# Patient Record
Sex: FEMALE | Race: BLACK OR AFRICAN AMERICAN | NOT HISPANIC OR LATINO | Employment: UNEMPLOYED | URBAN - METROPOLITAN AREA
[De-identification: names, ages, dates, MRNs, and addresses within clinical notes are randomized per-mention and may not be internally consistent; named-entity substitution may affect disease eponyms.]

---

## 2018-01-13 NOTE — RESULT NOTES
Message  attempted to call pt again  -number incorrect" STD letter mailed to pt's home & scanned into chart   health bureau was also notified of + chlamydia results      Signatures   Electronically signed by : Adolph Ovalle RN; Jun 30 2016  3:02PM EST                       (Author)

## 2018-09-09 ENCOUNTER — APPOINTMENT (EMERGENCY)
Dept: RADIOLOGY | Facility: HOSPITAL | Age: 28
End: 2018-09-09
Payer: COMMERCIAL

## 2018-09-09 ENCOUNTER — HOSPITAL ENCOUNTER (EMERGENCY)
Facility: HOSPITAL | Age: 28
Discharge: HOME | End: 2018-09-09
Attending: EMERGENCY MEDICINE | Admitting: EMERGENCY MEDICINE
Payer: COMMERCIAL

## 2018-09-09 VITALS
SYSTOLIC BLOOD PRESSURE: 115 MMHG | DIASTOLIC BLOOD PRESSURE: 68 MMHG | BODY MASS INDEX: 31.53 KG/M2 | HEART RATE: 100 BPM | WEIGHT: 178 LBS | RESPIRATION RATE: 16 BRPM | OXYGEN SATURATION: 96 % | TEMPERATURE: 97.9 F

## 2018-09-09 DIAGNOSIS — S93.409A ANKLE SPRAIN: Primary | ICD-10-CM

## 2018-09-09 PROCEDURE — 73630 X-RAY EXAM OF FOOT: CPT

## 2018-09-09 PROCEDURE — 73610 X-RAY EXAM OF ANKLE: CPT

## 2018-09-09 PROCEDURE — 99283 EMERGENCY DEPT VISIT LOW MDM: CPT

## 2018-09-09 RX ORDER — NAPROXEN 500 MG/1
500 TABLET ORAL 2 TIMES DAILY WITH MEALS
Qty: 30 TABLET | Refills: 0 | Status: SHIPPED | OUTPATIENT
Start: 2018-09-09

## 2018-09-09 NOTE — DISCHARGE INSTRUCTIONS
Naproxen 500 mg every 12 hours as needed for mild-to-moderate pain  Acetaminophen 650mg by mouth every 8 hours as needed for mild to moderate pain or fever  You can take naproxen and Acetaminophen together safely  Apply a compressive ACE bandage  Rest and elevate the affected painful area  Apply cold compresses intermittently as needed  As pain recedes, begin normal activities slowly as tolerated  Follow up with primary care doctor in 3-5 days for further evaluation  You can also follow up with an orthopedist for further evaluation  Return to the ED for fevers, chills, redness, warmth, numbness, tingling, weakness or any other concerns  Ankle Sprain   WHAT YOU NEED TO KNOW:   An ankle sprain happens when 1 or more ligaments in your ankle joint stretch or tear  Ligaments are tough tissues that connect bones  Ligaments support your joints and keep your bones in place  DISCHARGE INSTRUCTIONS:   Return to the emergency department if:   · You have severe pain in your ankle  · Your foot or toes are cold or numb  · Your ankle becomes more weak or unstable (wobbly)  · You are unable to put any weight on your ankle or foot  · Your swelling has increased or returned  Contact your healthcare provider if:   · Your pain does not go away, even after treatment  · You have questions or concerns about your condition or care  Medicines: You may need any of the following:  · NSAIDs , such as ibuprofen, help decrease swelling, pain, and fever  This medicine is available with or without a doctor's order  NSAIDs can cause stomach bleeding or kidney problems in certain people  If you take blood thinner medicine, always ask your healthcare provider if NSAIDs are safe for you  Always read the medicine label and follow directions  · Acetaminophen  decreases pain  It is available without a doctor's order  Ask how much to take and how often to take it  Follow directions   Acetaminophen can cause liver damage if not taken correctly  · Prescription pain medicine  may be given  Ask how to take this medicine safely  · Take your medicine as directed  Contact your healthcare provider if you think your medicine is not helping or if you have side effects  Tell him or her if you are allergic to any medicine  Keep a list of the medicines, vitamins, and herbs you take  Include the amounts, and when and why you take them  Bring the list or the pill bottles to follow-up visits  Carry your medicine list with you in case of an emergency  Self care:   · Use support devices,  such as a brace, cast, or splint, may be needed to limit your movement and protect your joint  You may need to use crutches to decrease your pain as you move around  · Go to physical therapy as directed  A physical therapist teaches you exercises to help improve movement and strength, and to decrease pain  · Rest  your ankle so that it can heal  Return to normal activities as directed  · Apply ice on your ankle for 15 to 20 minutes every hour or as directed  Use an ice pack, or put crushed ice in a plastic bag  Cover it with a towel  Ice helps prevent tissue damage and decreases swelling and pain  · Compress  your ankle  Ask if you should wrap an elastic bandage around your injured ligament  An elastic bandage provides support and helps decrease swelling and movement so your joint can heal  Wear as long as directed  · Elevate  your ankle above the level of your heart as often as you can  This will help decrease swelling and pain  Prop your ankle on pillows or blankets to keep it elevated comfortably  Prevent another ankle sprain:   · Let your ankle heal   Find out how long your ligament needs to heal  Do not do any physical activity until your healthcare provider says it is okay  If you start activity too soon, you may develop a more serious injury  · Always warm up and stretch  before you exercise or play sports      · Use the right equipment  Always wear shoes that fit well and are made for the activity that you are doing  You may also need ankle supports, elbow and knee pads, or braces  Follow up with your healthcare provider as directed:  Write down your questions so you remember to ask them during your visits  © 2017 [employer] Information is for End User's use only and may not be sold, redistributed or otherwise used for commercial purposes  All illustrations and images included in CareNotes® are the copyrighted property of [employer]  or [employer]elani [employer]  The above information is an  only  It is not intended as medical advice for individual conditions or treatments  Talk to your doctor, nurse or pharmacist before following any medical regimen to see if it is safe and effective for you

## 2020-01-17 ENCOUNTER — HOSPITAL ENCOUNTER (EMERGENCY)
Facility: HOSPITAL | Age: 30
Discharge: HOME | End: 2020-01-17
Attending: EMERGENCY MEDICINE
Payer: COMMERCIAL

## 2020-01-17 VITALS
TEMPERATURE: 99.2 F | SYSTOLIC BLOOD PRESSURE: 133 MMHG | RESPIRATION RATE: 16 BRPM | OXYGEN SATURATION: 100 % | WEIGHT: 176.37 LBS | BODY MASS INDEX: 31.24 KG/M2 | DIASTOLIC BLOOD PRESSURE: 78 MMHG | HEART RATE: 100 BPM

## 2020-01-17 DIAGNOSIS — R05.9 COUGH: ICD-10-CM

## 2020-01-17 DIAGNOSIS — N39.0 UTI (URINARY TRACT INFECTION): Primary | ICD-10-CM

## 2020-01-17 LAB
BACTERIA UR QL AUTO: ABNORMAL /HPF
BILIRUB UR QL STRIP: NEGATIVE
CLARITY UR: ABNORMAL
COLOR UR: ABNORMAL
EXT PREG TEST URINE: NEGATIVE
EXT. CONTROL ED NAV: NORMAL
GLUCOSE UR STRIP-MCNC: NEGATIVE MG/DL
HGB UR QL STRIP.AUTO: 250
KETONES UR STRIP-MCNC: ABNORMAL MG/DL
LEUKOCYTE ESTERASE UR QL STRIP: 500
NITRITE UR QL STRIP: POSITIVE
NON-SQ EPI CELLS URNS QL MICRO: ABNORMAL /HPF
OTHER STN SPEC: ABNORMAL
PH UR STRIP.AUTO: 6.5 [PH]
PROT UR STRIP-MCNC: ABNORMAL MG/DL
RBC #/AREA URNS AUTO: ABNORMAL /HPF
SP GR UR STRIP.AUTO: 1.01 (ref 1–1.04)
UROBILINOGEN UA: 1 MG/DL
WBC #/AREA URNS AUTO: ABNORMAL /HPF

## 2020-01-17 PROCEDURE — 81001 URINALYSIS AUTO W/SCOPE: CPT | Performed by: EMERGENCY MEDICINE

## 2020-01-17 PROCEDURE — 99284 EMERGENCY DEPT VISIT MOD MDM: CPT | Performed by: EMERGENCY MEDICINE

## 2020-01-17 PROCEDURE — 99284 EMERGENCY DEPT VISIT MOD MDM: CPT

## 2020-01-17 PROCEDURE — 81025 URINE PREGNANCY TEST: CPT | Performed by: EMERGENCY MEDICINE

## 2020-01-17 RX ORDER — NITROFURANTOIN 25; 75 MG/1; MG/1
100 CAPSULE ORAL 2 TIMES DAILY
Qty: 14 CAPSULE | Refills: 0 | Status: SHIPPED | OUTPATIENT
Start: 2020-01-17 | End: 2020-01-24

## 2020-01-17 RX ORDER — BENZONATATE 100 MG/1
100 CAPSULE ORAL EVERY 8 HOURS
Qty: 21 CAPSULE | Refills: 0 | Status: SHIPPED | OUTPATIENT
Start: 2020-01-17

## 2020-01-17 NOTE — ED PROVIDER NOTES
History  Chief Complaint   Patient presents with    Flank Pain     " for four days, I think I have another kidney infection, pain to left side, chills,  fever, change in color of urine, frequency"     58-year-old female presents with complaint of left flank pain  She reports that she has a history of pyelonephritis and had similar symptoms at that point time  She does complain of subjective fevers and chills  Symptoms have all been progressive over the past four days  She denies any dysuria or urinary frequency  She took one dose of Aleve earlier this morning with partial relief of symptoms  She denies vomiting but has had decreased appetite and mild nausea  Flank Pain   Pain location:  L flank  Pain quality: aching and dull    Pain radiates to:  Does not radiate  Pain severity:  Mild  Onset quality:  Gradual  Timing:  Constant  Progression:  Worsening  Chronicity:  Recurrent  Relieved by:  Nothing  Worsened by:  Nothing  Ineffective treatments:  OTC medications (Partial relief only)  Associated symptoms: anorexia, chills, fever and nausea    Associated symptoms: no chest pain, no constipation, no cough, no diarrhea, no dysuria, no fatigue, no flatus, no hematuria, no melena, no shortness of breath, no sore throat, no vaginal bleeding, no vaginal discharge and no vomiting        Prior to Admission Medications   Prescriptions Last Dose Informant Patient Reported? Taking? albuterol (PROVENTIL HFA,VENTOLIN HFA) 90 mcg/act inhaler   Yes No   Sig: Inhale 1 puff    naproxen (NAPROSYN) 500 mg tablet   No No   Sig: Take 1 tablet (500 mg total) by mouth 2 (two) times a day with meals      Facility-Administered Medications: None       Past Medical History:   Diagnosis Date    Asthma     UTI in pregnancy        History reviewed  No pertinent surgical history      Family History   Problem Relation Age of Onset    Hypertension Mother     Hypertension Father     Hypertension Maternal Grandmother      I have reviewed and agree with the history as documented  Social History     Tobacco Use    Smoking status: Current Some Day Smoker     Packs/day: 0 20     Types: Cigarettes    Smokeless tobacco: Never Used   Substance Use Topics    Alcohol use: Yes     Alcohol/week: 1 0 standard drinks     Types: 1 Glasses of wine per week    Drug use: No        Review of Systems   Constitutional: Positive for chills and fever  Negative for appetite change and fatigue  HENT: Negative for postnasal drip, sinus pain, sore throat and trouble swallowing  Eyes: Negative for redness and itching  Respiratory: Negative for cough, chest tightness, shortness of breath and wheezing  Cardiovascular: Negative for chest pain and leg swelling  Gastrointestinal: Positive for anorexia and nausea  Negative for abdominal pain, constipation, diarrhea, flatus, melena and vomiting  Endocrine: Negative  Genitourinary: Positive for flank pain  Negative for difficulty urinating, dysuria, hematuria, vaginal bleeding and vaginal discharge  Musculoskeletal: Negative for back pain and myalgias  Skin: Negative for rash  Allergic/Immunologic: Negative  Neurological: Negative for dizziness, numbness and headaches  Hematological: Negative  Psychiatric/Behavioral: Negative  Physical Exam  Physical Exam   Constitutional: She is oriented to person, place, and time  She appears well-developed and well-nourished  HENT:   Head: Normocephalic and atraumatic  Nose: Nose normal    Mouth/Throat: Oropharynx is clear and moist    Eyes: Pupils are equal, round, and reactive to light  Conjunctivae and EOM are normal    Neck: Normal range of motion  Neck supple  Cardiovascular: Normal rate, regular rhythm and normal heart sounds  Pulmonary/Chest: Effort normal and breath sounds normal  No respiratory distress  She has no wheezes  Abdominal: Soft  Bowel sounds are normal  There is tenderness (left sided/suprapubic)   There is no rebound, no guarding and no CVA tenderness  Musculoskeletal: She exhibits no edema, tenderness or deformity  Neurological: She is alert and oriented to person, place, and time  Skin: Skin is warm and dry  Capillary refill takes less than 2 seconds  No rash noted  Psychiatric: She has a normal mood and affect  Her behavior is normal    Nursing note and vitals reviewed        Vital Signs  ED Triage Vitals [01/17/20 1144]   Temperature Pulse Respirations Blood Pressure SpO2   99 2 °F (37 3 °C) 100 16 133/78 100 %      Temp Source Heart Rate Source Patient Position - Orthostatic VS BP Location FiO2 (%)   Tympanic Monitor Sitting Left arm --      Pain Score       --           Vitals:    01/17/20 1144   BP: 133/78   Pulse: 100   Patient Position - Orthostatic VS: Sitting         Visual Acuity      ED Medications  Medications - No data to display    Diagnostic Studies  Results Reviewed     Procedure Component Value Units Date/Time    Urine Microscopic [[id number]]  (Abnormal) Collected:  01/17/20 1204    Lab Status:  Final result Specimen:  Urine, Other Updated:  01/17/20 1248     RBC, UA Innumerable /hpf      WBC, UA Innumerable /hpf      Epithelial Cells Moderate /hpf      Bacteria, UA Occasional /hpf      OTHER OBSERVATIONS Trichomonas Organisms Present    UA (URINE) with reflex to Scope [[id number]]  (Abnormal) Collected:  01/17/20 1204    Lab Status:  Final result Specimen:  Urine, Other Updated:  01/17/20 1214     Color, UA Rosangela     Clarity, UA Slightly Cloudy     Specific Gravity, UA 1 015     pH, UA 6 5     Leukocytes,  0     Nitrite, UA Positive     Protein,  (2+) mg/dl      Glucose, UA Negative mg/dl      Ketones, UA 5 (Trace) mg/dl      Bilirubin, UA Negative     Blood,  0     UROBILINOGEN UA 1 0 mg/dL     POCT pregnancy, urine [[id number]]  (Normal) Resulted:  01/17/20 1206    Lab Status:  Final result Updated:  01/17/20 1206     EXT PREG TEST UR (Ref: Negative) negative     Control valid No orders to display              Procedures  Procedures         ED Course                               MDM      Disposition  Final diagnoses:   UTI (urinary tract infection)   Cough     Time reflects when diagnosis was documented in both MDM as applicable and the Disposition within this note     Time User Action Codes Description Comment    1/17/2020 12:47 PM Raynold Favorite Add [N39 0] UTI (urinary tract infection)     1/17/2020 12:48 PM Raynold Favorite Add [R05] Cough       ED Disposition     ED Disposition Condition Date/Time Comment    Discharge Stable Fri Jan 17, 2020 12:47 PM Vona Leyden discharge to home/self care  Follow-up Information     Follow up With Specialties Details Why 9 [facility] Emergency Medicine  If symptoms worsen [address] [address]  [address] [facility], [place] Internal Medicine Call   [facility] 14  [address]  [phone]            Discharge Medication List as of 1/17/2020 12:48 PM      START taking these medications    Details   benzonatate (TESSALON PERLES) 100 mg capsule Take 1 capsule (100 mg total) by mouth every 8 (eight) hours, Starting Fri 1/17/2020, Print      nitrofurantoin (MACROBID) 100 mg capsule Take 1 capsule (100 mg total) by mouth 2 (two) times a day for 7 days, Starting Fri 1/17/2020, Until Fri 1/24/2020, Print         CONTINUE these medications which have NOT CHANGED    Details   albuterol (PROVENTIL HFA,VENTOLIN HFA) 90 mcg/act inhaler Inhale 1 puff , Until Discontinued, Historical Med      naproxen (NAPROSYN) 500 mg tablet Take 1 tablet (500 mg total) by mouth 2 (two) times a day with meals, Starting Sun 9/9/2018, Print           No discharge procedures on file      ED Provider  Electronically Signed by           Stacy Juarez DO  01/17/20 3423

## 2021-08-18 ENCOUNTER — HOSPITAL ENCOUNTER (EMERGENCY)
Facility: HOSPITAL | Age: 31
Discharge: HOME | End: 2021-08-18
Attending: EMERGENCY MEDICINE | Admitting: EMERGENCY MEDICINE
Payer: COMMERCIAL

## 2021-08-18 VITALS
SYSTOLIC BLOOD PRESSURE: 137 MMHG | DIASTOLIC BLOOD PRESSURE: 75 MMHG | BODY MASS INDEX: 33.2 KG/M2 | RESPIRATION RATE: 18 BRPM | WEIGHT: 187.39 LBS | OXYGEN SATURATION: 96 % | HEIGHT: 63 IN | TEMPERATURE: 101.3 F | HEART RATE: 96 BPM

## 2021-08-18 DIAGNOSIS — R68.89 FLU-LIKE SYMPTOMS: Primary | ICD-10-CM

## 2021-08-18 LAB — SARS-COV-2 RNA RESP QL NAA+PROBE: POSITIVE

## 2021-08-18 PROCEDURE — 99284 EMERGENCY DEPT VISIT MOD MDM: CPT | Performed by: EMERGENCY MEDICINE

## 2021-08-18 PROCEDURE — U0003 INFECTIOUS AGENT DETECTION BY NUCLEIC ACID (DNA OR RNA); SEVERE ACUTE RESPIRATORY SYNDROME CORONAVIRUS 2 (SARS-COV-2) (CORONAVIRUS DISEASE [COVID-19]), AMPLIFIED PROBE TECHNIQUE, MAKING USE OF HIGH THROUGHPUT TECHNOLOGIES AS DESCRIBED BY CMS-2020-01-R: HCPCS | Performed by: EMERGENCY MEDICINE

## 2021-08-18 PROCEDURE — U0005 INFEC AGEN DETEC AMPLI PROBE: HCPCS | Performed by: EMERGENCY MEDICINE

## 2021-08-18 PROCEDURE — 99283 EMERGENCY DEPT VISIT LOW MDM: CPT

## 2021-08-18 RX ORDER — ALBUTEROL SULFATE 2.5 MG/3ML
2.5 SOLUTION RESPIRATORY (INHALATION) EVERY 6 HOURS PRN
Qty: 75 ML | Refills: 0 | Status: SHIPPED | OUTPATIENT
Start: 2021-08-18

## 2021-08-18 RX ORDER — ALBUTEROL SULFATE 90 UG/1
2 AEROSOL, METERED RESPIRATORY (INHALATION) ONCE
Status: COMPLETED | OUTPATIENT
Start: 2021-08-18 | End: 2021-08-18

## 2021-08-18 RX ORDER — ACETAMINOPHEN 325 MG/1
975 TABLET ORAL ONCE
Status: COMPLETED | OUTPATIENT
Start: 2021-08-18 | End: 2021-08-18

## 2021-08-18 RX ORDER — ALBUTEROL SULFATE 90 UG/1
2 AEROSOL, METERED RESPIRATORY (INHALATION) EVERY 4 HOURS PRN
Qty: 8 G | Refills: 0 | Status: SHIPPED | OUTPATIENT
Start: 2021-08-18

## 2021-08-18 RX ORDER — PREDNISONE 20 MG/1
40 TABLET ORAL ONCE
Status: DISCONTINUED | OUTPATIENT
Start: 2021-08-18 | End: 2021-08-18

## 2021-08-18 RX ADMIN — ACETAMINOPHEN 975 MG: 325 TABLET, FILM COATED ORAL at 20:28

## 2021-08-18 RX ADMIN — DEXAMETHASONE SODIUM PHOSPHATE 10 MG: 10 INJECTION, SOLUTION INTRAMUSCULAR; INTRAVENOUS at 20:28

## 2021-08-18 RX ADMIN — ALBUTEROL SULFATE 2 PUFF: 90 AEROSOL, METERED RESPIRATORY (INHALATION) at 20:28

## 2021-08-18 NOTE — Clinical Note
Nasim Lim was seen and treated in our emergency department on 8/18/2021  Diagnosis:     [place]  may return to work on return date  She may return on this date: 08/20/2021         If you have any questions or concerns, please don't hesitate to call        Margarita Barber MD    ______________________________           _______________          _______________  Hospital Representative                              Date                                Time

## 2021-08-18 NOTE — Clinical Note
Shani Torres was seen and treated in our emergency department on 8/18/2021  Diagnosis:     [place]  may return to work on return date  She may return on this date: 08/30/2021         If you have any questions or concerns, please don't hesitate to call        Zoya Alatorre MD    ______________________________           _______________          _______________  Hospital Representative                              Date                                Time

## 2021-08-18 NOTE — ED PROVIDER NOTES
History  Chief Complaint   Patient presents with    Flu Symptoms     PT was exposed to covid on saturday and is experiencing covid like symptoms, running fevers, chills, body aches  History provided by:  Patient   used: No    Flu Symptoms  Presenting symptoms: fatigue, fever, myalgias, nausea and sore throat    Presenting symptoms: no cough, no shortness of breath and no vomiting    Severity:  Moderate  Onset quality:  Gradual  Duration:  2 days  Progression:  Worsening  Chronicity:  New  Relieved by:  Nothing  Worsened by:  Nothing  Ineffective treatments:  None tried  Associated symptoms: no chills and no ear pain        Prior to Admission Medications   Prescriptions Last Dose Informant Patient Reported? Taking? albuterol (PROVENTIL HFA,VENTOLIN HFA) 90 mcg/act inhaler   Yes No   Sig: Inhale 1 puff    benzonatate (TESSALON PERLES) 100 mg capsule   No No   Sig: Take 1 capsule (100 mg total) by mouth every 8 (eight) hours   naproxen (NAPROSYN) 500 mg tablet   No No   Sig: Take 1 tablet (500 mg total) by mouth 2 (two) times a day with meals      Facility-Administered Medications: None       Past Medical History:   Diagnosis Date    Asthma     UTI in pregnancy        History reviewed  No pertinent surgical history  Family History   Problem Relation Age of Onset    Hypertension Mother     Hypertension Father     Hypertension Maternal Grandmother      I have reviewed and agree with the history as documented  E-Cigarette/Vaping     E-Cigarette/Vaping Substances     Social History     Tobacco Use    Smoking status: Current Some Day Smoker     Packs/day: 0 20     Types: Cigarettes    Smokeless tobacco: Never Used   Substance Use Topics    Alcohol use: Yes     Alcohol/week: 1 0 standard drinks     Types: 1 Glasses of wine per week    Drug use: No       Review of Systems   Constitutional: Positive for fatigue and fever  Negative for chills  HENT: Positive for sore throat  Negative for ear pain  Eyes: Negative for pain and visual disturbance  Respiratory: Negative for cough and shortness of breath  Cardiovascular: Negative for chest pain and palpitations  Gastrointestinal: Positive for nausea  Negative for abdominal pain and vomiting  Genitourinary: Negative for dysuria and hematuria  Musculoskeletal: Positive for myalgias  Negative for arthralgias and back pain  Skin: Negative for color change and rash  Neurological: Negative for seizures and syncope  All other systems reviewed and are negative  Physical Exam  Physical Exam  Vitals and nursing note reviewed  Constitutional:       General: She is not in acute distress  Appearance: She is well-developed  HENT:      Head: Normocephalic and atraumatic  Right Ear: Tympanic membrane normal       Left Ear: Tympanic membrane normal       Nose: No congestion or rhinorrhea  Mouth/Throat:      Mouth: Mucous membranes are moist       Pharynx: Oropharynx is clear  Posterior oropharyngeal erythema present  No oropharyngeal exudate  Eyes:      Conjunctiva/sclera: Conjunctivae normal    Cardiovascular:      Rate and Rhythm: Normal rate and regular rhythm  Heart sounds: No murmur heard  Pulmonary:      Effort: Pulmonary effort is normal  No respiratory distress  Breath sounds: Normal breath sounds  Abdominal:      Palpations: Abdomen is soft  Tenderness: There is no abdominal tenderness  Musculoskeletal:      Cervical back: Normal range of motion and neck supple  No rigidity or tenderness  Skin:     General: Skin is warm and dry  Capillary Refill: Capillary refill takes less than 2 seconds  Neurological:      General: No focal deficit present  Mental Status: She is alert and oriented to person, place, and time           Vital Signs  ED Triage Vitals   Temperature Pulse Respirations Blood Pressure SpO2   08/18/21 1908 08/18/21 1908 08/18/21 1908 08/18/21 1908 08/18/21 1908   (!) 101 3 °F (38 5 °C) 96 18 137/75 96 %      Temp Source Heart Rate Source Patient Position - Orthostatic VS BP Location FiO2 (%)   08/18/21 1908 08/18/21 1908 08/18/21 1908 08/18/21 1908 --   Oral Monitor Sitting Left arm       Pain Score       08/18/21 2028       5           Vitals:    08/18/21 1908   BP: 137/75   Pulse: 96   Patient Position - Orthostatic VS: Sitting         Visual Acuity      ED Medications  Medications   acetaminophen (TYLENOL) tablet 975 mg (975 mg Oral Given 8/18/21 2028)   dexamethasone oral liquid 10 mg 1 mL (10 mg Oral Given 8/18/21 2028)   albuterol (PROVENTIL HFA,VENTOLIN HFA) inhaler 2 puff (2 puffs Inhalation Given 8/18/21 2028)       Diagnostic Studies  Results Reviewed     Procedure Component Value Units Date/Time    Novel Coronavirus (Covid-19),PCR [facility] - 2 Hour Stat [[id number]]  (Abnormal) Collected: 08/18/21 1954    Lab Status: Final result Specimen: Nares from Nasopharyngeal Swab Updated: 08/18/21 2109     SARS-CoV-2 Positive    Narrative: The specimen collection materials, transport medium, and/or testing methodology utilized in the production of these test results have been proven to be reliable in a limited validation with an abbreviated program under the Emergency Utilization Authorization provided by the FDA  Testing reported as "Presumptive positive" will be confirmed with secondary testing to ensure result accuracy  Clinical caution and judgement should be used with the interpretation of these results with consideration of the clinical impression and other laboratory testing  Testing reported as "Positive" or "Negative" has been proven to be accurate according to standard laboratory validation requirements  All testing is performed with control materials showing appropriate reactivity at standard intervals                     No orders to display              Procedures  Procedures         ED Course                                           MDM  Number of Diagnoses or Management Options  Flu-like symptoms: new and requires workup  Diagnosis management comments:  79-year-old female presents for evaluation of two days of generalized fatigue/malaise associated with intermittent subjective fever, generalized body aches, and sore throat  She reports and exposure to COVID-19 about two days prior to the onset of her symptoms  He has currently been having symptoms for about two days  She does have a history of asthma and reports on mild cough with her symptoms but denies significant shortness of breath or dyspnea with exertion  She has been able to tolerate solids and liquids by mouth but does report decreased appetite and nausea without vomiting  Has had some sore throat and discomfort with swallowing but denies significant neck pain/stiffness  No rashes  Patient was treated symptomatically in the emergency department  Given history of asthma will is single dose of Decadron both for symptomatic treatment of sore throat and cough/wheezing  Albuterol inhaler  I recommend close PCP follow-up  Patient is feeling well with reassuring vital signs and physical exam at time of discharge  She was discharged prior to final COVID-19 resolved  I did follow up on the result and later called the patient and informed her of her positive test   We discussed quarantine/isolation requirements  Also discussed strict return precautions for worsening symptoms         Amount and/or Complexity of Data Reviewed  Clinical lab tests: reviewed and ordered        Disposition  Final diagnoses:   Flu-like symptoms     Time reflects when diagnosis was documented in both MDM as applicable and the Disposition within this note     Time User Action Codes Description Comment    8/18/2021  8:38 PM Kimmy June Add [R68 89] Flu-like symptoms       ED Disposition     ED Disposition Condition Date/Time Comment    Discharge Stable Wed Aug 18, 2021  8:05 PM [facility] discharge to home/self care             Follow-up Information     Follow up With Specialties Details Why Karla Morrow MD Internal Medicine   [address]   [address] [facility],[address], [address] [facility], Nurse Practitioner   21 [phone] [address] [facility] 16  [phone]            Discharge Medication List as of 8/18/2021  8:44 PM      START taking these medications    Details   albuterol (2 5 mg/3 mL) 0 083 % nebulizer solution Take 3 mL (2 5 mg total) by nebulization every 6 (six) hours as needed for wheezing or shortness of breath, Starting Wed 8/18/2021, Print      Nebulizers (Compressor Nebulizer) MISC Use 1 Units every 6 (six) hours as needed (wheezing) Diagnosis asthma  May substitute brand available , Starting Wed 8/18/2021, Until Fri 9/17/2021 at 2359, Print         CONTINUE these medications which have NOT CHANGED    Details   albuterol (PROVENTIL HFA,VENTOLIN HFA) 90 mcg/act inhaler Inhale 1 puff , Until Discontinued, Historical Med      benzonatate (TESSALON PERLES) 100 mg capsule Take 1 capsule (100 mg total) by mouth every 8 (eight) hours, Starting Fri 1/17/2020, Print      naproxen (NAPROSYN) 500 mg tablet Take 1 tablet (500 mg total) by mouth 2 (two) times a day with meals, Starting Sun 9/9/2018, Print           No discharge procedures on file      PDMP Review     None          ED Provider  Electronically Signed by           Kera Nicole MD  09/07/21 1018

## 2023-01-17 ENCOUNTER — OFFICE VISIT (OUTPATIENT)
Dept: OBGYN CLINIC | Facility: CLINIC | Age: 33
End: 2023-01-17

## 2023-01-17 VITALS
WEIGHT: 196.2 LBS | SYSTOLIC BLOOD PRESSURE: 134 MMHG | HEIGHT: 62 IN | BODY MASS INDEX: 36.1 KG/M2 | DIASTOLIC BLOOD PRESSURE: 87 MMHG | HEART RATE: 73 BPM

## 2023-01-17 DIAGNOSIS — Z30.09 ENCOUNTER FOR COUNSELING REGARDING CONTRACEPTION: Primary | ICD-10-CM

## 2023-01-17 NOTE — PROGRESS NOTES
Assessment/Plan:    No problem-specific Assessment & Plan notes found for this encounter  RTO for office for annual, Pap,  check IUD  After that will return to office for IUD removal  Plans OCPs after IUD removal     Diagnoses and all orders for this visit:    Encounter for counseling regarding contraception          Subjective:      Patient ID: Karl Jamil is a 34 y o  female  HPI 34year-old G3, P3 new patient here for consultation for IUD removal   Mirena IUD inserted 6/16/2016  She reports has not seen gynecologist since her last delivery about 6 years ago  Reviewed with patient that IUD is effective for 8 years now but patient desires removal   She reports she has irregular bleeding that she does not like at this time and would like removal   Also reports she gets cramps without any bleeding  She would like to use OCPs once it is removed  Would like to have a regular menses at this time  Patient is unsure when her last Pap was , no results in chart  Recommend for her to return to office for her annual and Pap  Does use tobacco but is trying to quit, reviewed cannot use combination OCPs at 34 if smoking  The following portions of the patient's history were reviewed and updated as appropriate: allergies, current medications, past family history, past medical history, past social history, past surgical history and problem list     Review of Systems   Constitutional: Negative for chills and fever  Respiratory: Negative  Cardiovascular: Negative  Genitourinary: Positive for menstrual problem  Negative for pelvic pain  Objective:      /87 (BP Location: Left arm, Patient Position: Sitting, Cuff Size: Adult)   Pulse 73   Ht 5' 2" (1 575 m)   Wt 89 kg (196 lb 3 2 oz)   BMI 35 89 kg/m²          Physical Exam  Constitutional:       Appearance: Normal appearance  Cardiovascular:      Rate and Rhythm: Normal rate     Pulmonary:      Effort: Pulmonary effort is normal  Neurological:      Mental Status: She is oriented to person, place, and time     Psychiatric:         Mood and Affect: Mood normal          Behavior: Behavior normal

## 2023-01-24 ENCOUNTER — ANNUAL EXAM (OUTPATIENT)
Dept: OBGYN CLINIC | Facility: CLINIC | Age: 33
End: 2023-01-24

## 2023-01-24 VITALS
WEIGHT: 198.4 LBS | RESPIRATION RATE: 16 BRPM | HEART RATE: 70 BPM | SYSTOLIC BLOOD PRESSURE: 127 MMHG | BODY MASS INDEX: 36.29 KG/M2 | DIASTOLIC BLOOD PRESSURE: 76 MMHG

## 2023-01-24 DIAGNOSIS — Z97.5 IUD (INTRAUTERINE DEVICE) IN PLACE: ICD-10-CM

## 2023-01-24 DIAGNOSIS — Z72.51 HIGH RISK HETEROSEXUAL BEHAVIOR: ICD-10-CM

## 2023-01-24 DIAGNOSIS — Z01.419 ENCOUNTER FOR WELL WOMAN EXAM WITH ROUTINE GYNECOLOGICAL EXAM: Primary | ICD-10-CM

## 2023-01-24 NOTE — PROGRESS NOTES
ASSESSMENT & PLAN: Jil Lancaster is a 34 y o  [id number] with normal gynecologic exam     1   Routine well woman exam done today  2  Pap and HPV:  The patient's last pap and hpv was unsure- maybe 7 years ago    It was normal     Pap and cotesting was done today  Current ASCCP Guidelines reviewed  3   The following were reviewed in today's visit: breast self exam, STD testing, family planning choices, adequate intake of calcium and vitamin D, exercise, healthy diet and tobacco cessation  4  mirena IUD placed 6/16/2016- effective until 6/2024- RTO for removal pt desires, would like to use OCP's   5  Had Gardisil vaccines- she thinks she had - will check    BMI Counseling: Body mass index is 36 29 kg/m²  The BMI is above normal  Nutrition recommendations include 3-5 servings of fruits/vegetables daily, moderation in carbohydrate intake and reducing intake of cholesterol  Exercise recommendations include exercising 3-5 times per week  Depression Screening Follow-up Plan: Patient's depression screening was  Negative with a PHQ-2 score of 0   Their PHQ-9 score was 4   Clinically patient does not have depression  No treatment is required  Tobacco Cessation Counseling: Tobacco cessation counseling was not provided  The patient is sincerely urged to quit consumption of tobacco  She is ready to quit tobacco  The numerous health risks of tobacco consumption were discussed  CC:  Annual Gynecologic Examination    HPI: Jil Lancaster is a 34 y o  [place] who presents for annual gynecologic examination  She has a Mirena IUD that was placed 6/16/2016, she gets irregular bleeding with use  Desires removal due to cramping and irregular bleeding  Desires to use OCP's  She is also trying to quit smoking  She has a new partner x 1 year, consents to testing for GC and cT  Health Maintenance:    She wears her seatbelt routinely  She does perform regular monthly self breast exams  She feels safe at home  Past Medical History:   Diagnosis Date   • 38 weeks gestation of pregnancy 2016   • Asthma    •  (spontaneous vaginal delivery) 2016   • UTI in pregnancy        History reviewed  No pertinent surgical history  Past OB/Gyn History:  OB History        3    Para   3    Term   3            AB        Living   3       SAB        IAB        Ectopic        Multiple   0    Live Births   3               Pt has menstrual issues  Irregular with IUD   History of sexually transmitted infection: No   History of abnormal pap smears: No      Patient is currently sexually active  heterosexual   The current method of family planning is IUD  Family History   Problem Relation Age of Onset   • Hypertension Mother    • No Known Problems Sister    • No Known Problems Sister    • No Known Problems Sister    • Spina bifida Brother    • No Known Problems Brother    • No Known Problems Daughter    • No Known Problems Daughter    • No Known Problems Daughter    • Hypertension Maternal Grandmother    • Deep vein thrombosis Paternal Grandmother    • Breast cancer Neg Hx    • Colon cancer Neg Hx    • Ovarian cancer Neg Hx        Social History:  Social History     Socioeconomic History   • Marital status: /Civil Union     Spouse name: Not on file   • Number of children: Not on file   • Years of education: Not on file   • Highest education level: Not on file   Occupational History   • Not on file   Tobacco Use   • Smoking status: Some Days     Packs/day: 0 20     Types: Cigarettes   • Smokeless tobacco: Never   Vaping Use   • Vaping Use: Never used   Substance and Sexual Activity   • Alcohol use: Yes     Alcohol/week: 1 0 standard drink     Types: 1 Glasses of wine per week   • Drug use: No   • Sexual activity: Yes     Birth control/protection: I U D     Other Topics Concern   • Not on file   Social History Narrative   • Not on file     Social Determinants of Health     Financial Resource Strain: Low Risk • Difficulty of Paying Living Expenses: Not very hard   Food Insecurity: No Food Insecurity   • Worried About Running Out of Food in the Last Year: Never true   • Ran Out of Food in the Last Year: Never true   Transportation Needs: No Transportation Needs   • Lack of Transportation (Medical): No   • Lack of Transportation (Non-Medical): No   Physical Activity: Not on file   Stress: Not on file   Social Connections: Not on file   Intimate Partner Violence: Unknown   • Fear of Current or Ex-Partner: No   • Emotionally Abused: No   • Physically Abused: No   • Sexually Abused: Not on file   Housing Stability: Low Risk    • Unable to Pay for Housing in the Last Year: No   • Number of Places Lived in the Last Year: 1   • Unstable Housing in the Last Year: No     Presently lives with family  Patient is   Patient is currently employed she works from home  No Known Allergies      Current Outpatient Medications:   •  albuterol (2 5 mg/3 mL) 0 083 % nebulizer solution, Take 3 mL (2 5 mg total) by nebulization every 6 (six) hours as needed for wheezing or shortness of breath, Disp: 75 mL, Rfl: 0  •  albuterol (ProAir HFA) 90 mcg/act inhaler, Inhale 2 puffs every 4 (four) hours as needed for wheezing, Disp: 8 g, Rfl: 0  •  albuterol (PROVENTIL HFA,VENTOLIN HFA) 90 mcg/act inhaler, Inhale 1 puff , Disp: , Rfl:   •  benzonatate (TESSALON PERLES) 100 mg capsule, Take 1 capsule (100 mg total) by mouth every 8 (eight) hours (Patient not taking: Reported on 1/24/2023), Disp: 21 capsule, Rfl: 0  •  naproxen (NAPROSYN) 500 mg tablet, Take 1 tablet (500 mg total) by mouth 2 (two) times a day with meals (Patient not taking: Reported on 1/24/2023), Disp: 30 tablet, Rfl: 0  •  Nebulizers (Compressor Nebulizer) MISC, Use 1 Units every 6 (six) hours as needed (wheezing) Diagnosis asthma   May substitute brand available , Disp: 1 each, Rfl: 0      Review of Systems  Constitutional :no fever, feels well, no tiredness, no recent weight gain or loss  ENT: no ear ache, no loss of hearing, no nosebleeds or nasal discharge, no sore throat or hoarseness  Cardiovascular: no complaints of slow or fast heart beat, no chest pain, no palpitations, no leg claudication or lower extremity edema  Respiratory: no complaints of shortness of shortness of breath, no GRAYSON  Breasts:no complaints of breast pain, breast lump, or nipple discharge  Gastrointestinal: no complaints of abdominal pain, constipation, nausea, vomiting, or diarrhea or bloody stools  Genitourinary : no complaints of dysuria, incontinence, pelvic pain, no dysmenorrhea, vaginal discharge or abnormal vaginal bleeding and as noted in HPI  Musculoskeletal: no complaints of arthralgia, no myalgia, no joint swelling or stiffness, no limb pain or swelling  Integumentary: no complaints of skin rash or lesion, itching or dry skin  Neurological: no complaints of headache, no confusion, no numbness or tingling, no dizziness or fainting    Objective      /76 (BP Location: Right arm, Patient Position: Sitting, Cuff Size: Large)   Pulse 70   Resp 16   Wt 90 kg (198 lb 6 4 oz)   BMI 36 29 kg/m²   General:   appears stated age, cooperative, alert normal mood and affect   Neck: normal, supple,trachea midline, no masses   Heart: regular rate and rhythm, S1, S2 normal, no murmur, click, rub or gallop   Lungs: clear to auscultation bilaterally   Breasts: normal appearance, no masses or tenderness, Inspection negative, No nipple retraction or dimpling, No nipple discharge or bleeding, No axillary or supraclavicular adenopathy, Normal to palpation without dominant masses, Taught monthly breast self examination   Abdomen: soft, non-tender, without masses or organomegaly   Vulva: normal female genitalia, Bartholin's, Urethra, [place] normal   Vagina: normal vagina, no discharge, exudate, lesion, or erythema   Urethra: normal   Cervix: Normal, no discharge  PAP done  GCC done  Nontender  IUD strings present  Uterus: normal size, contour, position, consistency, mobility, non-tender and anteverted   Adnexa: normal adnexa and no mass, fullness, tenderness   Lymphatic palpation of lymph nodes in neck, axilla, groin and/or other locations: no lymphadenopathy or masses noted   Skin normal skin turgor and no rashes     Psychiatric orientation to person, place, and time: normal  mood and affect: normal

## 2023-01-25 LAB
C TRACH DNA SPEC QL NAA+PROBE: NEGATIVE
HPV HR 12 DNA CVX QL NAA+PROBE: POSITIVE
HPV16 DNA CVX QL NAA+PROBE: NEGATIVE
HPV18 DNA CVX QL NAA+PROBE: NEGATIVE
N GONORRHOEA DNA SPEC QL NAA+PROBE: NEGATIVE

## 2023-01-31 DIAGNOSIS — B96.89 BACTERIAL VAGINOSIS: Primary | ICD-10-CM

## 2023-01-31 DIAGNOSIS — A59.9 TRICHOMONAS INFECTION: ICD-10-CM

## 2023-01-31 DIAGNOSIS — N76.0 BACTERIAL VAGINOSIS: Primary | ICD-10-CM

## 2023-01-31 LAB
LAB AP GYN PRIMARY INTERPRETATION: ABNORMAL
Lab: ABNORMAL
PATH INTERP SPEC-IMP: ABNORMAL

## 2023-01-31 RX ORDER — METRONIDAZOLE 500 MG/1
500 TABLET ORAL EVERY 12 HOURS SCHEDULED
Qty: 14 TABLET | Refills: 0 | Status: SHIPPED | OUTPATIENT
Start: 2023-01-31 | End: 2023-02-07

## 2023-02-02 ENCOUNTER — PROCEDURE VISIT (OUTPATIENT)
Dept: OBGYN CLINIC | Facility: CLINIC | Age: 33
End: 2023-02-02

## 2023-02-02 VITALS
HEIGHT: 62 IN | SYSTOLIC BLOOD PRESSURE: 125 MMHG | WEIGHT: 197 LBS | DIASTOLIC BLOOD PRESSURE: 85 MMHG | RESPIRATION RATE: 18 BRPM | HEART RATE: 83 BPM | BODY MASS INDEX: 36.25 KG/M2

## 2023-02-02 DIAGNOSIS — N76.0 BACTERIAL VAGINOSIS: ICD-10-CM

## 2023-02-02 DIAGNOSIS — Z30.432 ENCOUNTER FOR IUD REMOVAL: ICD-10-CM

## 2023-02-02 DIAGNOSIS — B96.89 BACTERIAL VAGINOSIS: ICD-10-CM

## 2023-02-02 DIAGNOSIS — Z30.011 ENCOUNTER FOR INITIAL PRESCRIPTION OF CONTRACEPTIVE PILLS: ICD-10-CM

## 2023-02-02 DIAGNOSIS — A59.9 TRICHOMONAS VAGINALIS INFECTION: Primary | ICD-10-CM

## 2023-02-02 LAB
BV WHIFF TEST VAG QL: ABNORMAL
CLUE CELLS SPEC QL WET PREP: ABNORMAL
PH SMN: 5 [PH]
SL AMB POCT WET MOUNT: ABNORMAL
T VAGINALIS VAG QL WET PREP: ABNORMAL
YEAST VAG QL WET PREP: ABNORMAL

## 2023-02-02 RX ORDER — NORGESTIMATE AND ETHINYL ESTRADIOL 0.25-0.035
1 KIT ORAL DAILY
Qty: 28 TABLET | Refills: 12 | Status: SHIPPED | OUTPATIENT
Start: 2023-02-02 | End: 2023-03-02

## 2023-02-02 NOTE — PROGRESS NOTES
Assessment/Plan:    No problem-specific Assessment & Plan notes found for this encounter  Diagnoses and all orders for this visit:    Encounter for initial prescription of contraceptive pills  -     norgestimate-ethinyl estradiol (Sprintec 28) 0 25-35 MG-MCG per tablet; Take 1 tablet by mouth daily for 28 days  No contraindications to OCP's  Reviewed risks, side effects, benefits, efficacy, back up method, ACHES, and  missed pills  Trichomonas vaginalis infection      Pt has prescription, needs to start  Partner to be treated    Subjective:      Patient ID: Kristen Granados is a 34 y o  female  HPI    The following portions of the patient's history were reviewed and updated as appropriate: allergies, current medications, past family history, past medical history, past social history, past surgical history and problem list     Review of Systems      Objective:      /85 (BP Location: Right arm, Patient Position: Sitting, Cuff Size: Adult)   Pulse 83   Resp 18   Ht 5' 2" (1 575 m)   Wt 89 4 kg (197 lb)   LMP 01/30/2023 (Exact Date)   BMI 36 03 kg/m²          Physical Exam  Constitutional:       Appearance: Normal appearance  Cardiovascular:      Rate and Rhythm: Normal rate  Pulmonary:      Effort: Pulmonary effort is normal    Abdominal:      Palpations: Abdomen is soft  Tenderness: There is no abdominal tenderness  Skin:     General: Skin is warm and dry  Neurological:      Mental Status: She is oriented to person, place, and time  Psychiatric:         Mood and Affect: Mood normal          Behavior: Behavior normal        External genitalia-no lesions or erythema  Vagina-white discharge  Cervix-negative CMT no lesions, IUD strings present    Uterus-anteverted, nontender  Adnexa-no masses nontender    Wet mount KOH-pos trich and BV

## 2023-02-02 NOTE — PROGRESS NOTES
Iud removal    Date/Time: 2/2/2023 6:29 PM  Performed by: MICHELLE Meza  Authorized by: MICHELLE Meza   Universal Protocol:  Procedure performed by:  Consent: Verbal consent obtained  Written consent obtained  Risks and benefits: risks, benefits and alternatives were discussed (risk of pain, bleeding, difficult removal)  Consent given by: patient  Patient understanding: patient states understanding of the procedure being performed  Patient consent: the patient's understanding of the procedure matches consent given  Procedure consent: procedure consent matches procedure scheduled  Patient identity confirmed: verbally with patient      Procedure:     Removed with no complications: no      Removal due to mechanical complications of IUD: no      Removal due to infection and inflammatory reaction: no      Other reason for removal:  Pt desires removal  Comments:      Some difficulty with IUD removal, IUD Strings broke with removal before devise removed

## 2023-02-15 ENCOUNTER — HOSPITAL ENCOUNTER (EMERGENCY)
Facility: HOSPITAL | Age: 33
Discharge: HOME | End: 2023-02-15
Attending: EMERGENCY MEDICINE | Admitting: EMERGENCY MEDICINE

## 2023-02-15 ENCOUNTER — APPOINTMENT (EMERGENCY)
Dept: RADIOLOGY | Facility: HOSPITAL | Age: 33
End: 2023-02-15

## 2023-02-15 VITALS
SYSTOLIC BLOOD PRESSURE: 132 MMHG | HEART RATE: 83 BPM | OXYGEN SATURATION: 97 % | TEMPERATURE: 98.7 F | DIASTOLIC BLOOD PRESSURE: 84 MMHG | RESPIRATION RATE: 20 BRPM

## 2023-02-15 DIAGNOSIS — J45.21 MILD INTERMITTENT ASTHMA WITH ACUTE EXACERBATION: Primary | ICD-10-CM

## 2023-02-15 LAB
ATRIAL RATE: 78 BPM
EXT PREGNANCY TEST URINE: NEGATIVE
EXT. CONTROL: NORMAL
P AXIS: 60 DEGREES
PR INTERVAL: 148 MS
QRS AXIS: 65 DEGREES
QRSD INTERVAL: 76 MS
QT INTERVAL: 378 MS
QTC INTERVAL: 430 MS
T WAVE AXIS: 25 DEGREES
VENTRICULAR RATE: 78 BPM

## 2023-02-15 RX ORDER — PREDNISONE 20 MG/1
40 TABLET ORAL DAILY
Qty: 10 TABLET | Refills: 0 | Status: SHIPPED | OUTPATIENT
Start: 2023-02-15 | End: 2023-02-20

## 2023-02-15 RX ORDER — ALBUTEROL SULFATE 2.5 MG/3ML
2.5 SOLUTION RESPIRATORY (INHALATION) EVERY 6 HOURS PRN
Qty: 75 ML | Refills: 0 | Status: SHIPPED | OUTPATIENT
Start: 2023-02-15 | End: 2023-03-17

## 2023-02-15 RX ORDER — ALBUTEROL SULFATE 90 UG/1
2 AEROSOL, METERED RESPIRATORY (INHALATION) EVERY 4 HOURS PRN
Qty: 6.7 G | Refills: 0 | Status: SHIPPED | OUTPATIENT
Start: 2023-02-15

## 2023-02-15 RX ORDER — ALBUTEROL SULFATE 2.5 MG/3ML
5 SOLUTION RESPIRATORY (INHALATION) ONCE
Status: COMPLETED | OUTPATIENT
Start: 2023-02-15 | End: 2023-02-15

## 2023-02-15 RX ORDER — PREDNISONE 20 MG/1
40 TABLET ORAL ONCE
Status: COMPLETED | OUTPATIENT
Start: 2023-02-15 | End: 2023-02-15

## 2023-02-15 RX ADMIN — ALBUTEROL SULFATE 5 MG: 2.5 SOLUTION RESPIRATORY (INHALATION) at 09:32

## 2023-02-15 RX ADMIN — IPRATROPIUM BROMIDE 0.5 MG: 0.5 SOLUTION RESPIRATORY (INHALATION) at 09:32

## 2023-02-15 RX ADMIN — PREDNISONE 40 MG: 20 TABLET ORAL at 09:32

## 2023-02-15 NOTE — DISCHARGE INSTRUCTIONS
Use your inhaler and nebulizer as discussed  Take the prescribed steroid as directed as well  Follow-up with your primary care physician  Please return to the emergency department if you develop worsening symptoms, difficulty breathing, severe pain, or anything else concerning to you

## 2023-02-15 NOTE — ED PROVIDER NOTES
History  Chief Complaint   Patient presents with   • Asthma     Pt reports has asthma is acting up, for the last 2 days  Pt reports she would have done a treatment but is out of meds  23-year-old female with history of asthma who presents for evaluation of shortness of breath  Patient reports that she noted her asthma began to flare approximately 2 days ago  She has had chest tightness, cough, shortness of breath  Her symptoms have been progressively worsening  She has been using her inhaler with minimal relief  She has a nebulizer at home but is out of her albuterol medication and so was unable to trial this  She has not had any rhinorrhea, sore throat, fevers  She complains of occasional chest pain when coughing  She has not had any abdominal pain, nausea, diarrhea  She had 1 episode of posttussive emesis this morning but has not had any other vomiting  No known sick contacts  Prior to Admission Medications   Prescriptions Last Dose Informant Patient Reported? Taking? Nebulizers (Compressor Nebulizer) MISC   No No   Sig: Use 1 Units every 6 (six) hours as needed (wheezing) Diagnosis asthma  May substitute brand available  albuterol (2 5 mg/3 mL) 0 083 % nebulizer solution   No No   Sig: Take 3 mL (2 5 mg total) by nebulization every 6 (six) hours as needed for wheezing or shortness of breath   albuterol (PROVENTIL HFA,VENTOLIN HFA) 90 mcg/act inhaler   Yes No   Sig: Inhale 1 puff     Patient not taking: Reported on 2023   albuterol (ProAir HFA) 90 mcg/act inhaler Not Taking  No No   Sig: Inhale 2 puffs every 4 (four) hours as needed for wheezing   Patient not taking: Reported on 2/15/2023   norgestimate-ethinyl estradiol (Sprintec 28) 0 25-35 MG-MCG per tablet   No No   Sig: Take 1 tablet by mouth daily for 28 days      Facility-Administered Medications: None       Past Medical History:   Diagnosis Date   • 38 weeks gestation of pregnancy 2016   • Asthma    •  (spontaneous vaginal delivery) 2/12/2016   • UTI in pregnancy        History reviewed  No pertinent surgical history  Family History   Problem Relation Age of Onset   • Hypertension Mother    • No Known Problems Sister    • No Known Problems Sister    • No Known Problems Sister    • Spina bifida Brother    • No Known Problems Brother    • No Known Problems Daughter    • No Known Problems Daughter    • No Known Problems Daughter    • Hypertension Maternal Grandmother    • Deep vein thrombosis Paternal Grandmother    • Breast cancer Neg Hx    • Colon cancer Neg Hx    • Ovarian cancer Neg Hx      I have reviewed and agree with the history as documented  E-Cigarette/Vaping   • E-Cigarette Use Current Every Day User      E-Cigarette/Vaping Substances   • Nicotine Yes    • THC No    • CBD No    • Flavoring Yes    • Other No    • Unknown No      Social History     Tobacco Use   • Smoking status: Some Days     Packs/day: 0 20     Types: Cigarettes   • Smokeless tobacco: Never   Vaping Use   • Vaping Use: Every day   • Substances: Nicotine, Flavoring   Substance Use Topics   • Alcohol use: Yes     Alcohol/week: 1 0 standard drink     Types: 1 Glasses of wine per week   • Drug use: No       Review of Systems   Constitutional: Negative for chills and fever  HENT: Negative for congestion and sore throat  Eyes: Negative for visual disturbance  Respiratory: Positive for cough, chest tightness, shortness of breath and wheezing  Cardiovascular: Positive for chest pain  Negative for leg swelling  Gastrointestinal: Positive for vomiting  Negative for abdominal pain, diarrhea and nausea  Genitourinary: Negative for flank pain  Musculoskeletal: Negative for gait problem  Skin: Negative for rash  Neurological: Negative for weakness and headaches  All other systems reviewed and are negative  Physical Exam  Physical Exam  Vitals and nursing note reviewed  Constitutional:       General: She is not in acute distress  Appearance: She is well-developed  She is not ill-appearing  HENT:      Head: Normocephalic and atraumatic  Nose: Nose normal       Mouth/Throat:      Mouth: Mucous membranes are moist    Eyes:      Extraocular Movements: Extraocular movements intact  Cardiovascular:      Rate and Rhythm: Normal rate and regular rhythm  Heart sounds: No murmur heard  No friction rub  No gallop  Pulmonary:      Effort: Pulmonary effort is normal       Breath sounds: Wheezing (scattered expiratory wheezes) present  No rhonchi or rales  Abdominal:      General: There is no distension  Palpations: Abdomen is soft  Tenderness: There is no abdominal tenderness  Musculoskeletal:         General: No swelling or tenderness  Normal range of motion  Cervical back: Normal range of motion and neck supple  Skin:     General: Skin is warm and dry  Coloration: Skin is not pale  Findings: No rash  Neurological:      General: No focal deficit present  Mental Status: She is alert and oriented to person, place, and time     Psychiatric:         Behavior: Behavior normal          Vital Signs  ED Triage Vitals [02/15/23 0914]   Temperature Pulse Respirations Blood Pressure SpO2   98 7 °F (37 1 °C) 83 20 132/84 97 %      Temp Source Heart Rate Source Patient Position - Orthostatic VS BP Location FiO2 (%)   Oral Monitor Lying Left arm --      Pain Score       6           Vitals:    02/15/23 0914   BP: 132/84   Pulse: 83   Patient Position - Orthostatic VS: Lying         Visual Acuity      ED Medications  Medications   albuterol inhalation solution 5 mg (5 mg Nebulization Given 2/15/23 0932)   ipratropium (ATROVENT) 0 02 % inhalation solution 0 5 mg (0 5 mg Nebulization Given 2/15/23 0932)   predniSONE tablet 40 mg (40 mg Oral Given 2/15/23 0932)       Diagnostic Studies  Results Reviewed     Procedure Component Value Units Date/Time    POCT pregnancy, urine [[id number]]  (Normal) Resulted: 02/15/23 0930    Lab Status: Final result Updated: 02/15/23 0931     EXT Preg Test, Ur Negative     Control Valid                 XR chest 1 view portable   Final Result by Crow Moran MD (02/15 1353)      No acute cardiopulmonary disease  Workstation performed: [id number]                    Procedures  Procedures         ED Course  ED Course as of 02/15/23 1744   Wed Feb 15, 2023   4520 Procedure Note: EKG  Date/Time: 02/15/23 9:15 AM   Interpreted by: Shelton Lawson  Indications / Diagnosis: shortness of breath  ECG reviewed by me, the ED Provider: yes   The EKG demonstrates:  Rhythm: rate 78, normal sinus  Intervals: normal intervals  Axis: normal axis  QRS/Blocks: normal QRS  ST Changes: No acute ST Changes, no STD/NETO     0936 PREGNANCY TEST URINE: Negative   1029 Patient re-evaluated  Feeling improved after treatment  Feels ready for discharge  PERC Rule for PE    Flowsheet Row Most Recent Value   PERC Rule for PE    Age >=50 0 Filed at: 02/15/2023 0923   HR >=100 0 Filed at: 02/15/2023 0923   O2 Sat on room air < 95% 0 Filed at: 02/15/2023 3412   History of PE or DVT 0 Filed at: 02/15/2023 4416   Recent trauma or surgery 0 Filed at: 02/15/2023 7417   Hemoptysis 0 Filed at: 02/15/2023 5308   Exogenous estrogen 0 Filed at: 02/15/2023 5830   Unilateral leg swelling 0 Filed at: 02/15/2023 1748   PERC Rule for PE Results 0 Filed at: 02/15/2023 1491                            Medical Decision Making  74-year-old female presenting for shortness of breath  Differential diagnoses include but not limited to asthma exacerbation, pneumonia, pneumothorax  History and exam not consistent with PE, ACS  Chest x-ray without any acute pathology  EKG unremarkable  Patient feeling significantly improved after symptomatic treatment  Likely asthma exacerbation  Advised follow-up with primary care physician  Return precautions discussed      Mild intermittent asthma with acute exacerbation: acute illness or injury  Amount and/or Complexity of Data Reviewed  Labs: ordered  Decision-making details documented in ED Course  Radiology: ordered  ECG/medicine tests: ordered and independent interpretation performed  Decision-making details documented in ED Course  Risk  Prescription drug management  Disposition  Final diagnoses:   Mild intermittent asthma with acute exacerbation     Time reflects when diagnosis was documented in both MDM as applicable and the Disposition within this note     Time User Action Codes Description Comment    2/15/2023 10:30 AM Anjana Burnett [J45 21] Mild intermittent asthma with acute exacerbation       ED Disposition     ED Disposition   Discharge    Condition   Stable    Date/Time   Wed Feb 15, 2023 10:30 AM    Comment   Jay Counts discharge to home/self care  Follow-up Information     Follow up With Specialties Details Why Karla Morrow MD Internal Medicine In 2 days  [facility]            Discharge Medication List as of 2/15/2023 10:32 AM      START taking these medications    Details   predniSONE 20 mg tablet Take 2 tablets (40 mg total) by mouth daily for 5 days, Starting Wed 2/15/2023, Until Mon 2/20/2023, Normal         CONTINUE these medications which have CHANGED    Details   albuterol (2 5 mg/3 mL) 0 083 % nebulizer solution Take 3 mL (2 5 mg total) by nebulization every 6 (six) hours as needed for wheezing or shortness of breath, Starting Wed 2/15/2023, Until Fri 3/17/2023 at 2359, Normal      albuterol (ProAir HFA) 90 mcg/act inhaler Inhale 2 puffs every 4 (four) hours as needed for wheezing, Starting Wed 2/15/2023, Normal         CONTINUE these medications which have NOT CHANGED    Details   Nebulizers (Compressor Nebulizer) MISC Use 1 Units every 6 (six) hours as needed (wheezing) Diagnosis asthma   May substitute brand available , Starting Wed 8/18/2021, Until Fri 9/17/2021 at 2359, Print      norgestimate-ethinyl estradiol (Sprintec 28) 0 25-35 MG-MCG per tablet Take 1 tablet by mouth daily for 28 days, Starting Thu 2/2/2023, Until Thu 3/2/2023, Normal             No discharge procedures on file      PDMP Review     None          ED Provider  Electronically Signed by           Mesha Cruz MD  02/15/23 4487

## 2023-02-16 ENCOUNTER — PROCEDURE VISIT (OUTPATIENT)
Dept: OBGYN CLINIC | Facility: CLINIC | Age: 33
End: 2023-02-16

## 2023-02-16 VITALS
RESPIRATION RATE: 18 BRPM | HEIGHT: 63 IN | HEART RATE: 91 BPM | BODY MASS INDEX: 34.55 KG/M2 | WEIGHT: 195 LBS | SYSTOLIC BLOOD PRESSURE: 102 MMHG | DIASTOLIC BLOOD PRESSURE: 69 MMHG

## 2023-02-16 DIAGNOSIS — R87.619 ABNORMAL CERVICAL PAPANICOLAOU SMEAR, UNSPECIFIED ABNORMAL PAP FINDING: ICD-10-CM

## 2023-02-16 DIAGNOSIS — Z32.00 ENCOUNTER FOR PREGNANCY TEST, RESULT UNKNOWN: Primary | ICD-10-CM

## 2023-02-16 LAB — SL AMB POCT URINE HCG: NORMAL

## 2023-02-16 NOTE — PROGRESS NOTES
Colposcopy     Date/Time 2/16/2023 11:15 AM     Universal Protocol   Procedure performed by: John Wright)  Consent: Verbal consent obtained  Written consent obtained  Risks and benefits: risks, benefits and alternatives were discussed  Consent given by: patient  Time out: Immediately prior to procedure a "time out" was called to verify the correct patient, procedure, equipment, support staff and site/side marked as required    Patient understanding: patient states understanding of the procedure being performed  Patient consent: the patient's understanding of the procedure matches consent given  Relevant documents: relevant documents present and verified  Required items: required blood products, implants, devices, and special equipment available  Patient identity confirmed: verbally with patient       Performed by  Jodell Gilford, MD     Authorized by Jodell Gilford, MD        Pre-procedure details     Pre-procedure timeout performed: yes      Prepped with: acetic acid       Indication    ASC-US     Procedure Details   Procedure: Colposcopy w/ biopsy of cervix      Under satisfactory analgesia the patient was prepped and draped in the dorsal lithotomy position: yes      [place] speculum was placed in the vagina: yes      Under colposcopic examination the transition zone was seen in entirety: yes      Intracervical block was performed: no      Cervical biopsy performed with a cervical biopsy punch: yes      Tampon inserted: no      Monsel's solution was applied: yes      Biopsy(s): yes      Location:  7:00, 12:00    Specimen to pathology: yes       Post-procedure      Findings: Bleeding      Impression: Low grade cervical dysplasia

## 2023-03-02 ENCOUNTER — OFFICE VISIT (OUTPATIENT)
Dept: OBGYN CLINIC | Facility: CLINIC | Age: 33
End: 2023-03-02

## 2023-03-02 VITALS
SYSTOLIC BLOOD PRESSURE: 110 MMHG | WEIGHT: 197 LBS | HEART RATE: 94 BPM | RESPIRATION RATE: 16 BRPM | DIASTOLIC BLOOD PRESSURE: 74 MMHG | BODY MASS INDEX: 34.9 KG/M2

## 2023-03-02 DIAGNOSIS — N87.1 CIN II (CERVICAL INTRAEPITHELIAL NEOPLASIA II): Primary | ICD-10-CM

## 2023-03-02 NOTE — PROGRESS NOTES
H and P    Subjective    Patient is a 35 yo  who presents today to discuss the results of her recent colposcopy, which demonstrated LSIL/DOMITILA 1 at the 7:00 position and HSIL/DOMITILA 2 at the 12:00 position  Her Pap smear had demonstrated ASCUS and high risk HPV other  We discussed the recommendation for LEEP as a diagnostic and therapeutic procedure  We discussed the procedure in detail, including risks, benefits, and post-operative expectations  We reviewed the risks of bleeding, infection, and injury to surrounding structures, including the vaginal, bladder, and rectum  Importantly, the patient states that she declines a blood transfusion, even in a life-saving scenario  Of note, the patient was recently treated for an asthma exacerbation in the ED  She received albuterol treatment and steroids  She states that her symptoms have improved significantly, and are usually exacerbated by cold weather and allergies  She does not currently have a PCP or other provider who manages her asthma  We discussed the importance of obtaining a PCP to help manage her asthma  OB History        3    Para   3    Term   3            AB        Living   3       SAB        IAB        Ectopic        Multiple   0    Live Births   3                        Objective    Vitals:    23 1024   BP: 110/74   BP Location: Right arm   Patient Position: Sitting   Cuff Size: Standard   Pulse: 94   Resp: 16   Weight: 89 4 kg (197 lb)        Physical Exam  Constitutional:       Appearance: Normal appearance  HENT:      Head: Normocephalic and atraumatic  Cardiovascular:      Rate and Rhythm: Normal rate  Heart sounds: Normal heart sounds  Pulmonary:      Effort: Pulmonary effort is normal  No respiratory distress  Breath sounds: Normal breath sounds  Abdominal:      Palpations: Abdomen is soft  Tenderness: There is no abdominal tenderness  Musculoskeletal:         General: No deformity   Normal range of motion  Neurological:      Mental Status: She is alert  Skin:     General: Skin is warm and dry            Assessment    Patient Active Problem List   Diagnosis   • Encounter for counseling regarding contraception   • High risk heterosexual behavior   • Encounter for well woman exam with routine gynecological exam   • IUD (intrauterine device) in place   • Encounter for initial prescription of contraceptive pills   • Trichomonas vaginalis infection   • Bacterial vaginosis   • Encounter for IUD removal        Plan  - consent signed today for a LEEP procedure, exam under anesthesia  - Patient does NOT consent to a blood transfusion, even in a life-saving scenario; will need to sign blood refusal form at time of surgery  - Referral provided to primary care to address recent asthma exacerbation

## 2023-03-10 ENCOUNTER — TELEPHONE (OUTPATIENT)
Dept: OTHER | Facility: HOSPITAL | Age: 33
End: 2023-03-10

## 2023-03-10 NOTE — TELEPHONE ENCOUNTER
Case request for LEEP procedure approved by surgical review committee  Encourage patient to follow up with PCP for asthma at her H and P visit      Clara Vaca MD  OB/GYN PGY-4  3/10/2023  3:03 PM

## 2023-03-17 ENCOUNTER — PREP FOR PROCEDURE (OUTPATIENT)
Dept: OBGYN CLINIC | Facility: CLINIC | Age: 33
End: 2023-03-17

## 2023-03-17 DIAGNOSIS — Z01.818 PREOP TESTING: ICD-10-CM

## 2023-03-17 DIAGNOSIS — N87.1 MODERATE DYSPLASIA OF CERVIX (CIN II): Primary | ICD-10-CM

## 2023-03-17 NOTE — TELEPHONE ENCOUNTER
S/w pt H&P schedule on 3/27/23 Dick Tucker, Sx on 3/31/23 ashok Curry was informed of pre admission testing prior to sx, pt understood and was in agreement with all instructions given

## 2023-03-25 PROBLEM — Z01.419 ENCOUNTER FOR WELL WOMAN EXAM WITH ROUTINE GYNECOLOGICAL EXAM: Status: RESOLVED | Noted: 2023-01-24 | Resolved: 2023-03-25

## 2023-03-27 ENCOUNTER — OFFICE VISIT (OUTPATIENT)
Dept: OBGYN CLINIC | Facility: CLINIC | Age: 33
End: 2023-03-27

## 2023-03-27 VITALS
SYSTOLIC BLOOD PRESSURE: 128 MMHG | HEIGHT: 63 IN | BODY MASS INDEX: 35.44 KG/M2 | HEART RATE: 75 BPM | WEIGHT: 200 LBS | DIASTOLIC BLOOD PRESSURE: 70 MMHG

## 2023-03-27 DIAGNOSIS — N87.1 CIN II (CERVICAL INTRAEPITHELIAL NEOPLASIA II): ICD-10-CM

## 2023-03-27 DIAGNOSIS — Z32.01 POSITIVE PREGNANCY TEST: Primary | ICD-10-CM

## 2023-03-27 DIAGNOSIS — O21.9 NAUSEA AND VOMITING IN PREGNANCY: ICD-10-CM

## 2023-03-27 DIAGNOSIS — Z3A.08 8 WEEKS GESTATION OF PREGNANCY: ICD-10-CM

## 2023-03-27 LAB — SL AMB POCT URINE HCG: POSITIVE

## 2023-03-27 RX ORDER — DIPHENHYDRAMINE HYDROCHLORIDE 25 MG/1
25 CAPSULE ORAL DAILY
Qty: 30 TABLET | Refills: 3 | Status: SHIPPED | OUTPATIENT
Start: 2023-03-27

## 2023-03-27 RX ORDER — PNV NO.95/FERROUS FUM/FOLIC AC 28MG-0.8MG
1 TABLET ORAL DAILY
Qty: 30 TABLET | Refills: 9 | Status: SHIPPED | OUTPATIENT
Start: 2023-03-27

## 2023-03-27 NOTE — PROGRESS NOTES
[address]   Viability Scan  Name: Ted Akins  MRN: [id number]  : 1990      ASSESSMENT/PLAN:  Problem   8 Weeks Gestation of Pregnancy    Pompa IUP identified  Cardiac activity detected  CRL c/w GA 8w3d  Prenatal labs and vitamins ordered  Ambulatory referral to [facility]  RTC in 4w for prenatal H&P      Nausea and Vomiting in Pregnancy    Vitamin B6 and unisom ordered for management      Loreta II (Cervical Intraepithelial Neoplasia Ii)    Discussed risk of advancement of disease given current decline of surgical management with new pregnancy of 8w gestation  Recommendation per ASCCP guidelines for colposcopy surveillance every 12-24w during pregnancy to monitor for clinically appreciated advancement of condition  Will currently plan to pursue prenatal care with eventual LEEP procedure in the postpartum period           SUBJECTIVE:  Mary Thomas is a pleasant 29yo [id number] female who initially presented today for surgical H&P for LEEP in the setting of confirmed HSIL/CIN2 via colposcopy  Patient was previously counseled regarding risks and benefits of surgical management vs  Expectant management including risk of disease progression and concern for evolution of cervical cancer  We discussed in detail HPV being a significant risk factor for cervical cancer and the current dysplasia already appreciated through colposcopic biopsy  At this time, Kalyani reports new pregnancy  LMP unknown  Ultrasound completed today c/w pompa IUP at 8w3d gestation  This is a highly desired pregnancy although unplanned  Chardonnay reports that she does not desire termination and expressed understanding regarding her current diagnosis of CIN2 and risk of disease progression  We discussed active surveillance through colposcopic examination every 12-24 weeks as recommended by the ASCCP guidelines   We also discussed that in the situation where disease progression is clinically assessed, an excision procedure may be recommended which may results in bleeding, infection, cervical insuffiencey,  labor, or loss of the pregnancy  Patient expressed understanding and is agreeable at this time to surveillance with colposcopy during this pregnancy  We discussed prenatal care with MFM referral  Mary Thomas plans to return for prenatal H&P  All questions answered during visitation  OBJECTIVE:  Vitals:    23 1605   BP: 128/70   Pulse: 75       Physical Exam  Vitals and nursing note reviewed  Exam conducted with a chaperone present  Constitutional:       General: She is not in acute distress  HENT:      Head: Normocephalic  Right Ear: External ear normal       Left Ear: External ear normal    Eyes:      General: No scleral icterus  Right eye: No discharge  Left eye: No discharge  Conjunctiva/sclera: Conjunctivae normal    Cardiovascular:      Rate and Rhythm: Normal rate and regular rhythm  Pulses: Normal pulses  Heart sounds: Normal heart sounds  Pulmonary:      Effort: Pulmonary effort is normal  No respiratory distress  Breath sounds: Normal breath sounds  Abdominal:      General: Abdomen is flat  There is no distension  Palpations: Abdomen is soft  Tenderness: There is no abdominal tenderness  There is no guarding  Genitourinary:     General: Normal vulva  Vagina: No vaginal discharge  Musculoskeletal:         General: No swelling or tenderness  Normal range of motion  Cervical back: Normal range of motion  Right lower leg: No edema  Left lower leg: No edema  Skin:     General: Skin is warm and dry  Capillary Refill: Capillary refill takes less than 2 seconds  Neurological:      Mental Status: She is alert and oriented to person, place, and time  Mental status is at baseline     Psychiatric:         Mood and Affect: Mood normal          Behavior: Behavior normal          FIRST TRIMESTER OBSTETRIC ULTRASOUND  Nava Chery MD INDICATION: Amenorrhea, viability    COMPARISON: None  TECHNIQUE:   Transvaginal imaging was performed to assess the gestation, myometrial/endometrial architecture and ovarian parenchymal detail  The study includes volumetric sweeps and traditional still imaging technique  FINDINGS:     A single intrauterine gestation is identified  Cardiac activity is detected      YOLK SAC:  Present and normal in size and appearance  MEAN CROWN RUMP LENGTH:  1 94 cm = 8 weeks 3 days   AMNIOTIC FLUID/SAC SHAPE:  Within expected normal range  UTERUS/ADNEXA:   No adnexal mass or pathologic cyst   No free fluid identified  Left ovary not visualized  IMPRESSION:    According to , will date based off this ultrasound  Final LOIDA: 11/3/2023  Gestational age: 9w3d  Fetal cardiac activity detected  No adnexal masses seen        Dilma Crespo MD  OB/GYN PGY-2  3/27/2023  4:27 PM

## 2023-03-29 PROBLEM — O21.9 NAUSEA AND VOMITING IN PREGNANCY: Status: ACTIVE | Noted: 2023-03-29

## 2023-03-29 PROBLEM — Z3A.08 8 WEEKS GESTATION OF PREGNANCY: Status: ACTIVE | Noted: 2023-03-29

## 2023-04-25 ENCOUNTER — ROUTINE PRENATAL (OUTPATIENT)
Facility: HOSPITAL | Age: 33
End: 2023-04-25

## 2023-04-25 VITALS
WEIGHT: 194.8 LBS | SYSTOLIC BLOOD PRESSURE: 102 MMHG | BODY MASS INDEX: 34.52 KG/M2 | HEART RATE: 99 BPM | DIASTOLIC BLOOD PRESSURE: 64 MMHG | HEIGHT: 63 IN

## 2023-04-25 DIAGNOSIS — Z3A.08 8 WEEKS GESTATION OF PREGNANCY: ICD-10-CM

## 2023-04-25 DIAGNOSIS — Z36.82 ENCOUNTER FOR (NT) NUCHAL TRANSLUCENCY SCAN: Primary | ICD-10-CM

## 2023-04-25 DIAGNOSIS — Z3A.12 12 WEEKS GESTATION OF PREGNANCY: ICD-10-CM

## 2023-04-25 PROBLEM — Z30.432 ENCOUNTER FOR IUD REMOVAL: Status: RESOLVED | Noted: 2023-02-02 | Resolved: 2023-04-25

## 2023-04-25 PROBLEM — O99.210 OBESITY AFFECTING PREGNANCY: Status: ACTIVE | Noted: 2023-04-25

## 2023-04-25 PROBLEM — Z30.09 ENCOUNTER FOR COUNSELING REGARDING CONTRACEPTION: Status: RESOLVED | Noted: 2023-01-17 | Resolved: 2023-04-25

## 2023-04-25 PROBLEM — Z30.011 ENCOUNTER FOR INITIAL PRESCRIPTION OF CONTRACEPTIVE PILLS: Status: RESOLVED | Noted: 2023-02-02 | Resolved: 2023-04-25

## 2023-04-25 PROBLEM — J45.909 ASTHMA: Status: ACTIVE | Noted: 2023-04-25

## 2023-04-25 PROBLEM — Z97.5 IUD (INTRAUTERINE DEVICE) IN PLACE: Status: RESOLVED | Noted: 2023-01-24 | Resolved: 2023-04-25

## 2023-04-25 PROBLEM — O26.30 PREGNANCY WITH IUD IN PLACE, ANTEPARTUM: Status: ACTIVE | Noted: 2023-04-25

## 2023-04-25 RX ORDER — ASPIRIN 81 MG/1
162 TABLET, CHEWABLE ORAL DAILY
Qty: 60 TABLET | Refills: 5 | Status: SHIPPED | OUTPATIENT
Start: 2023-04-25

## 2023-04-25 NOTE — LETTER
"2023     Andrew Nelson MD  [address] [address]    Patient: Rudy Isaacs   YOB: 1990   Date of Visit: 2023       Dear Dr Yury Tran:    Thank you for referring Rudy Isaacs to me for evaluation  Below are my notes for this consultation  If you have questions, please do not hesitate to call me  I look forward to following your patient along with you  Sincerely,        Andreina Gutierrez MD        CC: No Recipients  Andreina Gutierrez MD  2023  6:19 PM  Sign when Signing Visit  [address]: Ms Kathia Samuels was seen today at 12w4d for nuchal translucency ultrasound  See ultrasound report under \"OB Procedures\" tab  My recommendations are as follows:  1  We reviewed the availability of genetic screening, as well as diagnostic testing, which are available to all pregnant women  We reviewed limitations, risks, and benefits of screening and testing  She elected to proceed with Non Invasive Prenatal Screening (NIPS)  MSAFP screening should be ordered through your office at 15-20 weeks gestation, and completed prior to fetal anatomic survey  She does not wish to pursue diagnostic testing at this time  A detailed anatomic survey as well as transvaginal cervical length screening are recommended between 18-22 weeks gestation  I reached out to [facility] for confirmation that Chardonnay's IUD was completely removed in February of this year  I did not see evidence of an IUD on today's ultrasound, but procedure note includes documentation of a difficult extraction and that strings broke during extraction  If IUD is still in situ, further counseling regarding pregnancy risks would be advised  2  Body mass index > 30 kg/m2 is associated with an increased risk of several pregnancy complications, including hypertensive disorders, diabetes, abnormal fetal growth, fetal malformations   The risk of  " delivery is also increased, as are wound complications in the event of  delivery  A healthy diet and exercise, as well as appropriate gestational weight gain (no more than 11-20 pounds) can help reduce risk of these complications  150 minutes of moderate exercise per week is recommended for all pregnant women  Nutrition counseling is also available if desired  Early screening for gestational diabetes is recommended, as well as routine re-screening at 24-28 weeks if early screening results are normal    3  The use of low dose aspirin in pregnancy (162mg) is recommended in women with a high risk, or multiple moderate risk factors for preeclampsia  Aspirin therapy should be initiated between 12-28 weeks gestation, and is most effective if started prior to 16 weeks gestation, and stopped by 36 weeks gestation  Low dose aspirin in pregnancy has been shown to reduce the incidence of preeclampsia in women with risk factors, and has been shown to be safe and without significant maternal or fetal risk  In light of her risk factors which include pre-gravid BMI and ethnic background, I recommend initiating aspirin therapy       Please don't hesitate to contact our office with any concerns or questions     -Will Melgoza MD

## 2023-04-25 NOTE — PROGRESS NOTES
"Patient chose to have [facility] Non-invasive Prenatal Screen with fetal sex  Patient given brochure and is aware [facility] will contact their insurance and coordinate coverage  Patient made aware she will need to respond to text message or e-mail from [employer] within 2 business days or testing will be run through insurance  Patient informed text message will come from area code  \"415\"  Provided [employer] # [phone] and web site : [email or web address]  \"[place] your test online\" card with barcode and test tube ID provided to patient  Reviewed [facility]'s web site states 5-7 business days for results via their portal    [employer] message will be sent to patient when MFM receives results /provider reviews  2 vials of blood drawn from LEFT arm by Ros[address]  Patient tolerated blood draw without difficulty  Specimens labeled with patient identifiers (name, date of birth, specimen collection date), order and specimen were verified with patient, packed and sent via [employer]  Copy of lab order scanned to Epic media  [facility] will have results in approximately 7-10 business days and will call patient or notify via [address]  Patient aware viewing lab result online will reveal fetal sex if ordered  Patient verbalized understanding of all instructions and no questions at this time    "

## 2023-04-25 NOTE — PROGRESS NOTES
"[address]: Ms Merrick Moon was seen today at 12w4d for nuchal translucency ultrasound  See ultrasound report under \"OB Procedures\" tab  My recommendations are as follows:  1  We reviewed the availability of genetic screening, as well as diagnostic testing, which are available to all pregnant women  We reviewed limitations, risks, and benefits of screening and testing  She elected to proceed with Non Invasive Prenatal Screening (NIPS)  MSAFP screening should be ordered through your office at 15-20 weeks gestation, and completed prior to fetal anatomic survey  She does not wish to pursue diagnostic testing at this time  A detailed anatomic survey as well as transvaginal cervical length screening are recommended between 18-22 weeks gestation  I reached out to [facility] for confirmation that Chardonnay's IUD was completely removed in February of this year  I did not see evidence of an IUD on today's ultrasound, but procedure note includes documentation of a difficult extraction and that strings broke during extraction  If IUD is still in situ, further counseling regarding pregnancy risks would be advised  2  Body mass index > 30 kg/m2 is associated with an increased risk of several pregnancy complications, including hypertensive disorders, diabetes, abnormal fetal growth, fetal malformations  The risk of  delivery is also increased, as are wound complications in the event of  delivery  A healthy diet and exercise, as well as appropriate gestational weight gain (no more than 11-20 pounds) can help reduce risk of these complications  150 minutes of moderate exercise per week is recommended for all pregnant women  Nutrition counseling is also available if desired    Early screening for gestational diabetes is recommended, as well as routine re-screening at 24-28 weeks if early screening results are normal    3  The use of low dose aspirin in pregnancy (162mg) is " recommended in women with a high risk, or multiple moderate risk factors for preeclampsia  Aspirin therapy should be initiated between 12-28 weeks gestation, and is most effective if started prior to 16 weeks gestation, and stopped by 36 weeks gestation  Low dose aspirin in pregnancy has been shown to reduce the incidence of preeclampsia in women with risk factors, and has been shown to be safe and without significant maternal or fetal risk  In light of her risk factors which include pre-gravid BMI and ethnic background, I recommend initiating aspirin therapy       Please don't hesitate to contact our office with any concerns or questions     -Tyron Nageotte, MD

## 2023-04-26 PROBLEM — O26.30 PREGNANCY WITH IUD IN PLACE, ANTEPARTUM: Status: RESOLVED | Noted: 2023-04-25 | Resolved: 2023-04-26

## 2023-04-26 NOTE — PROGRESS NOTES
[facility]  OB/GYN prenatal visit    S: 34 y o  [id number] 12w5d here for PN visit  She has *** obstetric complaints, including pelvic pain, contractions, vaginal bleeding, loss of fluid, or decreased fetal movement  ***    O:  There were no vitals filed for this visit      Gen: no acute distress, nonlabored breathing          A/P:      IUP at 12w5d  No obstetric complaints today  TWG: + ***   Vaccinations: ***  Contraception: ***  Breastfeeding: ***  Birth plan: *** discussed elective induction/repeat /TOLAC, patient opts for ***  Discussed pre***term labor precautions and fetal kick counts    Return to clinic in *** weeks    COVID vaccine: ***    COVID 19 precautions were discussed with patient at length, reviewed symptoms, hygiene, social distancing, patient to call office  with questions/concerns    {To Do List (Optional):87890}    MICHELLE Wang  2023  4:35 PM

## 2023-04-27 ENCOUNTER — ROUTINE PRENATAL (OUTPATIENT)
Dept: OBGYN CLINIC | Facility: CLINIC | Age: 33
End: 2023-04-27

## 2023-04-27 VITALS
HEART RATE: 71 BPM | HEIGHT: 63 IN | RESPIRATION RATE: 18 BRPM | SYSTOLIC BLOOD PRESSURE: 101 MMHG | BODY MASS INDEX: 34.2 KG/M2 | DIASTOLIC BLOOD PRESSURE: 64 MMHG | WEIGHT: 193 LBS

## 2023-04-27 DIAGNOSIS — Z3A.12 12 WEEKS GESTATION OF PREGNANCY: ICD-10-CM

## 2023-04-27 DIAGNOSIS — N87.1 CIN II (CERVICAL INTRAEPITHELIAL NEOPLASIA II): ICD-10-CM

## 2023-04-27 DIAGNOSIS — Z34.91 PRENATAL CARE IN FIRST TRIMESTER: Primary | ICD-10-CM

## 2023-04-27 LAB
BV WHIFF TEST VAG QL: NORMAL
CLUE CELLS SPEC QL WET PREP: NORMAL
PH SMN: 4.5 [PH]
SL AMB POCT WET MOUNT: NORMAL
SPERM URNS QL MICRO: NORMAL
T VAGINALIS VAG QL WET PREP: NORMAL
YEAST VAG QL WET PREP: NORMAL

## 2023-04-27 NOTE — PROGRESS NOTES
"[facility]  Patient is a [id number] here for initial prenatal H&P  This is an unintended pregnancy  Patient reports that her LMP unknown   Patient is currently doing well, n& V only at night, is improving  She is here by her self   Her previous pregnancies have been   x3  She currently works at home   She has good a support system at home  She has a hx of asthma  She was planning to undergo a LEEP prior to learning she was pregnant  She does not have a known family history of inheritable conditions such as physical or intellectual disabilities, birth defects, blood disorders, heart or neural tube defects  She denies recent travel  She denies current use of nicotine, she quit when she found out she was pregnant, no EtOH or recreational drug use  OB History    Para Term  AB Living   4 3 3     3   SAB IAB Ectopic Multiple Live Births         0 3      # Outcome Date GA Lbr Warren/2nd Weight Sex Delivery Anes PTL Lv   4 Current            3 Term 16 38w5d 04:35 / 00:09 2353 g (5 lb 3 oz) F Vag-Spont None N MARTIN   2 Term         MARTIN   1 Term         MARTIN         Objective:   /64 (BP Location: Right arm, Patient Position: Sitting, Cuff Size: Adult)   Pulse 71   Resp 18   Ht 5' 3\" (1 6 m)   Wt 87 5 kg (193 lb)   LMP  (LMP Unknown)   BMI 34 19 kg/m²     General:   alert and oriented, in no acute distress, alert, appears stated age and cooperative   Heart: regular rate and rhythm, S1, S2 normal, no murmur, click, rub or gallop   Lungs: clear to auscultation bilaterally   Abdomen: soft, non-tender, without masses or organomegaly   Vulva: Bartholin's, Urethra, Fossil's normal   Vagina: normal discharge   Cervix: no cervical motion tenderness and no lesions   Uterus: size consistent with 12 weeks   Adnexa: no mass, fullness, tenderness         Assessment and Plan:  1  LMP unknown   2  TVUS 3/27/23 showed 8w3d with an LOIDA 11/3/23  3   Pap smear " 1/224/23 ASCUS positive HPV other, She had a colposcopy done 2/16/23 that showed LSIL, DOMITILA and HGSIL, CIN2, she was to get a LEEP procedure  To have a colpo every 12-24 wks during  Pregnancy to monitor advancement of condition  Scheduled for 5/16/23  Was treated for positive trichomonas and BV 2/2/23  4  GC/CT collected  Wet mt negative for Trichomonas, BV and Yeast  5  Prenatal labs - she needs to complete  6  Postpartum: patient plans on bottle feeding  Different methods of contraception were discussed with patient, including progesterone only oral pills, depo provera, nexplanon, mirena, and paragard  Patient would like to use depo until tubal  Aware it is permanent and she does not want any more children  7  Delivery consent form to be signed at 28 weeks  Sign MA 31 at 28 wks  8  Sequential screening: done 4/25/23, level 2 scheduled  9  Labor expectations discussed with patient, including appointment schedule, nutrition, weight gain, sexual intercourse, and nausea and vomiting  Recommended weight gain of 11-20 lbs  10  RTC in 4 weeks  Continue PNV QD  11  To start LDASA 162 mgs daily until 36 wks

## 2023-04-28 LAB
C TRACH DNA SPEC QL NAA+PROBE: NEGATIVE
N GONORRHOEA DNA SPEC QL NAA+PROBE: NEGATIVE

## 2023-05-05 NOTE — ED PROVIDER NOTES
CM noted rec of Skilled.  Patient is reluctant to any facility.  Referral sent to [facility].   05/05/23 9691   Post-Acute Status   Post-Acute Authorization Placement   Post-Acute Placement Status Referrals Sent        History  Chief Complaint   Patient presents with    Ankle Injury     A few hours ago, twisted the left ankle  30-year-old female with past medical history of asthma, who presents to emergency department for left ankle pain and swelling status post ankle injury while she was ambulating up some stairs yesterday  Patient states that she did not have any pain when she went to bed, but woke up with 10/10 pain with light touch to the left ankle  Pain is worse with ambulation and with movement and light palpation  Denies redness or warmth  Denies numbness, tingling, weakness  Took Tylenol with codeine prior to arrival with improvement in pain  History provided by:  Patient   used: No        Prior to Admission Medications   Prescriptions Last Dose Informant Patient Reported? Taking? albuterol (PROVENTIL HFA,VENTOLIN HFA) 90 mcg/act inhaler   Yes No   Sig: Inhale 1 puff  Facility-Administered Medications: None       Past Medical History:   Diagnosis Date    Asthma     UTI in pregnancy        History reviewed  No pertinent surgical history  Family History   Problem Relation Age of Onset    Hypertension Mother     Hypertension Father     Hypertension Maternal Grandmother      I have reviewed and agree with the history as documented  Social History   Substance Use Topics    Smoking status: Current Some Day Smoker     Packs/day: 0 20     Types: Cigarettes    Smokeless tobacco: Never Used    Alcohol use 0 6 oz/week     1 Glasses of wine per week        Review of Systems   Constitutional: Negative for chills and fever  Respiratory: Negative for cough, chest tightness, shortness of breath and wheezing  Cardiovascular: Negative for chest pain, palpitations and leg swelling  Gastrointestinal: Negative for abdominal pain, constipation, diarrhea, nausea and vomiting  Genitourinary: Negative for dysuria, flank pain, frequency, hematuria and urgency     Musculoskeletal: Positive for arthralgias, gait problem and joint swelling  Negative for back pain, myalgias, neck pain and neck stiffness  Skin: Negative for color change, pallor, rash and wound  Neurological: Negative for dizziness, syncope, weakness, light-headedness, numbness and headaches  Physical Exam  Physical Exam   Constitutional: She is oriented to person, place, and time  She appears well-developed and well-nourished  No distress  HENT:   Head: Normocephalic and atraumatic  Eyes: Conjunctivae and EOM are normal  Pupils are equal, round, and reactive to light  Neck: Normal range of motion  Neck supple  Cardiovascular: Normal rate, regular rhythm and intact distal pulses  Pulmonary/Chest: Effort normal  No respiratory distress  Abdominal: Soft  Musculoskeletal: Normal range of motion  She exhibits edema and tenderness (Left lateral malleolus mild tenderness to palpation to the left lateral malleolus and left midfoot  )  Neurological: She is alert and oriented to person, place, and time  No cranial nerve deficit or sensory deficit  She exhibits normal muscle tone  Coordination normal    Skin: Skin is warm and dry  Capillary refill takes less than 2 seconds  She is not diaphoretic  Psychiatric: She has a normal mood and affect  Her behavior is normal    Nursing note and vitals reviewed        Vital Signs  ED Triage Vitals [09/09/18 1431]   Temperature Pulse Respirations Blood Pressure SpO2   97 9 °F (36 6 °C) 100 16 115/68 96 %      Temp Source Heart Rate Source Patient Position - Orthostatic VS BP Location FiO2 (%)   Oral -- Sitting Left arm --      Pain Score       Worst Possible Pain           Vitals:    09/09/18 1431   BP: 115/68   Pulse: 100   Patient Position - Orthostatic VS: Sitting       Visual Acuity      ED Medications  Medications - No data to display    Diagnostic Studies  Results Reviewed     None                 XR foot 3+ views LEFT   ED Interpretation by Lewis Jiménez PA-C (09/09 1514)   No acute osseous abnormality  XR ankle 3+ vw left   ED Interpretation by Mike Canas PA-C (09/09 1514)   No acute osseous abnormality  Procedures  Orthopedic Injury  Date/Time: 9/9/2018 3:38 PM  Performed by: Cole Aragon  Authorized by: Edilma Mantilla     Patient Location:  ED  Verbal consent obtained?: No    Injury location:  Ankle  Location details:  Left ankle  Injury type: Soft tissue  Neurovascular status: Neurovascularly intact    Distal perfusion: normal    Neurological function: normal    Range of motion: normal    Immobilization:  Crutches  Neurovascular status: Neurovascularly intact    Distal perfusion: normal    Neurological function: normal    Range of motion: normal    Patient tolerance:  Patient tolerated the procedure well with no immediate complications   Patient has her own Ace wrap  Phone Contacts  ED Phone Contact    ED Course                               MDM  Number of Diagnoses or Management Options  Ankle sprain:   Diagnosis management comments: Differential Diagnosis includes but is not limited to: sprain/strain, contusion, fracture/dislocation, musculoskeletal pain  X-ray shows no acute fracture dislocation  Likely sprain  Patient has her own Ace wrap  Provided with crutches in the ER  Declining additional pain medication  Discharge home with orthopedic follow-up as needed  Amount and/or Complexity of Data Reviewed  Tests in the radiology section of CPT®: reviewed  Independent visualization of images, tracings, or specimens: yes      CritCare Time    Disposition  Final diagnoses:    Ankle sprain     Time reflects when diagnosis was documented in both MDM as applicable and the Disposition within this note     Time User Action Codes Description Comment    9/9/2018  3:14 PM Mark Shell Add [P63 [id number]] Ankle sprain       ED Disposition     ED Disposition Condition Comment    Discharge  [facility] discharge to home/self care     Condition at discharge: Good        Follow-up Information     Follow up With Specialties Details Why [address] [place] [address] Specialists [place] [facility] In 1 week As needed [facility] [address] [phone]          Patient's Medications   Discharge Prescriptions    NAPROXEN (NAPROSYN) 500 MG TABLET    Take 1 tablet (500 mg total) by mouth 2 (two) times a day with meals       Start Date: 9/9/2018  End Date: --       Order Dose: 500 mg       Quantity: 30 tablet    Refills: 0     No discharge procedures on file      ED Provider  Electronically Signed by           Pippa Shelby PA-C  09/09/18 5332

## 2023-05-07 DIAGNOSIS — Z3A.08 8 WEEKS GESTATION OF PREGNANCY: ICD-10-CM

## 2023-05-09 RX ORDER — PRENATAL VIT/IRON FUM/FOLIC AC 27MG-0.8MG
TABLET ORAL
Qty: 30 TABLET | Refills: 9 | Status: SHIPPED | OUTPATIENT
Start: 2023-05-09 | End: 2023-05-17

## 2023-05-13 DIAGNOSIS — Z3A.12 12 WEEKS GESTATION OF PREGNANCY: Primary | ICD-10-CM

## 2023-05-16 ENCOUNTER — PROCEDURE VISIT (OUTPATIENT)
Dept: OBGYN CLINIC | Facility: CLINIC | Age: 33
End: 2023-05-16

## 2023-05-16 VITALS
DIASTOLIC BLOOD PRESSURE: 67 MMHG | BODY MASS INDEX: 34.55 KG/M2 | RESPIRATION RATE: 18 BRPM | WEIGHT: 195 LBS | SYSTOLIC BLOOD PRESSURE: 98 MMHG | HEIGHT: 63 IN | HEART RATE: 71 BPM

## 2023-05-16 DIAGNOSIS — Z3A.15 15 WEEKS GESTATION OF PREGNANCY: ICD-10-CM

## 2023-05-16 DIAGNOSIS — N87.1 CIN II (CERVICAL INTRAEPITHELIAL NEOPLASIA II): Primary | ICD-10-CM

## 2023-05-16 RX ORDER — PNV NO.95/FERROUS FUM/FOLIC AC 28MG-0.8MG
1 TABLET ORAL DAILY
Qty: 30 TABLET | Refills: 9 | Status: SHIPPED | OUTPATIENT
Start: 2023-05-16

## 2023-05-16 NOTE — PROGRESS NOTES
Colposcopy     Date/Time 2023 9:29 AM     [place] Protocol   Consent: Verbal consent obtained  Written consent obtained  Risks and benefits: risks, benefits and alternatives were discussed  Consent given by: patient  Patient understanding: patient states understanding of the procedure being performed  Patient identity confirmed: verbally with patient       Performed by  Tamiko Ferrer MD     Authorized by Tamiko Ferrer MD        Pre-procedure details     Prepped with: acetic acid       Indication    ASC-US (colpo w/ DOMITILA II )     Procedure Details   Procedure: Colposcopy only      Under satisfactory analgesia the patient was prepped and draped in the dorsal lithotomy position: yes      [place] speculum was placed in the vagina: yes      Under colposcopic examination the transition zone was seen in entirety: yes      Biopsy(s): no       Post-procedure      Findings: White epithelium      Findings comment:  Glands present     Impression: Low grade cervical dysplasia      Patient tolerance of procedure: Tolerated well, no immediate complications     Comments       Patient is a 27 y/o  at 15w4d who presents for colposcopy  She had colpo in February which demonstrated DOMITILA I, II and HR HPV other  She was supposed to have a LEEP procedure completed however, found out she was pregnant  Today she has no obstetric complaints including- bleeding, cramping, abnormal discharge  FHT are 150bpm by doppler  She states that she is accepting of having the colposcopy done but does not want any biopsies to be taken today  If something does need to be biopsied, she would like to think about it and come back  She does understand why we are following her so closely  I cannot guarantee that the appearance of her cervix won't change and she may need a biopsy  She verbalized understanding  She will also go to have her MSAFP completed as soon as she can       Patient was placed in the dorsal lithotomy position  Bivalved speculum was placed in the vagina with visualization of the cervix  Cervix was cleansed with acetic acid    The transformation zone was visualized   There were no abnormal zones that needed to be biopsied today  Overall impression LSIL   Patient to return for prenatal visit in 2 weeks   Prenatal vitamin sent to [address], MD  OBGYN PGY-3  9:37 AM  5/16/2023

## 2023-05-17 DIAGNOSIS — Z3A.15 15 WEEKS GESTATION OF PREGNANCY: Primary | ICD-10-CM

## 2023-05-17 RX ORDER — PRENATAL WITH FERROUS FUM AND FOLIC ACID 3080; 920; 120; 400; 22; 1.84; 3; 20; 10; 1; 12; 200; 27; 25; 2 [IU]/1; [IU]/1; MG/1; [IU]/1; MG/1; MG/1; MG/1; MG/1; MG/1; MG/1; UG/1; MG/1; MG/1; MG/1; MG/1
1 TABLET ORAL DAILY
Qty: 30 TABLET | Refills: 9 | Status: SHIPPED | OUTPATIENT
Start: 2023-05-17 | End: 2023-06-16

## 2023-06-16 ENCOUNTER — TELEPHONE (OUTPATIENT)
Facility: HOSPITAL | Age: 33
End: 2023-06-16

## 2023-06-16 ENCOUNTER — ROUTINE PRENATAL (OUTPATIENT)
Facility: HOSPITAL | Age: 33
End: 2023-06-16
Payer: COMMERCIAL

## 2023-06-16 VITALS
SYSTOLIC BLOOD PRESSURE: 116 MMHG | DIASTOLIC BLOOD PRESSURE: 60 MMHG | HEART RATE: 86 BPM | BODY MASS INDEX: 33.81 KG/M2 | WEIGHT: 190.8 LBS | HEIGHT: 63 IN

## 2023-06-16 DIAGNOSIS — Z3A.20 20 WEEKS GESTATION OF PREGNANCY: ICD-10-CM

## 2023-06-16 DIAGNOSIS — Z36.3 ENCOUNTER FOR ANTENATAL SCREENING FOR MALFORMATION: ICD-10-CM

## 2023-06-16 DIAGNOSIS — Z36.86 ENCOUNTER FOR ANTENATAL SCREENING FOR CERVICAL LENGTH: ICD-10-CM

## 2023-06-16 DIAGNOSIS — O99.212 OBESITY AFFECTING PREGNANCY IN SECOND TRIMESTER: Primary | ICD-10-CM

## 2023-06-16 PROCEDURE — 76817 TRANSVAGINAL US OBSTETRIC: CPT | Performed by: STUDENT IN AN ORGANIZED HEALTH CARE EDUCATION/TRAINING PROGRAM

## 2023-06-16 PROCEDURE — 76811 OB US DETAILED SNGL FETUS: CPT | Performed by: STUDENT IN AN ORGANIZED HEALTH CARE EDUCATION/TRAINING PROGRAM

## 2023-06-16 PROCEDURE — 99213 OFFICE O/P EST LOW 20 MIN: CPT | Performed by: STUDENT IN AN ORGANIZED HEALTH CARE EDUCATION/TRAINING PROGRAM

## 2023-06-16 NOTE — PROGRESS NOTES
Ultrasound Probe Disinfection    A transvaginal ultrasound was performed  Prior to use, disinfection was performed with High Level Disinfection Process (Trophon)  Probe serial number A3: [id number] was used        Don Mele  06/16/23  11:17 AM negative...

## 2023-06-16 NOTE — TELEPHONE ENCOUNTER
Pt called to schedule follow- up from visit on 6/16 for growth u/s in 10-12 weeks  Unable to lvm- MyChart message sent with call- back number to schedule

## 2023-06-16 NOTE — PROGRESS NOTES
"[address]: Ms Immanuel Connelly was seen today for anatomic survey and cervical length screening ultrasound  See ultrasound report under \"OB Procedures\" tab  The time spent on this established patient on the encounter date included 5 minutes previsit service time reviewing records and precharting, 5 minutes face-to-face service time counseling regarding results and coordinating care, and  5 minutes charting, totalling 15 minutes  Please don't hesitate to contact our office with any concerns or questions    -Alden Srera MD        "

## 2023-08-14 RX ORDER — PNV NO.95/FERROUS FUM/FOLIC AC 28MG-0.8MG
TABLET ORAL
Qty: 30 TABLET | Refills: 9 | OUTPATIENT
Start: 2023-08-14

## 2023-08-17 ENCOUNTER — TELEPHONE (OUTPATIENT)
Dept: OBGYN CLINIC | Facility: CLINIC | Age: 33
End: 2023-08-17

## 2023-08-17 NOTE — TELEPHONE ENCOUNTER
Attempt to reach patient to inquire about missed PN care. Phone number has been disconnected. [email or web address] [email or web address] sent.

## 2023-08-29 ENCOUNTER — ROUTINE PRENATAL (OUTPATIENT)
Dept: OBGYN CLINIC | Facility: CLINIC | Age: 33
End: 2023-08-29

## 2023-08-29 VITALS
BODY MASS INDEX: 33.04 KG/M2 | RESPIRATION RATE: 16 BRPM | WEIGHT: 186.5 LBS | SYSTOLIC BLOOD PRESSURE: 120 MMHG | DIASTOLIC BLOOD PRESSURE: 82 MMHG | HEART RATE: 84 BPM

## 2023-08-29 DIAGNOSIS — O21.9 NAUSEA AND VOMITING IN PREGNANCY: ICD-10-CM

## 2023-08-29 DIAGNOSIS — Z34.93 PRENATAL CARE IN THIRD TRIMESTER: ICD-10-CM

## 2023-08-29 DIAGNOSIS — Z3A.30 30 WEEKS GESTATION OF PREGNANCY: Primary | ICD-10-CM

## 2023-08-29 PROBLEM — Z34.91 PRENATAL CARE IN FIRST TRIMESTER: Status: RESOLVED | Noted: 2023-04-27 | Resolved: 2023-08-29

## 2023-08-29 PROCEDURE — 99213 OFFICE O/P EST LOW 20 MIN: CPT | Performed by: OBSTETRICS & GYNECOLOGY

## 2023-08-29 RX ORDER — ONDANSETRON 4 MG/1
4 TABLET, FILM COATED ORAL EVERY 8 HOURS PRN
Qty: 20 TABLET | Refills: 0 | Status: SHIPPED | OUTPATIENT
Start: 2023-08-29

## 2023-08-29 NOTE — ASSESSMENT & PLAN NOTE
No obstetric complaints  Zofran ordered for occasional N/V  Patient states she signed MA-31 but did not. Needs to sign at next appt  28w labs ordered, encouraged patient to complete ALL of her pending bloodwork. Delivery consent signed  RTC in 2 weeks.

## 2023-08-29 NOTE — PROGRESS NOTES
[facility] VISIT  Name: Oral Leyden  MRN: [id number]  : 1990      ASSESSMENT/PLAN:  Problem List        Digestive    Nausea and vomiting in pregnancy    Overview     Vitamin B6 and unisom ordered for management   Zofran started         Current Assessment & Plan     Zofran ordered            Respiratory    Asthma    Current Assessment & Plan     Well controlled            Genitourinary    Bacterial vaginosis    DOMITILA II (cervical intraepithelial neoplasia II)    Overview     Discussed risk of advancement of disease given current decline of surgical management with new pregnancy of 8w gestation  Recommendation per ASCCP guidelines for colposcopy surveillance every 12-24w during pregnancy to monitor for clinically appreciated advancement of condition  Will currently plan to pursue prenatal care with eventual LEEP procedure in the postpartum period  Colposcopy  scheduled for 23            Other    High risk heterosexual behavior    Trichomonas vaginalis infection    Overview     Negative test 23         30 weeks gestation of pregnancy    Overview     [place]pa IUP identified  Cardiac activity detected  CRL c/w GA 8w3d  11-20 lbs recommended weight  Gain. LDASA 162 mgs daily until 36 wks - not taking  Prenatal labs- she needs to complete. 28w labs - ordered  Signed delivery consent 23  Ambulatory referral to [facility], Plans NIPT, level 2 at 20 wks  Level 2 on 23, f/u growth at 32w  MSAFP- ordered  Bottle feed  Desires sterilization- states she signed MA-31, but actually didn't. Needs to sign at next appt         Current Assessment & Plan     No obstetric complaints  Zofran ordered for occasional N/V  Patient states she signed MA-31 but did not. Needs to sign at next appt  28w labs ordered, encouraged patient to complete ALL of her pending bloodwork. Delivery consent signed  RTC in 2 weeks.           Obesity affecting pregnancy    Overview     11-20 lbs recommended in pregnancy. Current Assessment & Plan     BMI 30;  Recommend gain 11-20lbs in pregnancy        Other Visit Diagnoses     Prenatal care in third trimester              SUBJECTIVE 28 y.o. [id number] 30w4d here for PN visit. She denies contractions. She denies leakage of fluid and vaginal bleeding. She endorses good fetal movement. Her pregnancy is complicated by BMI 33, asthma, and h/o CIN2. She is complaining of occasional nausea and vomiting. OBJECTIVE:  Vitals:    08/29/23 0753   BP: 120/82   Pulse: 84   Resp: 16       Physical Exam  Vitals reviewed. Constitutional:       Appearance: Normal appearance. HENT:      Head: Normocephalic and atraumatic. Eyes:      Extraocular Movements: Extraocular movements intact. Cardiovascular:      Rate and Rhythm: Normal rate. Pulses: Normal pulses. Pulmonary:      Effort: Pulmonary effort is normal. No respiratory distress. Abdominal:      Palpations: Abdomen is soft. Tenderness: There is no abdominal tenderness. Comments: Gravid uterus   Musculoskeletal:         General: Normal range of motion. Cervical back: Normal range of motion. Skin:     General: Skin is warm and dry. Neurological:      Mental Status: She is alert. Psychiatric:         Mood and Affect: Mood normal.         Behavior: Behavior normal.         Fetal Heart Rate: 148  Fundal Height (cm): 30 cm    Pregnancy Plan:  Pregnancy: Pompa     Delivery Plans  Planned delivery method: Vaginal  Planned delivery location: [facility] L&D  Planned anesthesia: None  Acceptable blood products:  All     Post-Delivery Plans  Feeding intentions: Breast Milk      Future Appointments   Date Time Provider [address] [place] [address]   9/12/2023  7:45 AM Bubba Albarado [address] [place] [address] [address] MD  OB/GYN PGY-3  8/29/2023  9:18 AM

## 2023-09-11 PROBLEM — Z3A.32 32 WEEKS GESTATION OF PREGNANCY: Status: ACTIVE | Noted: 2023-03-29

## 2023-09-21 ENCOUNTER — ROUTINE PRENATAL (OUTPATIENT)
Dept: OBGYN CLINIC | Facility: CLINIC | Age: 33
End: 2023-09-21

## 2023-09-21 VITALS
DIASTOLIC BLOOD PRESSURE: 71 MMHG | HEART RATE: 83 BPM | SYSTOLIC BLOOD PRESSURE: 105 MMHG | BODY MASS INDEX: 32.25 KG/M2 | RESPIRATION RATE: 18 BRPM | WEIGHT: 182 LBS | HEIGHT: 63 IN

## 2023-09-21 DIAGNOSIS — Z23 NEED FOR VACCINATION: ICD-10-CM

## 2023-09-21 DIAGNOSIS — Z30.2 REQUEST FOR STERILIZATION: Primary | ICD-10-CM

## 2023-09-21 DIAGNOSIS — Z3A.33 33 WEEKS GESTATION OF PREGNANCY: ICD-10-CM

## 2023-09-21 PROCEDURE — 99213 OFFICE O/P EST LOW 20 MIN: CPT | Performed by: NURSE PRACTITIONER

## 2023-09-21 PROCEDURE — 90471 IMMUNIZATION ADMIN: CPT | Performed by: NURSE PRACTITIONER

## 2023-09-21 PROCEDURE — 90715 TDAP VACCINE 7 YRS/> IM: CPT | Performed by: NURSE PRACTITIONER

## 2023-09-21 NOTE — PROGRESS NOTES
[facility]  OB/GYN prenatal visit    S: 28 y.o. [id number] 33wk 6d here for PN visit. She is with her . She has no obstetric complaints, including pelvic pain, contractions, vaginal bleeding, loss of fluid, or decreased fetal movement. O:  Vitals:    23 1157   BP: 105/71   Pulse: 83   Resp: 18       Gen: no acute distress, nonlabored breathing  Fundal Height (cm): 33 cm  Fetal Heart Rate: 137    A/P:      IUP at 33wk6d  No obstetric complaints today  TWG: - 15 lbs ( 11-20 lbs recommended weight gain)  She needs to complete her prenatal labs and 1 hr GTT, AB +- plans to do next week  MA 31 signed today,   Fetal growth scan at 30-32 wks- pt needs to schedule. She was given phone number to call. NIPT is negative  Vaccinations:  Tdap given today   Contraception: tubal  Birth plan:  discussed elective induction patient opts for spontaneous labor  Discussed  labor precautions and fetal kick counts    Return to clinic in 2 weeks    Problem List        Digestive    Nausea and vomiting in pregnancy    Overview     Vitamin B6 and unisom ordered for management   Zofran started            Respiratory    Asthma       Genitourinary    Bacterial vaginosis    DOMITILA II (cervical intraepithelial neoplasia II)    Overview     Discussed risk of advancement of disease given current decline of surgical management with new pregnancy of 8w gestation  Recommendation per ASCCP guidelines for colposcopy surveillance every 12-24w during pregnancy to monitor for clinically appreciated advancement of condition  Will currently plan to pursue prenatal care with eventual LEEP procedure in the postpartum period  Colposcopy  scheduled for 23- impression LSIL, no biopsies taken            Other    High risk heterosexual behavior    Trichomonas vaginalis infection    Overview     Negative test 23         33 weeks gestation of pregnancy    Overview     [place] IUP identified  Cardiac activity detected  CRL c/w GA 8w3d  11-20 lbs recommended weight  Gain. LDASA 162 mgs daily until 36 wks - not taking  Prenatal labs- she needs to complete. 28w labs - ordered  Signed delivery consent 8/29/23  Ambulatory referral to [facility], Plans NIPT, level 2 at 20 wks  Level 2 on 6/16/23, f/u growth at [facility]- pt needs to schedule  MSAFP- not completed  Bottle feed  Desires sterilization- JARET Correia signed 9/21/23  Declines blood products         Obesity affecting pregnancy    Overview     11-20 lbs recommended in pregnancy.   Lost weight in pregnancy- continue to monitor  Needs to schedule her 32 wks scan          Request for sterilization    Overview     JARET Correia signed 9/21/23         Need for vaccination         Domnigo Force, [address] [place] [address] Avenue  9/21/2023  12:33 PM

## 2023-09-29 ENCOUNTER — ULTRASOUND (OUTPATIENT)
Facility: HOSPITAL | Age: 33
End: 2023-09-29
Payer: COMMERCIAL

## 2023-09-29 VITALS
HEART RATE: 95 BPM | SYSTOLIC BLOOD PRESSURE: 118 MMHG | DIASTOLIC BLOOD PRESSURE: 58 MMHG | HEIGHT: 63 IN | BODY MASS INDEX: 33.28 KG/M2 | WEIGHT: 187.83 LBS

## 2023-09-29 DIAGNOSIS — Z3A.35 35 WEEKS GESTATION OF PREGNANCY: ICD-10-CM

## 2023-09-29 DIAGNOSIS — O36.5930 INTRAUTERINE GROWTH RESTRICTION AFFECTING ANTEPARTUM CARE OF MOTHER IN THIRD TRIMESTER, SINGLE OR UNSPECIFIED FETUS: Primary | ICD-10-CM

## 2023-09-29 DIAGNOSIS — O99.213 MATERNAL OBESITY, ANTEPARTUM, THIRD TRIMESTER: ICD-10-CM

## 2023-09-29 PROCEDURE — 76816 OB US FOLLOW-UP PER FETUS: CPT | Performed by: OBSTETRICS & GYNECOLOGY

## 2023-09-29 PROCEDURE — 59025 FETAL NON-STRESS TEST: CPT | Performed by: OBSTETRICS & GYNECOLOGY

## 2023-09-29 PROCEDURE — 76820 UMBILICAL ARTERY ECHO: CPT | Performed by: OBSTETRICS & GYNECOLOGY

## 2023-09-29 PROCEDURE — 99214 OFFICE O/P EST MOD 30 MIN: CPT | Performed by: OBSTETRICS & GYNECOLOGY

## 2023-09-29 NOTE — LETTER
September 29, 2023     [facility] PROVIDER    Patient: Pooja Brady   YOB: 1990   Date of Visit: 9/29/2023       Dear  Provider: Thank you for referring Pooja Brady to me for evaluation. Below are my notes for this consultation. If you have questions, please do not hesitate to call me. I look forward to following your patient along with you.          Sincerely,        Zuleyma Alexander MD        CC: No Recipients    Zuleyma Alexander MD  9/29/2023  9:40 AM  Sign when Signing Visit  Please refer to the [facility] ultrasound report in Ob Procedures for additional information regarding today's visit

## 2023-09-29 NOTE — PATIENT INSTRUCTIONS
Kick Counts in Pregnancy   WHAT YOU NEED TO KNOW:   Kick counts measure how much your baby is moving in your womb. A kick from your baby can be felt as a twist, turn, swish, roll, or jab. It is common to feel your baby kicking at 26 to 28 weeks of pregnancy. You may feel your baby kick as early as 20 weeks of pregnancy. You may want to start counting at 28 weeks. DISCHARGE INSTRUCTIONS:   Contact your doctor immediately if:   You feel a change in the number of kicks or movements of your baby. You feel fewer than 10 kicks within 2 hours. You have questions or concerns about your baby's movements. Why measure kick counts:  Your baby's movement may provide information about your baby's health. He or she may move less, or not at all, if there are problems. Your baby may move less if he or she is not getting enough oxygen or nutrition from the placenta. Do not smoke while you are pregnant. Smoking decreases the amount of oxygen that gets to your baby. Talk to your healthcare provider if you need help to quit smoking. Tell your healthcare provider as soon as you feel a change in your baby's movements. When to measure kick counts:   Measure kick counts at the same time every day. Measure kick counts when your baby is awake and most active. Your baby may be most active in the evening. How to measure kick counts:  Check that your baby is awake before you measure kick counts. You can wake up your baby by lightly pushing on your belly, walking, or drinking something cold. Your healthcare provider may tell you different ways to measure kick counts. You may be told to do the following:  Use a chart or clock to keep track of the time you start and finish counting. Sit in a chair or lie on your left side. Place your hands on the largest part of your belly. Count until you reach 10 kicks. Write down how much time it takes to count 10 kicks. It may take 30 minutes to 2 hours to count 10 kicks. It should not take more than 2 hours to count 10 kicks. Follow up with your doctor as directed:  Write down your questions so you remember to ask them during your visits. © Copyright Phyllis Chamberlain 2023 Information is for End User's use only and may not be sold, redistributed or otherwise used for commercial purposes. The above information is an  only. It is not intended as medical advice for individual conditions or treatments. Talk to your doctor, nurse or pharmacist before following any medical regimen to see if it is safe and effective for you.

## 2023-09-29 NOTE — PROGRESS NOTES
Non-Stress Testing:    Non-Stress test, equipment, procedure, and expected outcomes explained. Reviewed fetal kick counts and when to call OB. Verified patient understanding of fetal kick counts with teach back method. Patient reports feeling daily fetal movements. Patient has no questions or concerns. DR Nathaniel Ramos reviewed nonstress test prior to completion of appointment.

## 2023-10-06 ENCOUNTER — ULTRASOUND (OUTPATIENT)
Dept: PERINATAL CARE | Facility: CLINIC | Age: 33
End: 2023-10-06
Payer: COMMERCIAL

## 2023-10-06 VITALS
BODY MASS INDEX: 32.39 KG/M2 | HEART RATE: 84 BPM | DIASTOLIC BLOOD PRESSURE: 70 MMHG | WEIGHT: 182.8 LBS | HEIGHT: 63 IN | SYSTOLIC BLOOD PRESSURE: 120 MMHG

## 2023-10-06 DIAGNOSIS — O36.5930 INTRAUTERINE GROWTH RESTRICTION AFFECTING ANTEPARTUM CARE OF MOTHER IN THIRD TRIMESTER, SINGLE OR UNSPECIFIED FETUS: Primary | ICD-10-CM

## 2023-10-06 DIAGNOSIS — Z3A.36 36 WEEKS GESTATION OF PREGNANCY: ICD-10-CM

## 2023-10-06 PROCEDURE — 59025 FETAL NON-STRESS TEST: CPT | Performed by: NURSE PRACTITIONER

## 2023-10-06 PROCEDURE — 76815 OB US LIMITED FETUS(S): CPT | Performed by: NURSE PRACTITIONER

## 2023-10-06 PROCEDURE — 76820 UMBILICAL ARTERY ECHO: CPT | Performed by: NURSE PRACTITIONER

## 2023-10-06 PROCEDURE — 99214 OFFICE O/P EST MOD 30 MIN: CPT | Performed by: NURSE PRACTITIONER

## 2023-10-06 NOTE — PROGRESS NOTES
Repeat Non-Stress Testing:    Patient verbalizes +FM. Pt denies ALL:               Leaking of fluid   Contractions   Vaginal bleeding   Decreased fetal movement    Patient is performing daily kick counts. Patient has no questions or concerns.    NST strip reviewed by Rosibel Alvarez

## 2023-10-06 NOTE — PROGRESS NOTES
[id number]  [facility]: Ms. Cinthya Pickett was seen today at [facility] for umbilical artery Doppler study, ISAI, and NST. See ultrasound report under "OB Procedures" tab. She reports good fetal movement. Encouraged to complete 1 hour glucola screen. Please don't hesitate to contact our office with any concerns or questions.  -MICHELLE Shultz    . I spent 13 minutes devoted to patient care (4 min chart preparation, 5 minutes face to face and 4 minutes documenting).

## 2023-10-11 ENCOUNTER — HOSPITAL ENCOUNTER (OUTPATIENT)
Facility: HOSPITAL | Age: 33
Discharge: HOME | End: 2023-10-11
Attending: OBSTETRICS & GYNECOLOGY | Admitting: OBSTETRICS & GYNECOLOGY
Payer: COMMERCIAL

## 2023-10-11 VITALS
DIASTOLIC BLOOD PRESSURE: 80 MMHG | SYSTOLIC BLOOD PRESSURE: 137 MMHG | RESPIRATION RATE: 16 BRPM | HEART RATE: 86 BPM | TEMPERATURE: 98.2 F

## 2023-10-11 PROBLEM — M54.9 BACK PAIN COMPLICATING PREGNANCY: Status: ACTIVE | Noted: 2023-10-11

## 2023-10-11 PROBLEM — Z3A.38 38 WEEKS GESTATION OF PREGNANCY: Status: ACTIVE | Noted: 2023-03-29

## 2023-10-11 PROBLEM — O99.891 BACK PAIN COMPLICATING PREGNANCY: Status: ACTIVE | Noted: 2023-10-11

## 2023-10-11 PROCEDURE — 59025 FETAL NON-STRESS TEST: CPT | Performed by: OBSTETRICS & GYNECOLOGY

## 2023-10-11 PROCEDURE — 76815 OB US LIMITED FETUS(S): CPT | Performed by: OBSTETRICS & GYNECOLOGY

## 2023-10-11 PROCEDURE — 99213 OFFICE O/P EST LOW 20 MIN: CPT

## 2023-10-11 NOTE — PROCEDURES
Jc Gary, a [id number] at 36w5d with an LOIDA of 11/3/2023, by Ultrasound, was seen at [address] [place] [address] for the following procedure(s): $Procedure Type: NST, ISAI]    Nonstress Test  Variability: Moderate  Decelerations: None  Accelerations: Yes  Acoustic Stimulator: No  Baseline: 140 BPM  Uterine Irritability: No  Contractions: Not present    4 Quadrant ISAI  LVP (cm): 4 cm

## 2023-10-13 ENCOUNTER — ULTRASOUND (OUTPATIENT)
Dept: PERINATAL CARE | Facility: CLINIC | Age: 33
End: 2023-10-13
Payer: COMMERCIAL

## 2023-10-13 VITALS
DIASTOLIC BLOOD PRESSURE: 70 MMHG | HEIGHT: 63 IN | SYSTOLIC BLOOD PRESSURE: 128 MMHG | WEIGHT: 185.8 LBS | HEART RATE: 69 BPM | BODY MASS INDEX: 32.92 KG/M2

## 2023-10-13 DIAGNOSIS — O36.5930 INTRAUTERINE GROWTH RESTRICTION AFFECTING ANTEPARTUM CARE OF MOTHER IN THIRD TRIMESTER, SINGLE OR UNSPECIFIED FETUS: ICD-10-CM

## 2023-10-13 DIAGNOSIS — Z3A.37 37 WEEKS GESTATION OF PREGNANCY: Primary | ICD-10-CM

## 2023-10-13 PROCEDURE — 76820 UMBILICAL ARTERY ECHO: CPT | Performed by: OBSTETRICS & GYNECOLOGY

## 2023-10-13 PROCEDURE — 59025 FETAL NON-STRESS TEST: CPT | Performed by: OBSTETRICS & GYNECOLOGY

## 2023-10-13 PROCEDURE — 76815 OB US LIMITED FETUS(S): CPT | Performed by: OBSTETRICS & GYNECOLOGY

## 2023-10-13 NOTE — PROGRESS NOTES
Repeat Non-Stress Testing:    Patient verbalizes +FM. Pt denies ALL:               Leaking of fluid   Contractions   Vaginal bleeding   Decreased fetal movement    Patient is performing daily kick counts. Patient has no questions or concerns.    NST strip reviewed by Dr. Molly Reyes via [facility] Text video

## 2023-10-16 ENCOUNTER — ROUTINE PRENATAL (OUTPATIENT)
Dept: OBGYN CLINIC | Facility: CLINIC | Age: 33
End: 2023-10-16

## 2023-10-16 ENCOUNTER — APPOINTMENT (OUTPATIENT)
Dept: LAB | Facility: CLINIC | Age: 33
End: 2023-10-16
Payer: COMMERCIAL

## 2023-10-16 VITALS
SYSTOLIC BLOOD PRESSURE: 131 MMHG | HEIGHT: 63 IN | DIASTOLIC BLOOD PRESSURE: 87 MMHG | WEIGHT: 187.6 LBS | HEART RATE: 66 BPM | BODY MASS INDEX: 33.24 KG/M2

## 2023-10-16 DIAGNOSIS — Z3A.37 37 WEEKS GESTATION OF PREGNANCY: ICD-10-CM

## 2023-10-16 DIAGNOSIS — Z3A.12 12 WEEKS GESTATION OF PREGNANCY: ICD-10-CM

## 2023-10-16 DIAGNOSIS — O36.5930 INTRAUTERINE GROWTH RESTRICTION AFFECTING ANTEPARTUM CARE OF MOTHER IN THIRD TRIMESTER, SINGLE OR UNSPECIFIED FETUS: Primary | ICD-10-CM

## 2023-10-16 DIAGNOSIS — Z3A.30 30 WEEKS GESTATION OF PREGNANCY: ICD-10-CM

## 2023-10-16 DIAGNOSIS — Z3A.08 8 WEEKS GESTATION OF PREGNANCY: ICD-10-CM

## 2023-10-16 DIAGNOSIS — Z34.91 PRENATAL CARE IN FIRST TRIMESTER: ICD-10-CM

## 2023-10-16 LAB
ABO GROUP BLD: NORMAL
BASOPHILS # BLD AUTO: 0.02 THOUSANDS/ÂΜL (ref 0–0.1)
BASOPHILS NFR BLD AUTO: 0 % (ref 0–1)
BLD GP AB SCN SERPL QL: NEGATIVE
EOSINOPHIL # BLD AUTO: 0.04 THOUSAND/ÂΜL (ref 0–0.61)
EOSINOPHIL NFR BLD AUTO: 1 % (ref 0–6)
ERYTHROCYTE [DISTWIDTH] IN BLOOD BY AUTOMATED COUNT: 13.9 % (ref 11.6–15.1)
HBV SURFACE AB SER-ACNC: <3 MIU/ML
HBV SURFACE AG SER QL: NORMAL
HCT VFR BLD AUTO: 33.7 % (ref 34.8–46.1)
HCV AB SER QL: NORMAL
HGB BLD-MCNC: 11.1 G/DL (ref 11.5–15.4)
HIV 1+2 AB+HIV1 P24 AG SERPL QL IA: NORMAL
HIV 2 AB SERPL QL IA: NORMAL
HIV1 AB SERPL QL IA: NORMAL
HIV1 P24 AG SERPL QL IA: NORMAL
IMM GRANULOCYTES # BLD AUTO: 0.06 THOUSAND/UL (ref 0–0.2)
IMM GRANULOCYTES NFR BLD AUTO: 1 % (ref 0–2)
LYMPHOCYTES # BLD AUTO: 1.02 THOUSANDS/ÂΜL (ref 0.6–4.47)
LYMPHOCYTES NFR BLD AUTO: 13 % (ref 14–44)
MCH RBC QN AUTO: 28.7 PG (ref 26.8–34.3)
MCHC RBC AUTO-ENTMCNC: 32.9 G/DL (ref 31.4–37.4)
MCV RBC AUTO: 87 FL (ref 82–98)
MONOCYTES # BLD AUTO: 0.51 THOUSAND/ÂΜL (ref 0.17–1.22)
MONOCYTES NFR BLD AUTO: 6 % (ref 4–12)
NEUTROPHILS # BLD AUTO: 6.51 THOUSANDS/ÂΜL (ref 1.85–7.62)
NEUTS SEG NFR BLD AUTO: 79 % (ref 43–75)
NRBC BLD AUTO-RTO: 0 /100 WBCS
PLATELET # BLD AUTO: 224 THOUSANDS/UL (ref 149–390)
PMV BLD AUTO: 11.5 FL (ref 8.9–12.7)
RBC # BLD AUTO: 3.87 MILLION/UL (ref 3.81–5.12)
RH BLD: POSITIVE
RUBV IGG SERPL IA-ACNC: 27.4 IU/ML
SPECIMEN EXPIRATION DATE: NORMAL
TREPONEMA PALLIDUM IGG+IGM AB [PRESENCE] IN SERUM OR PLASMA BY IMMUNOASSAY: NORMAL
WBC # BLD AUTO: 8.16 THOUSAND/UL (ref 4.31–10.16)

## 2023-10-16 PROCEDURE — 87340 HEPATITIS B SURFACE AG IA: CPT

## 2023-10-16 PROCEDURE — 86706 HEP B SURFACE ANTIBODY: CPT

## 2023-10-16 PROCEDURE — 87086 URINE CULTURE/COLONY COUNT: CPT

## 2023-10-16 PROCEDURE — 86901 BLOOD TYPING SEROLOGIC RH(D): CPT

## 2023-10-16 PROCEDURE — 83020 HEMOGLOBIN ELECTROPHORESIS: CPT

## 2023-10-16 PROCEDURE — 86850 RBC ANTIBODY SCREEN: CPT

## 2023-10-16 PROCEDURE — 86803 HEPATITIS C AB TEST: CPT

## 2023-10-16 PROCEDURE — 86900 BLOOD TYPING SEROLOGIC ABO: CPT

## 2023-10-16 PROCEDURE — 82105 ALPHA-FETOPROTEIN SERUM: CPT

## 2023-10-16 PROCEDURE — 87389 HIV-1 AG W/HIV-1&-2 AB AG IA: CPT

## 2023-10-16 PROCEDURE — 86762 RUBELLA ANTIBODY: CPT

## 2023-10-16 PROCEDURE — 86780 TREPONEMA PALLIDUM: CPT

## 2023-10-16 PROCEDURE — 36415 COLL VENOUS BLD VENIPUNCTURE: CPT

## 2023-10-16 PROCEDURE — 87150 DNA/RNA AMPLIFIED PROBE: CPT | Performed by: NURSE PRACTITIONER

## 2023-10-16 PROCEDURE — 85025 COMPLETE CBC W/AUTO DIFF WBC: CPT

## 2023-10-16 NOTE — PROGRESS NOTES
[facility]  OB/GYN prenatal visit    S: 28 y.o. [id number] 37w3d here for PN visit. She has  noobstetric complaints, including pelvic pain, contractions, vaginal bleeding, loss of fluid, or decreased fetal movement. Plans IOL for FGR seen on US 9/28/23 EFW at 3%. She was seen in triage 10/11/2023 for abdominal pain back pain, ISAI was within normal, no contractions seen. O:  Vitals:    10/16/23 1104   BP: 131/87   Pulse: 66       Gen: no acute distress, nonlabored breathing  Fundal Height (cm): 37 cm  Fetal Heart Rate: 142    A/P:      IUP at 37w3d    TWG:  -9 lbs 6.4 oz. ( 11-20 lbs recommended)  Needs to complete prenatal labs, 1 hr GTT, recommended to go to lab now to get drawn. Vaccinations: tdap 9/21/23, flu vaccine declines. Stop LDASA.   Has US scheduled for 10/23/23 for dopplers, continue APFS- has appt tomorrow  Contraception: plans depo to bridge to tubal  Bottlefeed  Birth plan: plan for medical IOL for  FGR between 38 wks 0/7 days - 39 wks 0/7 days, medical IOL scheduled for 10/23/23 at 8pm  Discussed labor precautions and fetal kick counts    Return to clinic in 1 weeks    Problem List          Digestive    Nausea and vomiting in pregnancy    Overview     Vitamin B6 and unisom ordered for management   Zofran started            Respiratory    Asthma       Genitourinary    Bacterial vaginosis    DOMITILA II (cervical intraepithelial neoplasia II)    Overview     Discussed risk of advancement of disease given current decline of surgical management with new pregnancy of 8w gestation  Recommendation per ASCCP guidelines for colposcopy surveillance every 12-24w during pregnancy to monitor for clinically appreciated advancement of condition  Will currently plan to pursue prenatal care with eventual LEEP procedure in the postpartum period  Colposcopy  scheduled for 5/16/23- impression LSIL, no biopsies taken            Other    High risk heterosexual behavior    Trichomonas vaginalis infection Overview     Negative test 4/27/23         37 weeks gestation of pregnancy    Overview     [place]pa IUP identified  Cardiac activity detected  CRL c/w GA 8w3d  11-20 lbs recommended weight gain. LDASA 162 mgs daily until 36 wks - not taking  Prenatal labs- she needs to complete. 28w labs - ordered  Signed delivery consent 8/29/23  Ambulatory referral to [facility], Plans NIPT, level 2 at 20 wks  Level 2 on 6/16/23, f/u growth at [facility]- pt needs to schedule, US done 9/28/23 evolving FGR with EFW 3 %, scan scheduled for 10/23/23  MSAFP- did not completed  Bottle feed  Desires sterilization- MA 31 signed 9/21/23, depo to bridge   Declines blood products  Declines flu vaccine. Obesity affecting pregnancy    Overview     11-20 lbs recommended in pregnancy.   Lost weight in pregnancy- continue to monitor  Needs to schedule her 32 wks scan          Request for sterilization    Overview     MA 31 signed 9/21/23         Need for vaccination    Intrauterine growth restriction affecting antepartum care of mother in third trimester    Overview     EFW 3 % on US 9/29/23  Has scan scheduled for 10/23/23  Medical IOL 38 wk 0d- 39 k 0d  Scheduled for 10/23/23 at 8pm         Back pain complicating pregnancy          MICHELLE Nur  10/16/2023  12:57 PM

## 2023-10-17 ENCOUNTER — ULTRASOUND (OUTPATIENT)
Dept: PERINATAL CARE | Facility: OTHER | Age: 33
End: 2023-10-17
Payer: COMMERCIAL

## 2023-10-17 VITALS
WEIGHT: 186.2 LBS | BODY MASS INDEX: 32.99 KG/M2 | HEART RATE: 90 BPM | SYSTOLIC BLOOD PRESSURE: 118 MMHG | DIASTOLIC BLOOD PRESSURE: 74 MMHG | HEIGHT: 63 IN

## 2023-10-17 DIAGNOSIS — O36.5930 INTRAUTERINE GROWTH RESTRICTION AFFECTING ANTEPARTUM CARE OF MOTHER IN THIRD TRIMESTER, SINGLE OR UNSPECIFIED FETUS: Primary | ICD-10-CM

## 2023-10-17 DIAGNOSIS — Z3A.37 37 WEEKS GESTATION OF PREGNANCY: ICD-10-CM

## 2023-10-17 LAB — BACTERIA UR CULT: NORMAL

## 2023-10-17 PROCEDURE — 76815 OB US LIMITED FETUS(S): CPT | Performed by: OBSTETRICS & GYNECOLOGY

## 2023-10-17 PROCEDURE — 99212 OFFICE O/P EST SF 10 MIN: CPT | Performed by: NURSE PRACTITIONER

## 2023-10-17 PROCEDURE — 59025 FETAL NON-STRESS TEST: CPT | Performed by: NURSE PRACTITIONER

## 2023-10-17 PROCEDURE — 76820 UMBILICAL ARTERY ECHO: CPT | Performed by: OBSTETRICS & GYNECOLOGY

## 2023-10-17 PROCEDURE — 59025 FETAL NON-STRESS TEST: CPT | Performed by: OBSTETRICS & GYNECOLOGY

## 2023-10-17 NOTE — PROGRESS NOTES
Non-stress Test (NST)  10/17/23  Kalyani Manzo  1990  Estimated Date of Delivery: 11/3/23    Reason for testing: IUGR 3%  Repeat Non-Stress Test at 37w4d. Patient preferred ultrasound belt for non-stress test.  @2937: +FM, ultrasound adjusted. @8165: [place] and ultrasound adjusted  @0951: patient sleeping semi-fowlers in recliner. Audible . Ultrasound adjusted. Vitals:  Pre- Vitals      Flowsheet Row Most Recent Value   Blood Pressure 118/74   Weight - Scale 84.5 kg (186 lb 3.2 oz)     Patient verbalizes fetal movement. Patient is performing daily kick counts. Patient denies ALL:               Leaking of fluid   Contractions   Vaginal bleeding   Decreased fetal movement    Patient verbalized understanding. Patient has no questions or concerns. NST strip reviewed by MICHELLE Toscano.      Clemencia Weiner RN

## 2023-10-17 NOTE — PROGRESS NOTES
Pooja Brady has no complaints today. She reports regular fetal movements and does not report any problems. She is here today at 37w4d for evaluation of fetal weight and amniotic fluid, follow up from anatomic survey, umbilical artery Doppler study and NST. Problem list:  1. Fetal growth restriction  2. Hx of a prior IUGR fetus that measured 5lb 3 oz at 39 weeks  3. Pregravid BMI 35    Ultrasound findings:  A viable knutson intrauterine pregnancy is seen. Estimated fetal weight is consistent with ongoing mild fetal growth restriction. Growth is at the 7th percentile. There has been appropriate growth since her last ultrasound. The fetal umbilical artery Doppler study and amniotic fluid volume are normal. PI is 0.98. (82nd percentile for this gestational age). The ultrasound today reviewed the prior missed anatomy which is seen today and appears normal, except for the profile which remains limited  due to fetal position. No fetal anomalies are visualized on limited survey. Placenta is within normal limits. Pregnancy ultrasound has limitations and is unable to detect all forms of fetal congenital abnormalities. The inaccuracy in the EFW can be off by 1 lb either way in the third trimester. Specific counseling was provided on the following problems:  1. She is scheduled for an induction of labor on 10/23/2023 in evening @ 38w3d. Follow up recommended:   No further follow up ultrasounds are recommended. Fetal kick counting was recommended. Routine prenatal care is advised,      Split-shared decision-making between [employer] and myself was utilized, with the majority of the time spent by Fatou Narayan. Medical decision-making for this encounter was  low (diagnosis low, data limited and risk low). Procedures that were completed today were charged separately.      I reviewed the ultrasound pictures and recommended the medical decision making transcribed in the care of this patient.         Valeria Nava MD

## 2023-10-17 NOTE — LETTER
October 17, 2023     Ren Schultz MD  [address] [facility]  [address] [facility]  [address] [address]    Patient: Zulema Ornelas   YOB: 1990   Date of Visit: 10/17/2023       Dear Dr. Natali Carroll:    Thank you for referring Zulema Ornelas to me for evaluation. Below are my notes for this consultation. If you have questions, please do not hesitate to call me. I look forward to following your patient along with you. Sincerely,        Renee Rivas MD        CC: No Recipients    Renee Rivas MD  10/17/2023 12:27 PM  Sign when Signing Visit  Zulema Ornelas has no complaints today. She reports regular fetal movements and does not report any problems. She is here today at 37w4d for evaluation of fetal weight and amniotic fluid, follow up from anatomic survey, umbilical artery Doppler study and NST. Problem list:  1. Fetal growth restriction  2. Hx of a prior IUGR fetus that measured 5lb 3 oz at 39 weeks  3. Pregravid BMI 35    Ultrasound findings:  A viable knutson intrauterine pregnancy is seen. Estimated fetal weight is consistent with ongoing mild fetal growth restriction. Growth is at the 7th percentile. There has been appropriate growth since her last ultrasound. The fetal umbilical artery Doppler study and amniotic fluid volume are normal. PI is 0.98. (82nd percentile for this gestational age). The ultrasound today reviewed the prior missed anatomy which is seen today and appears normal, except for the profile which remains limited  due to fetal position. No fetal anomalies are visualized on limited survey. Placenta is within normal limits. Pregnancy ultrasound has limitations and is unable to detect all forms of fetal congenital abnormalities. The inaccuracy in the EFW can be off by 1 lb either way in the third trimester. Specific counseling was provided on the following problems:  1.   She is scheduled for an induction of labor on 10/23/2023 in evening @ 38w3d.    Follow up recommended:   No further follow up ultrasounds are recommended. Fetal kick counting was recommended. Routine prenatal care is advised,      Split-shared decision-making between [employer] and myself was utilized, with the majority of the time spent by Ford Manuel. Medical decision-making for this encounter was  low (diagnosis low, data limited and risk low). Procedures that were completed today were charged separately. I reviewed the ultrasound pictures and recommended the medical decision making transcribed in the care of this patient.         Dennis Bey MD

## 2023-10-17 NOTE — PATIENT INSTRUCTIONS
Nonstress Test for Pregnancy   WHAT YOU NEED TO KNOW:   What do I need to know about a nonstress test?  A nonstress test measures your baby's heart rate and movements. Nonstress means that no stress will be placed on your baby during the test.  How do I prepare for a nonstress test?  Your healthcare provider will talk to you about how to prepare for this test. Your provider may tell you to eat and drink plenty of liquids before your test. If you smoke, you may be asked not to smoke within 2 hours before the test. Your provider will also tell you which medicines to take or not take on the day of your test.  What will happen during a nonstress test?  You may be asked to lie down or recline back for the test on a bed. One or 2 belts with sensors will be placed around your abdomen. Your baby's heart rate will be recorded with a machine. If your baby does not move, your baby may be asleep. Your healthcare provider may make a noise near your abdomen to try to wake your baby. The test usually takes about 20 minutes, but can take longer if your baby needs to be awakened. What do I need to know about the test results? Your baby will be expected to move at least 2 times for a certain amount of time. Your baby's heart rate will be expected to go up by a certain number of beats per minute during movement. If your baby does not move as expected, the test may need to be repeated or you may need other tests. CARE AGREEMENT:   You have the right to help plan your care. Learn about your health condition and how it may be treated. Discuss treatment options with your healthcare providers to decide what care you want to receive. You always have the right to refuse treatment. The above information is an  only. It is not intended as medical advice for individual conditions or treatments. Talk to your doctor, nurse or pharmacist before following any medical regimen to see if it is safe and effective for you.   © Copyright [employer] 2023 Information is for End User's use only and may not be sold, redistributed or otherwise used for commercial purposes. Kick Counts in Pregnancy   AMBULATORY CARE:   Kick counts  measure how much your baby is moving in your womb. A kick from your baby can be felt as a twist, turn, swish, roll, or jab. It is common to feel your baby kicking at 26 to 28 weeks of pregnancy. You may feel your baby kick as early as 20 weeks of pregnancy. You may want to start counting at 28 weeks. Contact your doctor immediately if:   You feel a change in the number of kicks or movements of your baby. You feel fewer than 10 kicks within 2 hours. You have questions or concerns about your baby's movements. Why measure kick counts:  Your baby's movement may provide information about your baby's health. He or she may move less, or not at all, if there are problems. Your baby may move less if he or she is not getting enough oxygen or nutrition from the placenta. Do not smoke while you are pregnant. Smoking decreases the amount of oxygen that gets to your baby. Talk to your healthcare provider if you need help to quit smoking. Tell your healthcare provider as soon as you feel a change in your baby's movements. When to measure kick counts:   Measure kick counts at the same time every day. Measure kick counts when your baby is awake and most active. Your baby may be most active in the evening. How to measure kick counts:  Check that your baby is awake before you measure kick counts. You can wake up your baby by lightly pushing on your belly, walking, or drinking something cold. Your healthcare provider may tell you different ways to measure kick counts. You may be told to do the following:  Use a chart or clock to keep track of the time you start and finish counting. Sit in a chair or lie on your left side. Place your hands on the largest part of your belly. Count until you reach 10 kicks. Write down how much time it takes to count 10 kicks. It may take 30 minutes to 2 hours to count 10 kicks. It should not take more than 2 hours to count 10 kicks. Follow up with your doctor as directed:  Write down your questions so you remember to ask them during your visits. © Copyright Elisha Goltz 2023 Information is for End User's use only and may not be sold, redistributed or otherwise used for commercial purposes. The above information is an  only. It is not intended as medical advice for individual conditions or treatments. Talk to your doctor, nurse or pharmacist before following any medical regimen to see if it is safe and effective for you.

## 2023-10-18 ENCOUNTER — TELEPHONE (OUTPATIENT)
Dept: OBGYN CLINIC | Facility: CLINIC | Age: 33
End: 2023-10-18

## 2023-10-18 LAB
2ND TRIMESTER 4 SCREEN SERPL-IMP: ABNORMAL
AFP ADJ MOM SERPL: 4.26
AFP INTERP AMN-IMP: ABNORMAL
AFP INTERP SERPL-IMP: ABNORMAL
AFP INTERP SERPL-IMP: ABNORMAL
AFP SERPL-MCNC: 118.4 NG/ML
AGE AT DELIVERY: 34.9 YR
GA METHOD: ABNORMAL
GA: 15 WEEKS
GP B STREP DNA SPEC QL NAA+PROBE: NEGATIVE
HGB A MFR BLD: 2.7 % (ref 1.8–3.2)
HGB A MFR BLD: 97.3 % (ref 96.4–98.8)
HGB F MFR BLD: 0 % (ref 0–2)
HGB FRACT BLD-IMP: NORMAL
HGB S MFR BLD: 0 %
IDDM PATIENT QL: NO
MULTIPLE PREGNANCY: NO
NEURAL TUBE DEFECT RISK FETUS: 37 %

## 2023-10-18 NOTE — TELEPHONE ENCOUNTER
----- Message from Juan Jose Cadet, [address] [place] [address] sent at 10/18/2023  1:17 PM EDT -----  Please inform pt that her GBS was negative.   Thanks

## 2023-10-18 NOTE — TELEPHONE ENCOUNTER
Attempted to call, left a message referring patient to my chart to review recent test results. Office number provided in message in case of any questions or concerns.

## 2023-10-19 ENCOUNTER — TELEPHONE (OUTPATIENT)
Dept: OBGYN CLINIC | Facility: CLINIC | Age: 33
End: 2023-10-19

## 2023-10-19 DIAGNOSIS — Z01.84 LACK OF IMMUNITY TO HEPATITIS B VIRUS DEMONSTRATED BY SEROLOGIC TEST: Primary | ICD-10-CM

## 2023-10-19 NOTE — TELEPHONE ENCOUNTER
----- Message from Toan Vasquez, [address] [facility] sent at 10/19/2023  1:10 PM EDT -----  Please let Kalyani know her hgb electrophoresis was normal,   Her MSAFP is positive due to it being drawn late in her pregnancy, please call lab to inform. Hepatitis B surface antibody shows that she has lack of immunity to hepatitis B recommend vaccinate postpartum.

## 2023-10-23 ENCOUNTER — ANESTHESIA EVENT (INPATIENT)
Dept: ANESTHESIOLOGY | Facility: HOSPITAL | Age: 33
DRG: 560 | End: 2023-10-23
Payer: COMMERCIAL

## 2023-10-23 ENCOUNTER — HOSPITAL ENCOUNTER (INPATIENT)
Facility: HOSPITAL | Age: 33
LOS: 2 days | Discharge: HOME | DRG: 560 | End: 2023-10-25
Attending: OBSTETRICS & GYNECOLOGY | Admitting: OBSTETRICS & GYNECOLOGY
Payer: COMMERCIAL

## 2023-10-23 ENCOUNTER — APPOINTMENT (OUTPATIENT)
Dept: LABOR AND DELIVERY | Facility: HOSPITAL | Age: 33
DRG: 560 | End: 2023-10-23
Payer: COMMERCIAL

## 2023-10-23 ENCOUNTER — ANESTHESIA (INPATIENT)
Dept: ANESTHESIOLOGY | Facility: HOSPITAL | Age: 33
DRG: 560 | End: 2023-10-23
Payer: COMMERCIAL

## 2023-10-23 DIAGNOSIS — Z3A.38 38 WEEKS GESTATION OF PREGNANCY: ICD-10-CM

## 2023-10-23 DIAGNOSIS — Z30.013 ENCOUNTER FOR INITIAL PRESCRIPTION OF INJECTABLE CONTRACEPTIVE: ICD-10-CM

## 2023-10-23 PROBLEM — O47.9 UTERINE CONTRACTIONS: Status: ACTIVE | Noted: 2023-10-23

## 2023-10-23 PROBLEM — O13.9 GESTATIONAL HYPERTENSION: Chronic | Status: ACTIVE | Noted: 2023-10-23

## 2023-10-23 PROBLEM — O13.9 GESTATIONAL HYPERTENSION: Status: ACTIVE | Noted: 2023-10-23

## 2023-10-23 PROBLEM — Z34.90 ENCOUNTER FOR INDUCTION OF LABOR: Status: ACTIVE | Noted: 2023-10-23

## 2023-10-23 PROBLEM — Z01.84 LACK OF IMMUNITY TO HEPATITIS B VIRUS DEMONSTRATED BY SEROLOGIC TEST: Chronic | Status: ACTIVE | Noted: 2023-10-19

## 2023-10-23 LAB
ABO GROUP BLD: NORMAL
ALBUMIN SERPL BCP-MCNC: 3.6 G/DL (ref 3.5–5)
ALP SERPL-CCNC: 147 U/L (ref 34–104)
ALT SERPL W P-5'-P-CCNC: 20 U/L (ref 7–52)
ANION GAP SERPL CALCULATED.3IONS-SCNC: 8 MMOL/L
AST SERPL W P-5'-P-CCNC: 23 U/L (ref 13–39)
BASE EXCESS BLDCOA CALC-SCNC: -7.3 MMOL/L (ref 3–11)
BASE EXCESS BLDCOV CALC-SCNC: -5 MMOL/L (ref 1–9)
BILIRUB SERPL-MCNC: 1.26 MG/DL (ref 0.2–1)
BLD GP AB SCN SERPL QL: NEGATIVE
BUN SERPL-MCNC: 6 MG/DL (ref 5–25)
CALCIUM SERPL-MCNC: 9.1 MG/DL (ref 8.4–10.2)
CHLORIDE SERPL-SCNC: 107 MMOL/L (ref 96–108)
CO2 SERPL-SCNC: 20 MMOL/L (ref 21–32)
CREAT SERPL-MCNC: 0.49 MG/DL (ref 0.6–1.3)
CREAT UR-MCNC: 145.1 MG/DL
ERYTHROCYTE [DISTWIDTH] IN BLOOD BY AUTOMATED COUNT: 13.8 % (ref 11.6–15.1)
GFR SERPL CREATININE-BSD FRML MDRD: 127 ML/MIN/1.73SQ M
GLUCOSE SERPL-MCNC: 69 MG/DL (ref 65–140)
GLUCOSE SERPL-MCNC: 72 MG/DL (ref 65–140)
GLUCOSE SERPL-MCNC: 73 MG/DL (ref 65–140)
GLUCOSE SERPL-MCNC: 74 MG/DL (ref 65–140)
GLUCOSE SERPL-MCNC: 75 MG/DL (ref 65–140)
GLUCOSE SERPL-MCNC: 75 MG/DL (ref 65–140)
GLUCOSE SERPL-MCNC: 76 MG/DL (ref 65–140)
GLUCOSE SERPL-MCNC: 80 MG/DL (ref 65–140)
HCO3 BLDCOA-SCNC: 19.9 MMOL/L (ref 17.3–27.3)
HCO3 BLDCOV-SCNC: 20.8 MMOL/L (ref 12.2–28.6)
HCT VFR BLD AUTO: 34.9 % (ref 34.8–46.1)
HGB BLD-MCNC: 11.6 G/DL (ref 11.5–15.4)
HOLD SPECIMEN: NORMAL
MCH RBC QN AUTO: 29.4 PG (ref 26.8–34.3)
MCHC RBC AUTO-ENTMCNC: 33.2 G/DL (ref 31.4–37.4)
MCV RBC AUTO: 89 FL (ref 82–98)
O2 CT VFR BLDCOA CALC: 12 ML/DL
OXYHGB MFR BLDCOA: 56.8 %
OXYHGB MFR BLDCOV: 63.9 %
PCO2 BLDCOA: 46.5 MM[HG] (ref 30–60)
PCO2 BLDCOV: 41.5 MM HG (ref 27–43)
PH BLDCOA: 7.25 [PH] (ref 7.23–7.43)
PH BLDCOV: 7.32 [PH] (ref 7.19–7.49)
PLATELET # BLD AUTO: 228 THOUSANDS/UL (ref 149–390)
PMV BLD AUTO: 10.8 FL (ref 8.9–12.7)
PO2 BLDCOA: 26.3 MM HG (ref 5–25)
PO2 BLDCOV: 27 MM HG (ref 15–45)
POTASSIUM SERPL-SCNC: 3.7 MMOL/L (ref 3.5–5.3)
PROT SERPL-MCNC: 6.4 G/DL (ref 6.4–8.4)
PROT UR-MCNC: 38 MG/DL
PROT/CREAT UR: 0.26 MG/G{CREAT} (ref 0–0.1)
RBC # BLD AUTO: 3.94 MILLION/UL (ref 3.81–5.12)
RH BLD: POSITIVE
SAO2 % BLDCOV: 13.3 ML/DL
SODIUM SERPL-SCNC: 135 MMOL/L (ref 135–147)
SPECIMEN EXPIRATION DATE: NORMAL
TREPONEMA PALLIDUM IGG+IGM AB [PRESENCE] IN SERUM OR PLASMA BY IMMUNOASSAY: NORMAL
WBC # BLD AUTO: 8.59 THOUSAND/UL (ref 4.31–10.16)

## 2023-10-23 PROCEDURE — 3E033VJ INTRODUCTION OF OTHER HORMONE INTO PERIPHERAL VEIN, PERCUTANEOUS APPROACH: ICD-10-PCS | Performed by: OBSTETRICS & GYNECOLOGY

## 2023-10-23 PROCEDURE — 86901 BLOOD TYPING SEROLOGIC RH(D): CPT

## 2023-10-23 PROCEDURE — 86780 TREPONEMA PALLIDUM: CPT

## 2023-10-23 PROCEDURE — 59409 OBSTETRICAL CARE: CPT | Performed by: OBSTETRICS & GYNECOLOGY

## 2023-10-23 PROCEDURE — 82805 BLOOD GASES W/O2 SATURATION: CPT | Performed by: OBSTETRICS & GYNECOLOGY

## 2023-10-23 PROCEDURE — 88307 TISSUE EXAM BY PATHOLOGIST: CPT | Performed by: PATHOLOGY

## 2023-10-23 PROCEDURE — 86900 BLOOD TYPING SEROLOGIC ABO: CPT

## 2023-10-23 PROCEDURE — 80053 COMPREHEN METABOLIC PANEL: CPT

## 2023-10-23 PROCEDURE — NC001 PR NO CHARGE: Performed by: OBSTETRICS & GYNECOLOGY

## 2023-10-23 PROCEDURE — 85027 COMPLETE CBC AUTOMATED: CPT

## 2023-10-23 PROCEDURE — 82948 REAGENT STRIP/BLOOD GLUCOSE: CPT

## 2023-10-23 PROCEDURE — 82570 ASSAY OF URINE CREATININE: CPT

## 2023-10-23 PROCEDURE — 99214 OFFICE O/P EST MOD 30 MIN: CPT

## 2023-10-23 PROCEDURE — 86850 RBC ANTIBODY SCREEN: CPT

## 2023-10-23 PROCEDURE — 84156 ASSAY OF PROTEIN URINE: CPT

## 2023-10-23 PROCEDURE — 10907ZC DRAINAGE OF AMNIOTIC FLUID, THERAPEUTIC FROM PRODUCTS OF CONCEPTION, VIA NATURAL OR ARTIFICIAL OPENING: ICD-10-PCS | Performed by: OBSTETRICS & GYNECOLOGY

## 2023-10-23 RX ORDER — POLYETHYLENE GLYCOL 3350 17 G/17G
17 POWDER, FOR SOLUTION ORAL DAILY
Status: DISCONTINUED | OUTPATIENT
Start: 2023-10-24 | End: 2023-10-25 | Stop reason: HOSPADM

## 2023-10-23 RX ORDER — BUPIVACAINE HYDROCHLORIDE 2.5 MG/ML
30 INJECTION, SOLUTION EPIDURAL; INFILTRATION; INTRACAUDAL ONCE AS NEEDED
Status: DISCONTINUED | OUTPATIENT
Start: 2023-10-23 | End: 2023-10-23

## 2023-10-23 RX ORDER — SODIUM CHLORIDE, SODIUM LACTATE, POTASSIUM CHLORIDE, CALCIUM CHLORIDE 600; 310; 30; 20 MG/100ML; MG/100ML; MG/100ML; MG/100ML
125 INJECTION, SOLUTION INTRAVENOUS CONTINUOUS
Status: DISCONTINUED | OUTPATIENT
Start: 2023-10-23 | End: 2023-10-23

## 2023-10-23 RX ORDER — OXYTOCIN/RINGER'S LACTATE 30/500 ML
250 PLASTIC BAG, INJECTION (ML) INTRAVENOUS ONCE
Status: COMPLETED | OUTPATIENT
Start: 2023-10-23 | End: 2023-10-23

## 2023-10-23 RX ORDER — ONDANSETRON 2 MG/ML
4 INJECTION INTRAMUSCULAR; INTRAVENOUS EVERY 6 HOURS PRN
Status: DISCONTINUED | OUTPATIENT
Start: 2023-10-23 | End: 2023-10-23

## 2023-10-23 RX ORDER — LIDOCAINE HYDROCHLORIDE AND EPINEPHRINE 15; 5 MG/ML; UG/ML
INJECTION, SOLUTION EPIDURAL
Status: COMPLETED | OUTPATIENT
Start: 2023-10-23 | End: 2023-10-23

## 2023-10-23 RX ORDER — OXYTOCIN/RINGER'S LACTATE 30/500 ML
1-30 PLASTIC BAG, INJECTION (ML) INTRAVENOUS
Status: DISCONTINUED | OUTPATIENT
Start: 2023-10-23 | End: 2023-10-23

## 2023-10-23 RX ORDER — CALCIUM CARBONATE 500 MG/1
1000 TABLET, CHEWABLE ORAL DAILY PRN
Status: DISCONTINUED | OUTPATIENT
Start: 2023-10-23 | End: 2023-10-25 | Stop reason: HOSPADM

## 2023-10-23 RX ORDER — ROPIVACAINE HYDROCHLORIDE 2 MG/ML
INJECTION, SOLUTION EPIDURAL; INFILTRATION; PERINEURAL
Status: COMPLETED | OUTPATIENT
Start: 2023-10-23 | End: 2023-10-23

## 2023-10-23 RX ORDER — SIMETHICONE 80 MG
80 TABLET,CHEWABLE ORAL 4 TIMES DAILY PRN
Status: DISCONTINUED | OUTPATIENT
Start: 2023-10-23 | End: 2023-10-25 | Stop reason: HOSPADM

## 2023-10-23 RX ORDER — ONDANSETRON 2 MG/ML
4 INJECTION INTRAMUSCULAR; INTRAVENOUS EVERY 8 HOURS PRN
Status: DISCONTINUED | OUTPATIENT
Start: 2023-10-23 | End: 2023-10-25 | Stop reason: HOSPADM

## 2023-10-23 RX ORDER — DIPHENHYDRAMINE HCL 25 MG
25 TABLET ORAL EVERY 6 HOURS PRN
Status: DISCONTINUED | OUTPATIENT
Start: 2023-10-23 | End: 2023-10-25 | Stop reason: HOSPADM

## 2023-10-23 RX ORDER — MEDROXYPROGESTERONE ACETATE 150 MG/ML
150 INJECTION, SUSPENSION INTRAMUSCULAR ONCE
Status: DISCONTINUED | OUTPATIENT
Start: 2023-10-23 | End: 2023-10-25

## 2023-10-23 RX ORDER — DIAPER,BRIEF,INFANT-TODD,DISP
1 EACH MISCELLANEOUS DAILY PRN
Status: DISCONTINUED | OUTPATIENT
Start: 2023-10-23 | End: 2023-10-25 | Stop reason: HOSPADM

## 2023-10-23 RX ORDER — IBUPROFEN 600 MG/1
600 TABLET ORAL EVERY 6 HOURS
Status: DISCONTINUED | OUTPATIENT
Start: 2023-10-23 | End: 2023-10-25 | Stop reason: HOSPADM

## 2023-10-23 RX ORDER — ACETAMINOPHEN 325 MG/1
650 TABLET ORAL EVERY 4 HOURS PRN
Status: DISCONTINUED | OUTPATIENT
Start: 2023-10-23 | End: 2023-10-25 | Stop reason: HOSPADM

## 2023-10-23 RX ADMIN — SODIUM CHLORIDE, SODIUM LACTATE, POTASSIUM CHLORIDE, AND CALCIUM CHLORIDE 125 ML/HR: .6; .31; .03; .02 INJECTION, SOLUTION INTRAVENOUS at 04:09

## 2023-10-23 RX ADMIN — OXYTOCIN 250 MILLI-UNITS/MIN: 10 INJECTION INTRAVENOUS at 13:47

## 2023-10-23 RX ADMIN — SODIUM CHLORIDE, SODIUM LACTATE, POTASSIUM CHLORIDE, AND CALCIUM CHLORIDE 999 ML/HR: .6; .31; .03; .02 INJECTION, SOLUTION INTRAVENOUS at 09:03

## 2023-10-23 RX ADMIN — ONDANSETRON 4 MG: 2 INJECTION INTRAMUSCULAR; INTRAVENOUS at 12:56

## 2023-10-23 RX ADMIN — ROPIVACAINE HYDROCHLORIDE: 2 INJECTION, SOLUTION EPIDURAL; INFILTRATION at 09:09

## 2023-10-23 RX ADMIN — IBUPROFEN 600 MG: 600 TABLET ORAL at 15:07

## 2023-10-23 RX ADMIN — BENZOCAINE AND LEVOMENTHOL 1 APPLICATION: 200; 5 SPRAY TOPICAL at 15:08

## 2023-10-23 RX ADMIN — SODIUM CHLORIDE, SODIUM LACTATE, POTASSIUM CHLORIDE, AND CALCIUM CHLORIDE 125 ML/HR: .6; .31; .03; .02 INJECTION, SOLUTION INTRAVENOUS at 12:00

## 2023-10-23 RX ADMIN — IBUPROFEN 600 MG: 600 TABLET ORAL at 21:35

## 2023-10-23 RX ADMIN — ROPIVACAINE HYDROCHLORIDE 5 ML: 2 INJECTION EPIDURAL; INFILTRATION; PERINEURAL at 11:48

## 2023-10-23 RX ADMIN — LIDOCAINE HYDROCHLORIDE,EPINEPHRINE BITARTRATE 3 ML: 15; .005 INJECTION, SOLUTION EPIDURAL; INFILTRATION; INTRACAUDAL; PERINEURAL at 08:58

## 2023-10-23 RX ADMIN — LIDOCAINE HYDROCHLORIDE AND EPINEPHRINE 3 ML: 15; 5 INJECTION, SOLUTION EPIDURAL at 11:46

## 2023-10-23 RX ADMIN — OXYTOCIN 2 MILLI-UNITS/MIN: 10 INJECTION INTRAVENOUS at 05:35

## 2023-10-23 NOTE — DISCHARGE SUMMARY
Obstetrics Discharge Summary  Moses Gong 28 y.o. female MRN: [id number]  Unit/Bed#: -01 Encounter: [id number]    Admission Date: 10/23/2023     Discharge Date: 10/25/23    Admitting Attending: Dr. Rosendo Coppola  Delivery Attending: Dr. Zbigniew Villafuerte  Discharging Attending:  Dr. Rosendo Coppola    Admitting Diagnoses:   Pregnancy at 45 weeks gestation   FGR  Hepatitis B non-immune    Discharge Diagnoses:   Same, delivered      Procedures: Spontaneous vaginal delivery    Anesthesia: epidural    Hospital course: Moses Gong is now a 28 y.o. [id number] who was initially admitted for labor. Her pregnancy was complicated by FGR with normal Dopplers and hep B nonimmune status. On exam she was noted to be 2/50/-3. She was started on Pitocin. She received an epidural and amniotomy was performed for clear fluid. She progressed to complete cervical dilation and began pushing. Delivery Findings:  [place] delivered a viable female  on 10/23/2023  1:46 PM  via Vaginal, Spontaneous  . The delivery was uncomplicated. Patient tolerated the procedure well.  was admitted to the  nursery. Baby's Weight: 2665 g (5 lb 14 oz) ; 94     Apgar scores: 9  and 9  at 1 and 5 minutes, respectively  QBL: Non-Surgical QBL (mL): 0        Her post-delivery course was uncomplicated. Her postpartum pain was well controlled with oral analgesics. Maternal blood type is AB positive so RhoGAM was not indicated. On day of discharge, she was ambulating and able to reasonably perform all ADLs. She was voiding and had appropriate bowel function. Pain was well controlled. She was discharged home on postpartum day #2 without complications. Patient was instructed to follow up with her OBGYN as an outpatient and was given appropriate warnings to call provider if she develops signs of infection or uncontrolled pain.     Complications: none apparent    Condition at discharge: stable     Disposition: Home    Planned Readmission: No    Discharge Medications:   Please see AVS for a complete list of discharge medications. Discharge instructions :   -Do not place anything (no partner, tampons or douche) in your vagina for 6 weeks  -You may walk for exercise for the first 6 weeks then gradually return to your usual activities   -Please do not drive for 1 week if you have no stitches and for 2 weeks if you have stitches    -You may take baths or shower per your preference   -Please examine your breasts in the mirror daily and call your doctor for redness or tenderness or increased warmth    -Please call your doctor's office if temperature > 100.4*F or 38* C, worsening pain or a foul discharge.     Follow Up:  - Follow up in 1 weeks blood pressure check visit    Di Liu MD   PGY-I, OBGYN  10/25/2023  6:21 AM

## 2023-10-23 NOTE — OB LABOR/OXYTOCIN SAFETY PROGRESS
Oxytocin Safety Progress Check Note - Kalyani Riggs 28 y.o. female MRN: [id number]    Unit/Bed#: -01 Encounter: [id number]    Dose (kallie-units/min) Oxytocin: 8 kallie-units/min  Contraction Frequency (minutes): 1-2.5  Contraction Quality: Moderate  Tachysystole: No   Cervical Dilation: 6        Cervical Effacement: 90  Fetal Station: -1  Baseline Rate: 135 bpm  Fetal Heart Rate: 130 BPM  FHR Category: 1               Vital Signs: =  Vitals:    10/23/23 1225   BP: (!) 137/102   Pulse: 99   Resp:    Temp:    SpO2:        Notes/comments:   Patient SVE as above. Patient is currently comfortable with epidural.   Continue pitocin titration. Discussed with Dr. Anders Trinidad.         Melisa Mc MD 10/23/2023 12:45 PM

## 2023-10-23 NOTE — H&P
[address] [facility] Riya File 28 y.o. female MRN: [id number]  Unit/Bed#: LD TRIAGE 2-01 Encounter: [id number]    Assessment: 28 y.o. Freddy Doom at 38w3d admitted for labor and FGR at 3%. SVE: 2/50/-2  FHT: cat1  Clinical EFW: 3% ; Cephalic confirmed by TAUS   GBS status: negative   Postpartum contraception plan: Depo bridge to tubal    Plan:   Lack of immunity to hepatitis B virus demonstrated by serologic test  Assessment & Plan  Vaccinate postpartum    Back pain complicating pregnancy  Assessment & Plan  Symptomatic management  Epidural offered to patient  Continue to monitor    Intrauterine growth restriction affecting antepartum care of mother in third trimester  Assessment & Plan  EFW 3 % on US 9/29/23  TAUS  IOL if labor does not spontaneously progress    Obesity affecting pregnancy  Assessment & Plan  Continue to monitor    38 weeks gestation of pregnancy  Assessment & Plan  Level 2 on 6/16/23, f/u growth at [facility]- pt needs to schedule, US done 9/28/23 evolving FGR with EFW 3 %, scan scheduled for 10/23/23  MSAFP- did not completed  Bottle feed  Desires sterilization- MA 31 signed 9/21/23, depo to bridge   Declines blood products  Declines flu vaccine. Discussed case and plan w/ Dr. Marii Núñez. Chief Complaint: Pelvic pain and back pain    HPI: Dionne Moyer is a 28 y.o. [id number] with an LOIDA of 11/3/2023, by Ultrasound at 38w3d who is being admitted for labor . She complains of uterine contractions, occurring every 5 minutes and denies fever, has no LOF, and reports no VB. She states she has slightly less fetal movement today but states fetus is still fairly active. She complains of 9/10 back and pelvic pain. The back pain radiates down her legs and patient states that she feels "shaky" and less stable on legs.  Patient had a medical IOL scheduled for later today 10/23/23 at 8pm.    Patient Active Problem List   Diagnosis    High risk heterosexual behavior    Bacterial vaginosis    DOMITILA II (cervical intraepithelial neoplasia II)    38 weeks gestation of pregnancy    Nausea and vomiting in pregnancy    Asthma    Obesity affecting pregnancy    Request for sterilization    Need for vaccination    Intrauterine growth restriction affecting antepartum care of mother in third trimester    Back pain complicating pregnancy    Lack of immunity to hepatitis B virus demonstrated by serologic test    Encounter for induction of labor    Uterine contractions       Baby complications/comments: Mild fetal growth restriction, estimated growth at 7th percentile    Review of Systems   Genitourinary:  Positive for pelvic pain. Negative for vaginal bleeding. Musculoskeletal:  Positive for back pain. Neurological:  Negative for dizziness and light-headedness. OB Hx:  OB History    Para Term  AB Living   4 3 3     3   SAB IAB Ectopic Multiple Live Births         0 3      # Outcome Date GA Lbr Warren/2nd Weight Sex Delivery Anes PTL Lv   4 Current            3 Term 16 38w5d 04:35 / 00:09 2353 g (5 lb 3 oz) F Vag-Spont None N MARTIN   2 Term         MARTIN   1 Term         MARTIN       Past Medical Hx:  Past Medical History:   Diagnosis Date    45 weeks gestation of pregnancy 2016    Abnormal Pap smear of cervix     Asthma     DOMITILA II (cervical intraepithelial neoplasia II)      (spontaneous vaginal delivery) 2016    UTI in pregnancy        Past Surgical hx:  No past surgical history on file. Social Hx:  Alcohol use: no  Tobacco use: no  Other substance use: None    No Known Allergies    Medications Prior to Admission   Medication    Prenatal Vit-Fe Fumarate-FA (Prenatal Vitamin) 27-0.8 MG TABS    albuterol (ProAir HFA) 90 mcg/act inhaler       Objective:  Temp:  [97.8 °F (36.6 °C)] 97.8 °F (36.6 °C)  HR:  [70-73] 73  Resp:  [17] 17  BP: (133-138)/(89-90) 133/89  Body mass index is 32.98 kg/m².      Physical Exam:  Physical Exam  Genitourinary:      Vulva normal.   Cardiovascular:      Rate and Rhythm: Normal rate. Pulmonary:      Effort: Pulmonary effort is normal.   Neurological:      General: No focal deficit present. Mental Status: She is alert and oriented to person, place, and time. Skin:     General: Skin is warm and dry.           SVE: 2/50/-2    FHT:  Baseline Rate: 145 bpm  Variability: Moderate 6-25 bpm  Accelerations: 15 x 15 or greater  Decelerations: None    TOCO:   Contraction Frequency (minutes): 1-5  Contraction Quality: Moderate    Lab Results   Component Value Date    WBC 8.16 10/16/2023    HGB 11.1 (L) 10/16/2023    HCT 33.7 (L) 10/16/2023     10/16/2023     No results found for: "NA", "K", "CL", "CO2", "BUN", "CREATININE", "GLUCOSE", "AST", "ALT"    Prenatal Labs: Reviewed      Blood type: AB Positive  Antibody: Negative  GBS: Positive  HIV: Negative  Rubella: Immune  Syphilis IgM/IgG: Non-reactive  HBsAg: Non-reactive  HCAb: Non-reactive  Chlamydia: Negative  Gonorrhea: Negative   Diabetes 1 hour screen: not completed  3 hour glucose: not completed  Platelets: 341  Hgb: 11.1  <2 Midnights  OBSERVATION    Signature/Title: Kimmy Bustillo MD  Date: 10/23/2023  Time: 2:45 AM

## 2023-10-23 NOTE — ASSESSMENT & PLAN NOTE
- Pain well controlled with oral analgesics  - Voiding spontaneously  - Tolerating PO fluids and solids  - Ambulating without difficulty  - Encourage breastfeeding and familial bonding, currently breast and bottle feeding  - Contraception: Desires sterilization- MA 31 signed 9/21/23, dolly bridge  - Requests discharge today

## 2023-10-23 NOTE — PLAN OF CARE
Problem: BIRTH - VAGINAL/ SECTION  Goal: Fetal and maternal status remain reassuring during the birth process  Description: INTERVENTIONS:  - Monitor vital signs  - Monitor fetal heart rate  - Monitor uterine activity  - Monitor labor progression (vaginal delivery)  - DVT prophylaxis  - Antibiotic prophylaxis  Outcome: Progressing  Goal: Emotionally satisfying birthing experience for mother/fetus  Description: Interventions:  - Assess, plan, implement and evaluate the nursing care given to the patient in labor  - Advocate the philosophy that each childbirth experience is a unique experience and support the family's chosen level of involvement and control during the labor process   - Actively participate in both the patient's and family's teaching of the birth process  - Consider cultural, [place] and age-specific factors and plan care for the patient in labor  Outcome: Progressing     Problem: PAIN - ADULT  Goal: Verbalizes/displays adequate comfort level or baseline comfort level  Description: Interventions:  - Encourage patient to monitor pain and request assistance  - Assess pain using appropriate pain scale  - Administer analgesics based on type and severity of pain and evaluate response  - Implement non-pharmacological measures as appropriate and evaluate response  - Consider cultural and social influences on pain and pain management  - Notify physician/advanced practitioner if interventions unsuccessful or patient reports new pain  Outcome: Progressing     Problem: INFECTION - ADULT  Goal: Absence or prevention of progression during hospitalization  Description: INTERVENTIONS:  - Assess and monitor for signs and symptoms of infection  - Monitor lab/diagnostic results  - Monitor all insertion sites, i.e. indwelling lines, tubes, and drains  - Monitor endotracheal if appropriate and nasal secretions for changes in amount and color  - [place] appropriate cooling/warming therapies per order  - Administer medications as ordered  - Instruct and encourage patient and family to use good hand hygiene technique  - Identify and instruct in appropriate isolation precautions for identified infection/condition  Outcome: Progressing  Goal: Absence of fever/infection during neutropenic period  Description: INTERVENTIONS:  - Monitor WBC    Outcome: Progressing     Problem: SAFETY ADULT  Goal: Patient will remain free of falls  Description: INTERVENTIONS:  - Educate patient/family on patient safety including physical limitations  - Instruct patient to call for assistance with activity   - Consult OT/PT to assist with strengthening/mobility   - Keep Call bell within reach  - Keep bed low and locked with side rails adjusted as appropriate  - Keep care items and personal belongings within reach  - Initiate and maintain comfort rounds  - Make Fall Risk Sign visible to staff  - Offer Toileting every  Hours, in advance of need  - Initiate/Maintain alarm  - Obtain necessary fall risk management equipment:   - Apply yellow socks and bracelet for high fall risk patients  - Consider moving patient to room near nurses station  Outcome: Progressing  Goal: Maintain or return to baseline ADL function  Description: INTERVENTIONS:  -  Assess patient's ability to carry out ADLs; assess patient's baseline for ADL function and identify physical deficits which impact ability to perform ADLs (bathing, care of mouth/teeth, toileting, grooming, dressing, etc.)  - Assess/evaluate cause of self-care deficits   - Assess range of motion  - Assess patient's mobility; develop plan if impaired  - Assess patient's need for assistive devices and provide as appropriate  - Encourage maximum independence but intervene and supervise when necessary  - Involve family in performance of ADLs  - Assess for home care needs following discharge   - Consider OT consult to assist with ADL evaluation and planning for discharge  - Provide patient education as appropriate  Outcome: Progressing  Goal: Maintains/Returns to pre admission functional level  Description: INTERVENTIONS:  - Perform BMAT or MOVE assessment daily.   - Set and communicate daily mobility goal to care team and patient/family/caregiver. - Collaborate with rehabilitation services on mobility goals if consulted  - Perform Range of Motion  times a day. - Reposition patient every  hours. - Dangle patient  times a day  - Stand patient  times a day  - Ambulate patient  times a day  - Out of bed to chair  times a day   - Out of bed for meals times a day  - Out of bed for toileting  - Record patient progress and toleration of activity level   Outcome: Progressing     Problem: Knowledge Deficit  Goal: Patient/family/caregiver demonstrates understanding of disease process, treatment plan, medications, and discharge instructions  Description: Complete learning assessment and assess knowledge base. Interventions:  - Provide teaching at level of understanding  - Provide teaching via preferred learning methods  Outcome: Progressing  Goal: Verbalizes understanding of labor plan  Description: Assess patient/family/caregiver's baseline knowledge level and ability to understand information. Provide education via patient/family/caregiver's preferred learning method at appropriate level of understanding. 1. Provide teaching at level of understanding. 2. Provide teaching via preferred learning method(s).   Outcome: Progressing     Problem: DISCHARGE PLANNING  Goal: Discharge to home or other facility with appropriate resources  Description: INTERVENTIONS:  - Identify barriers to discharge w/patient and caregiver  - Arrange for needed discharge resources and transportation as appropriate  - Identify discharge learning needs (meds, wound care, etc.)  - Arrange for interpretive services to assist at discharge as needed  - Refer to Case Management Department for coordinating discharge planning if the patient needs post-hospital services based on physician/advanced practitioner order or complex needs related to functional status, cognitive ability, or social support system  Outcome: Progressing     Problem: Labor & Delivery  Goal: Manages discomfort  Description: Assess and monitor for signs and symptoms of discomfort. Assess patient's pain level regularly and per hospital policy. Administer medications as ordered. Support use of nonpharmacological methods to help control pain such as distraction, imagery, relaxation, and application of heat and cold. Collaborate with interdisciplinary team and patient to determine appropriate pain management plan. 1. Include patient in decisions related to comfort. 2. Offer non-pharmacological pain management interventions. 3. Report ineffective pain management to physician. Outcome: Progressing  Goal: Patient vital signs are stable  Description: 1. Assess vital signs - vaginal delivery.   Outcome: Progressing

## 2023-10-23 NOTE — PLAN OF CARE
Problem: BIRTH - VAGINAL/ SECTION  Goal: Fetal and maternal status remain reassuring during the birth process  Description: INTERVENTIONS:  - Monitor vital signs  - Monitor fetal heart rate  - Monitor uterine activity  - Monitor labor progression (vaginal delivery)  - DVT prophylaxis  - Antibiotic prophylaxis  Outcome: Progressing  Goal: Emotionally satisfying birthing experience for mother/fetus  Description: Interventions:  - Assess, plan, implement and evaluate the nursing care given to the patient in labor  - Advocate the philosophy that each childbirth experience is a unique experience and support the family's chosen level of involvement and control during the labor process   - Actively participate in both the patient's and family's teaching of the birth process  - Consider cultural, [place] and age-specific factors and plan care for the patient in labor  Outcome: Progressing     Problem: PAIN - ADULT  Goal: Verbalizes/displays adequate comfort level or baseline comfort level  Description: Interventions:  - Encourage patient to monitor pain and request assistance  - Assess pain using appropriate pain scale  - Administer analgesics based on type and severity of pain and evaluate response  - Implement non-pharmacological measures as appropriate and evaluate response  - Consider cultural and social influences on pain and pain management  - Notify physician/advanced practitioner if interventions unsuccessful or patient reports new pain  Outcome: Progressing     Problem: INFECTION - ADULT  Goal: Absence or prevention of progression during hospitalization  Description: INTERVENTIONS:  - Assess and monitor for signs and symptoms of infection  - Monitor lab/diagnostic results  - Monitor all insertion sites, i.e. indwelling lines, tubes, and drains  - Monitor endotracheal if appropriate and nasal secretions for changes in amount and color  - [place] appropriate cooling/warming therapies per order  - Administer medications as ordered  - Instruct and encourage patient and family to use good hand hygiene technique  - Identify and instruct in appropriate isolation precautions for identified infection/condition  Outcome: Progressing  Goal: Absence of fever/infection during neutropenic period  Description: INTERVENTIONS:  - Monitor WBC    Outcome: Progressing     Problem: SAFETY ADULT  Goal: Patient will remain free of falls  Description: INTERVENTIONS:  - Educate patient/family on patient safety including physical limitations  - Instruct patient to call for assistance with activity   - Consult OT/PT to assist with strengthening/mobility   - Keep Call bell within reach  - Keep bed low and locked with side rails adjusted as appropriate  - Keep care items and personal belongings within reach  - Initiate and maintain comfort rounds  - Make Fall Risk Sign visible to staff  - Offer Toileting every  Hours, in advance of need  - Initiate/Maintain alarm  - Obtain necessary fall risk management equipment:   - Apply yellow socks and bracelet for high fall risk patients  - Consider moving patient to room near nurses station  Outcome: Progressing  Goal: Maintain or return to baseline ADL function  Description: INTERVENTIONS:  -  Assess patient's ability to carry out ADLs; assess patient's baseline for ADL function and identify physical deficits which impact ability to perform ADLs (bathing, care of mouth/teeth, toileting, grooming, dressing, etc.)  - Assess/evaluate cause of self-care deficits   - Assess range of motion  - Assess patient's mobility; develop plan if impaired  - Assess patient's need for assistive devices and provide as appropriate  - Encourage maximum independence but intervene and supervise when necessary  - Involve family in performance of ADLs  - Assess for home care needs following discharge   - Consider OT consult to assist with ADL evaluation and planning for discharge  - Provide patient education as appropriate  Outcome: Progressing  Goal: Maintains/Returns to pre admission functional level  Description: INTERVENTIONS:  - Perform BMAT or MOVE assessment daily.   - Set and communicate daily mobility goal to care team and patient/family/caregiver. - Collaborate with rehabilitation services on mobility goals if consulted  - Perform Range of Motion  times a day. - Reposition patient every  hours. - Dangle patient  times a day  - Stand patient  times a day  - Ambulate patient  times a day  - Out of bed to chair  times a day   - Out of bed for meals  times a day  - Out of bed for toileting  - Record patient progress and toleration of activity level   Outcome: Progressing     Problem: Knowledge Deficit  Goal: Patient/family/caregiver demonstrates understanding of disease process, treatment plan, medications, and discharge instructions  Description: Complete learning assessment and assess knowledge base. Interventions:  - Provide teaching at level of understanding  - Provide teaching via preferred learning methods  Outcome: Progressing  Goal: Verbalizes understanding of labor plan  Description: Assess patient/family/caregiver's baseline knowledge level and ability to understand information. Provide education via patient/family/caregiver's preferred learning method at appropriate level of understanding. 1. Provide teaching at level of understanding. 2. Provide teaching via preferred learning method(s).   Outcome: Progressing     Problem: DISCHARGE PLANNING  Goal: Discharge to home or other facility with appropriate resources  Description: INTERVENTIONS:  - Identify barriers to discharge w/patient and caregiver  - Arrange for needed discharge resources and transportation as appropriate  - Identify discharge learning needs (meds, wound care, etc.)  - Arrange for interpretive services to assist at discharge as needed  - Refer to Case Management Department for coordinating discharge planning if the patient needs post-hospital services based on physician/advanced practitioner order or complex needs related to functional status, cognitive ability, or social support system  Outcome: Progressing     Problem: Labor & Delivery  Goal: Manages discomfort  Description: Assess and monitor for signs and symptoms of discomfort. Assess patient's pain level regularly and per hospital policy. Administer medications as ordered. Support use of nonpharmacological methods to help control pain such as distraction, imagery, relaxation, and application of heat and cold. Collaborate with interdisciplinary team and patient to determine appropriate pain management plan. 1. Include patient in decisions related to comfort. 2. Offer non-pharmacological pain management interventions. 3. Report ineffective pain management to physician. Outcome: Progressing  Goal: Patient vital signs are stable  Description: 1. Assess vital signs - vaginal delivery.   Outcome: Progressing

## 2023-10-23 NOTE — PLAN OF CARE
Problem: PAIN - ADULT  Goal: Verbalizes/displays adequate comfort level or baseline comfort level  Description: Interventions:  - Encourage patient to monitor pain and request assistance  - Assess pain using appropriate pain scale  - Administer analgesics based on type and severity of pain and evaluate response  - Implement non-pharmacological measures as appropriate and evaluate response  - Consider cultural and social influences on pain and pain management  - Notify physician/advanced practitioner if interventions unsuccessful or patient reports new pain  Outcome: Progressing     Problem: INFECTION - ADULT  Goal: Absence or prevention of progression during hospitalization  Description: INTERVENTIONS:  - Assess and monitor for signs and symptoms of infection  - Monitor lab/diagnostic results  - Monitor all insertion sites, i.e. indwelling lines, tubes, and drains  - Monitor endotracheal if appropriate and nasal secretions for changes in amount and color  - [place] appropriate cooling/warming therapies per order  - Administer medications as ordered  - Instruct and encourage patient and family to use good hand hygiene technique  - Identify and instruct in appropriate isolation precautions for identified infection/condition  Outcome: Progressing  Goal: Absence of fever/infection during neutropenic period  Description: INTERVENTIONS:  - Monitor WBC    Outcome: Progressing     Problem: SAFETY ADULT  Goal: Patient will remain free of falls  Description: INTERVENTIONS:  - Educate patient/family on patient safety including physical limitations  - Instruct patient to call for assistance with activity   - Consult OT/PT to assist with strengthening/mobility   - Keep Call bell within reach  - Keep bed low and locked with side rails adjusted as appropriate  - Keep care items and personal belongings within reach  - Initiate and maintain comfort rounds  - Make Fall Risk Sign visible to staff  - Offer Toileting every  Hours, in advance of need  - Initiate/Maintain alarm  - Obtain necessary fall risk management equipment:   - Apply yellow socks and bracelet for high fall risk patients  - Consider moving patient to room near nurses station  Outcome: Progressing  Goal: Maintain or return to baseline ADL function  Description: INTERVENTIONS:  -  Assess patient's ability to carry out ADLs; assess patient's baseline for ADL function and identify physical deficits which impact ability to perform ADLs (bathing, care of mouth/teeth, toileting, grooming, dressing, etc.)  - Assess/evaluate cause of self-care deficits   - Assess range of motion  - Assess patient's mobility; develop plan if impaired  - Assess patient's need for assistive devices and provide as appropriate  - Encourage maximum independence but intervene and supervise when necessary  - Involve family in performance of ADLs  - Assess for home care needs following discharge   - Consider OT consult to assist with ADL evaluation and planning for discharge  - Provide patient education as appropriate  Outcome: Progressing  Goal: Maintains/Returns to pre admission functional level  Description: INTERVENTIONS:  - Perform BMAT or MOVE assessment daily.   - Set and communicate daily mobility goal to care team and patient/family/caregiver. - Collaborate with rehabilitation services on mobility goals if consulted  - Perform Range of Motion  times a day. - Reposition patient every  hours.   - Dangle patient  times a day  - Stand patient  times a day  - Ambulate patient  times a day  - Out of bed to chair  times a day   - Out of bed for meals  times a day  - Out of bed for toileting  - Record patient progress and toleration of activity level   Outcome: Progressing     Problem: Knowledge Deficit  Goal: Patient/family/caregiver demonstrates understanding of disease process, treatment plan, medications, and discharge instructions  Description: Complete learning assessment and assess knowledge base.  Interventions:  - Provide teaching at level of understanding  - Provide teaching via preferred learning methods  Outcome: Progressing  Goal: Verbalizes understanding of labor plan  Description: Assess patient/family/caregiver's baseline knowledge level and ability to understand information. Provide education via patient/family/caregiver's preferred learning method at appropriate level of understanding. 1. Provide teaching at level of understanding. 2. Provide teaching via preferred learning method(s). Outcome: Progressing     Problem: DISCHARGE PLANNING  Goal: Discharge to home or other facility with appropriate resources  Description: INTERVENTIONS:  - Identify barriers to discharge w/patient and caregiver  - Arrange for needed discharge resources and transportation as appropriate  - Identify discharge learning needs (meds, wound care, etc.)  - Arrange for interpretive services to assist at discharge as needed  - Refer to Case Management Department for coordinating discharge planning if the patient needs post-hospital services based on physician/advanced practitioner order or complex needs related to functional status, cognitive ability, or social support system  Outcome: Progressing     Problem: Labor & Delivery  Goal: Manages discomfort  Description: Assess and monitor for signs and symptoms of discomfort. Assess patient's pain level regularly and per hospital policy. Administer medications as ordered. Support use of nonpharmacological methods to help control pain such as distraction, imagery, relaxation, and application of heat and cold. Collaborate with interdisciplinary team and patient to determine appropriate pain management plan. 1. Include patient in decisions related to comfort. 2. Offer non-pharmacological pain management interventions. 3. Report ineffective pain management to physician. Outcome: Progressing  Goal: Patient vital signs are stable  Description: 1.  Assess vital signs - vaginal delivery.   Outcome: Progressing     Problem: POSTPARTUM  Goal: Experiences normal postpartum course  Description: INTERVENTIONS:  - Monitor maternal vital signs  - Assess uterine involution and lochia  Outcome: Progressing  Goal: Appropriate maternal -  bonding  Description: INTERVENTIONS:  - Identify family support  - Assess for appropriate maternal/infant bonding   -Encourage maternal/infant bonding opportunities  - Referral to  or  as needed  Outcome: Progressing  Goal: Establishment of infant feeding pattern  Description: INTERVENTIONS:  - Assess breast/bottle feeding  - Refer to lactation as needed  Outcome: Progressing  Goal: Incision(s), wounds(s) or drain site(s) healing without S/S of infection  Description: INTERVENTIONS  - Assess and document dressing, incision, wound bed, drain sites and surrounding tissue  - Provide patient and family education  - Perform skin care/dressing changes every   Outcome: Progressing

## 2023-10-23 NOTE — L&D DELIVERY NOTE
Vaginal Delivery Summary - OB/GYN   Lexus Tucker 28 y.o. female MRN: [id number]  Unit/Bed#: -01 Encounter: [id number]    Pre-delivery Diagnosis:   Pregnancy at 45 weeks gestation   FGR  Hepatitis B non-immune    Post-delivery Diagnosis:   Same, delivered    Procedure: Spontaneous Vaginal Delivery     Attending Physician: Dr. Martha Salinas  Resident Physician: Dr. Sharda Booth    Anesthesia: Epidural    EBL: 100cc  Admission H.6g/dL  Admission platelets: 655 thousands/uL    Complications: none apparent    Specimens:   1. Arterial and venous cord gases  2. Cord blood  3. Segment of umbilical cord  4. Placenta to pathology     Findings:  1. Viable female on 10/23/2023 at 1346, with APGARS of 9 and 9 at 1 and 5 minutes respectively. Weight pending at time of dictation for skin to skin bonding. 2. Spontaneous delivery of intact placenta at 1356  3. No lacerations    Gases:  Umbilical Artery  Recent Labs     10/23/23  1351   PHCART 7.250   BECART -7.3*       Umbilical Vein  Recent Labs     10/23/23  1351   PHCVEN 7.318   BECVEN -5.0*         Brief history and labor course:  Lexus Tucker was a 28 y.o.  at 38w3d who presented to labor and delivery for contractions. Her pregnancy was complicated by FGR with normal dopplers and Hep B non-immune status. On exam in triage she was noted to be 2/50/-2. She was admitted for labor. She was started on pitocin. She received an epidural and amniotomy was performed for clear fluid. She progressed to complete cervical dilation and began pushing. Description of delivery:    Patient delivered a viable female , wt pending as mother is doing skin to skin bonding. The fetal vertex delivered SANDRA position spontaneously. There was no nuchal cord. The anterior shoulder delivered atraumatically with maternal expulsive forces and the assistance of gentle downward traction.  The posterior shoulder delivered with maternal expulsive forces and the assistance of gentle upward traction. The remainder of the fetus delivered spontaneously. Upon delivery, the infant was placed on the mothers abdomen and the cord was doubly clamped and cut. Delayed cord clamping was achieved. The infant was noted to cry spontaneously and was moving all extremities appropriately. There was no evidence for injury. Nurse resuscitators evaluating the . Arterial and venous cord blood gases and cord blood was collected for analysis. These were promptly sent to the lab. In the immediate post-partum, active management of the 3rd stage of labor was performed with massage, the administration of 30 units of IV pitocin, and gentle traction on the umbilical cord. The placenta delivered spontaneously and was noted to have a marginally inserted 3 vessel cord. The placenta was sent to pathology. The vagina, cervix, perineum, and rectum were inspected and there was noted to be no lacerations. Bimanual exam revealed minimal clots and good uterine tone. The fundus was firm and at the level of the umbilicus. At the conclusion of the procedure, all needle, sponge, and instrument counts were noted to be correct. Patient tolerated the procedure well and was allowed to recover in labor and delivery room with family and  before being transferred to the post-partum floor. Dr. Mitzi Mcfadden was present and participated in all key portions of the case.     Disposition:  The patient and the  both tolerated the procedure well and are recovering in labor and delivery room       Tao Kyle MD  OB/GYN PGY-1  10/23/2023  2:12 PM

## 2023-10-23 NOTE — ASSESSMENT & PLAN NOTE
History of DOMITILA 2, initially planning for LEEP prior to pregnancy  Colposcopy 5/16/23 c/w LSIL impression, no biopsies taken  Plan for LEEP postpartum

## 2023-10-23 NOTE — OB LABOR/OXYTOCIN SAFETY PROGRESS
Oxytocin Safety Progress Check Note - Kalyani Ruiz 28 y.o. female MRN: [id number]    Unit/Bed#: -01 Encounter: [id number]    Dose (kallie-units/min) Oxytocin: 8 kallie-units/min  Contraction Frequency (minutes): 1.5-4  Contraction Quality: Moderate  Tachysystole: No   Cervical Dilation: 6        Cervical Effacement: 90  Fetal Station: -1  Baseline Rate: 130 bpm  Fetal Heart Rate: 132 BPM  FHR Category: 2               Vital Signs:   Vitals:    10/23/23 1258   BP: 109/72   Pulse: (!) 106   Resp:    Temp:    SpO2:        Notes/comments:   Patient complete. Category II strip due to recurrent variables. Dr. Sanchez Shorten aware.  Will begin pushing      Ann Armstrong MD 10/23/2023 1:23 PM

## 2023-10-23 NOTE — OB LABOR/OXYTOCIN SAFETY PROGRESS
Oxytocin Safety Progress Check Note - Kalyani Ritter File 28 y.o. female MRN: [id number]    Unit/Bed#: -01 Encounter: [id number]    Dose (kallie-units/min) Oxytocin: 6 kallie-units/min  Contraction Frequency (minutes): Difficulty tracing  Contraction Quality: Moderate  Tachysystole: No   Cervical Dilation: 2-3        Cervical Effacement: 50  Fetal Station: -2  Baseline Rate: 135 bpm  Fetal Heart Rate: 132 BPM  FHR Category: 1               Vital Signs:   Vitals:    10/23/23 0828   BP: 142/68   Pulse: 75   Resp:    Temp:    SpO2:        Notes/comments:   Patient resting comfortably. Discussed her reasons for declining blood products, and she reports she does not refuse them in emergency situations. Blood transfusion consent was signed, and she accepts ALL blood products if needed in the case of an emergency. FHT category 1. Contractions every 3-4 min. Pit running at 6, continue titrating to contractions. Patient requests epidural prior to AROM and SVE.   Will check and AROM once comfortable s/p epidural.    Benjamin Martins MD 10/23/2023 8:46 AM

## 2023-10-23 NOTE — ANESTHESIA POSTPROCEDURE EVALUATION
Post-Op Assessment Note               Post-op block assessment: catheter intact and no complications      No notable events documented.     BP      Temp      Pulse     Resp      SpO2

## 2023-10-23 NOTE — ANESTHESIA PROCEDURE NOTES
Epidural Block    Reason for block: procedure for pain  Staffing  Performed by: Murali Delacruz MD  Authorized by: Murali Delacruz MD    Preanesthetic Checklist  Completed: patient identified, IV checked, site marked, risks and benefits discussed, surgical consent, monitors and equipment checked, pre-op evaluation and timeout performed  Epidural  Patient position: sitting  Prep: ChloraPrep  Sedation Level: no sedation  Patient monitoring: frequent blood pressure checks, continuous pulse oximetry and heart rate  Approach: midline  Location: lumbar,  Injection technique: MARIBEL saline  Needle  Needle type: Tuohy   Needle gauge: 18 G  Needle insertion depth: 7 cm  Catheter type: multi-orifice  Catheter size: 20 G  Catheter at skin depth: 13 cm  Catheter securement method: stabilization device, clear occlusive dressing and tape  Test dose: negativeropivacaine (NAROPIN) 0.2% injection 10 mL - Epidural   5 mL - 10/23/2023 11:48:00 AM  lidocaine-epinephrine (XYLOCAINE-MPF/EPINEPHRINE) 1.5 %-1:200,000 injection 3 mL - Epidural   3 mL - 10/23/2023 11:46:00 AM  Assessment  Sensory level: T10  Number of attempts: 1negative aspiration for CSF, negative aspiration for heme and no paresthesia on injection  patient tolerated the procedure well with no immediate complications  Additional Notes  Called to evaluate patient. RN reported that the epidural catheter placed earlier today had dislodged. On exam the catheter was approximately 2-3 cm at the skin. Stabilization device and tegaderm were rolled up the patients back. Catheter was removed, tip intact. A new catheter was placed at the same level.

## 2023-10-23 NOTE — ANESTHESIA PROCEDURE NOTES
Epidural Block    Reason for block: procedure for pain  Staffing  Performed by: Sushant Donovan MD  Authorized by: Sushant Donovan MD    Preanesthetic Checklist  Completed: patient identified, IV checked, site marked, risks and benefits discussed, surgical consent, monitors and equipment checked, pre-op evaluation and timeout performed  Epidural  Patient position: sitting  Prep: ChloraPrep  Sedation Level: no sedation  Patient monitoring: frequent blood pressure checks, continuous pulse oximetry and heart rate  Approach: midline  Location: lumbar,  Injection technique: MARIBEL saline  Needle  Needle type: Tuohy   Needle gauge: 18 G  Needle insertion depth: 7.5 cm  Catheter type: multi-orifice  Catheter size: 20 G  Catheter at skin depth: 13 cm  Catheter securement method: stabilization device and clear occlusive dressing  Test dose: negativelidocaine-epinephrine (XYLOCAINE-MPF/EPINEPHRINE) 1.5 %-1:200,000 injection 3 mL - Epidural   3 mL - 10/23/2023 8:58:00 AM  Assessment  Sensory level: T10  Number of attempts: 1negative aspiration for CSF, negative aspiration for heme and no paresthesia on injection  patient tolerated the procedure well with no immediate complications

## 2023-10-23 NOTE — ANESTHESIA PREPROCEDURE EVALUATION
Procedure:  LABOR ANALGESIA    Relevant Problems   ANESTHESIA   (-) History of anesthesia complications      CARDIO   (-) Angina of effort      GYN   (+) 38 weeks gestation of pregnancy      PULMONARY   (+) Asthma        Physical Exam    Airway    Mallampati score: II  TM Distance: >3 FB  Neck ROM: full     Dental       Cardiovascular  No weak pulses    Pulmonary   No stridor    Other Findings        Anesthesia Plan  ASA Score- 2     Anesthesia Type- epidural with ASA Monitors. Additional Monitors:     Airway Plan:            Plan Factors-    Chart reviewed. Induction-     Postoperative Plan-     Informed Consent- Anesthetic plan and risks discussed with patient. I personally reviewed this patient with the CRNA. Discussed and agreed on the Anesthesia Plan with the CRNA. Darylene Pipe

## 2023-10-23 NOTE — OB LABOR/OXYTOCIN SAFETY PROGRESS
Oxytocin Safety Progress Check Note - Kalyani Lopez 28 y.o. female MRN: [id number]    Unit/Bed#: -01 Encounter: [id number]    Dose (kallie-units/min) Oxytocin: 6 kallie-units/min  Contraction Frequency (minutes): 4-5  Contraction Quality: Moderate  Tachysystole: No   Cervical Dilation: 4        Cervical Effacement: 70  Fetal Station: -3  Baseline Rate: 135 bpm  Fetal Heart Rate: 140 BPM  FHR Category: 1               Vital Signs:   Vitals:    10/23/23 1013   BP:    Pulse: 67   Resp:    Temp:    SpO2: 97%       Notes/comments:   SVE 4/70/-3. Amniotomy performed with clear fluid appreciated, minimal fluid return. Fetal hair palpable. FHT category 1. Pitocin 6 mU/min, ctx q4-5m. Continue titration as tolerated.       Venus Sylvester MD 10/23/2023 10:32 AM

## 2023-10-23 NOTE — OB LABOR/OXYTOCIN SAFETY PROGRESS
Labor Progress Note - Kalyani Mccray 28 y.o. female MRN: [id number]    Unit/Bed#: -01 Encounter: [id number]       Contraction Frequency (minutes): 2-5  Contraction Quality: Mild  Tachysystole: No   Cervical Dilation: 2-3        Cervical Effacement: 50  Fetal Station: -2  Baseline Rate: 140 bpm  Fetal Heart Rate: 140 BPM  FHR Category: 1               Vital Signs:   Vitals:    10/23/23 0411   BP: 135/83   Pulse: (!) 51   Resp: 18   Temp: 98 °F (36.7 °C)   SpO2: 98%       Patient comfortable through contractions. SVE as above. FHT cat 1. Matheus q2-5. Plan to start pitocin. D/w Dr. Wilmer Lewis.        Danay oMra MD 10/23/2023 5:16 AM

## 2023-10-23 NOTE — ASSESSMENT & PLAN NOTE
CBC/CMP wnl; UPC: 1.85  Systolic (53TQD), YE , Min:128 , JRH:427   Diastolic (46FND), OJW:07, Min:75, Max:75  Asymptomatic  Follow up in 1 week for blood pressure check

## 2023-10-23 NOTE — LETTER
[address] [place] [address]  Dept: [phone]    October 24, 2023     Patient: Estevan Garcia   YOB: 1990   Date of Visit: 10/23/2023       To Whom it May Concern:    Estevan Garcia is under my professional care, her partner Caio Hebert has been in the hospital during this time. She was seen in the hospital from 10/23/2023 to 10/25/2023. He may return to work on 10/26/23. If you have any questions or concerns, please don't hesitate to call.          Sincerely,          Karina Osuna MD

## 2023-10-24 PROCEDURE — 90746 HEPB VACCINE 3 DOSE ADULT IM: CPT

## 2023-10-24 PROCEDURE — 99024 POSTOP FOLLOW-UP VISIT: CPT | Performed by: OBSTETRICS & GYNECOLOGY

## 2023-10-24 RX ADMIN — ACETAMINOPHEN 650 MG: 325 TABLET, FILM COATED ORAL at 01:55

## 2023-10-24 RX ADMIN — IBUPROFEN 600 MG: 600 TABLET ORAL at 05:15

## 2023-10-24 RX ADMIN — HEPATITIS B VACCINE (RECOMBINANT) 20 MCG: 20 INJECTION, SUSPENSION INTRAMUSCULAR at 13:04

## 2023-10-24 RX ADMIN — POLYETHYLENE GLYCOL 3350 17 G: 17 POWDER, FOR SOLUTION ORAL at 08:31

## 2023-10-24 RX ADMIN — IBUPROFEN 600 MG: 600 TABLET ORAL at 22:52

## 2023-10-24 RX ADMIN — IBUPROFEN 600 MG: 600 TABLET ORAL at 11:55

## 2023-10-24 RX ADMIN — IBUPROFEN 600 MG: 600 TABLET ORAL at 16:40

## 2023-10-24 NOTE — PLAN OF CARE
Problem: PAIN - ADULT  Goal: Verbalizes/displays adequate comfort level or baseline comfort level  Description: Interventions:  - Encourage patient to monitor pain and request assistance  - Assess pain using appropriate pain scale  - Administer analgesics based on type and severity of pain and evaluate response  - Implement non-pharmacological measures as appropriate and evaluate response  - Consider cultural and social influences on pain and pain management  - Notify physician/advanced practitioner if interventions unsuccessful or patient reports new pain  Outcome: Progressing     Problem: INFECTION - ADULT  Goal: Absence or prevention of progression during hospitalization  Description: INTERVENTIONS:  - Assess and monitor for signs and symptoms of infection  - Monitor lab/diagnostic results  - Monitor all insertion sites, i.e. indwelling lines, tubes, and drains  - Monitor endotracheal if appropriate and nasal secretions for changes in amount and color  - [place] appropriate cooling/warming therapies per order  - Administer medications as ordered  - Instruct and encourage patient and family to use good hand hygiene technique  - Identify and instruct in appropriate isolation precautions for identified infection/condition  Outcome: Progressing  Goal: Absence of fever/infection during neutropenic period  Description: INTERVENTIONS:  - Monitor WBC    Outcome: Progressing     Problem: SAFETY ADULT  Goal: Patient will remain free of falls  Description: INTERVENTIONS:  - Educate patient/family on patient safety including physical limitations  - Instruct patient to call for assistance with activity   - Consult OT/PT to assist with strengthening/mobility   - Keep Call bell within reach  - Keep bed low and locked with side rails adjusted as appropriate  - Keep care items and personal belongings within reach  - Initiate and maintain comfort rounds  - Make Fall Risk Sign visible to staff  - Offer Toileting urs, in advance of need  - Initiate/Maintain alarm  - Obtain necessary fall risk management equipment:   - Apply yellow socks and bracelet for high fall risk patients  - Consider moving patient to room near nurses station  Outcome: Progressing  Goal: Maintain or return to baseline ADL function  Description: INTERVENTIONS:  -  Assess patient's ability to carry out ADLs; assess patient's baseline for ADL function and identify physical deficits which impact ability to perform ADLs (bathing, care of mouth/teeth, toileting, grooming, dressing, etc.)  - Assess/evaluate cause of self-care deficits   - Assess range of motion  - Assess patient's mobility; develop plan if impaired  - Assess patient's need for assistive devices and provide as appropriate  - Encourage maximum independence but intervene and supervise when necessary  - Involve family in performance of ADLs  - Assess for home care needs following discharge   - Consider OT consult to assist with ADL evaluation and planning for discharge  - Provide patient education as appropriate  Outcome: Progressing  Goal: Maintains/Returns to pre admission functional level  Description: INTERVENTIONS:  - Perform BMAT or MOVE assessment daily.   - Set and communicate daily mobility goal to care team and patient/family/caregiver.    - Collaborate with rehabilitation services on mobility goals if consulted  - Perform Range of Motion   - Reposition patient   - Dangle patient   - Stand patient   - Ambulate patient   - Out of bed to chair   - Out of bed for meals   - Out of bed for toileting  - Record patient progress and toleration of activity level   Outcome: Progressing     Problem: DISCHARGE PLANNING  Goal: Discharge to home or other facility with appropriate resources  Description: INTERVENTIONS:  - Identify barriers to discharge w/patient and caregiver  - Arrange for needed discharge resources and transportation as appropriate  - Identify discharge learning needs (meds, wound care, etc.)  - Arrange for interpretive services to assist at discharge as needed  - Refer to Case Management Department for coordinating discharge planning if the patient needs post-hospital services based on physician/advanced practitioner order or complex needs related to functional status, cognitive ability, or social support system  Outcome: Progressing     Problem: POSTPARTUM  Goal: Experiences normal postpartum course  Description: INTERVENTIONS:  - Monitor maternal vital signs  - Assess uterine involution and lochia  Outcome: Progressing  Goal: Appropriate maternal -  bonding  Description: INTERVENTIONS:  - Identify family support  - Assess for appropriate maternal/infant bonding   -Encourage maternal/infant bonding opportunities  - Referral to  or  as needed  Outcome: Progressing  Goal: Establishment of infant feeding pattern  Description: INTERVENTIONS:  - Assess breast/bottle feeding  - Refer to lactation as needed  Outcome: Progressing  Goal: Incision(s), wounds(s) or drain site(s) healing without S/S of infection  Description: INTERVENTIONS  - Assess and document dressing, incision, wound bed, drain sites and surrounding tissue  - Provide patient and family education  - Perform skin care/dressing changes  Outcome: Progressing

## 2023-10-24 NOTE — DISCHARGE INSTR - AVS FIRST PAGE
You received your first vaccine of Hepatitis B in the hospital on 10/24/23. To complete the series, please obtain second dose in 1-2 months, and the last dose in 4-6 months. You can schedule an appointment at your local pharmacy for these vaccinations or at your PCP's office. Your OBGYN office will not have this vaccine.

## 2023-10-24 NOTE — PROGRESS NOTES
Progress Note - OB/GYN  Moses Gong 28 y.o. female MRN: [id number]  Unit/Bed#: -01 Encounter: [id number]    Assessment and Plan     Moses Gong is a patient of: [address] [place] [address]. She is PPD# 1 s/p . Recovering well and is stable. 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 She has no current complaints. Pain is well controlled with medications. Patient is currently voiding and ambulating without difficulty. Patient is currently passing flatus, tolerating PO diet, and denies nausea or vomitting. Patient denies fever, chills, chest pain, shortness of breath, or calf tenderness. Lochia is normal. She is currently Bottle feeding - we discussed the benefits of breastfeeding and she is interested in trying to breast feed. Would like to see lactation today. She is recovering well and is stable.        Vitals:   /85 (BP Location: Left arm) Comment: RN notified  Pulse 63   Temp 97.6 °F (36.4 °C) (Oral)   Resp 18   Ht 5' 3" (1.6 m)   Wt 84.4 kg (186 lb)   LMP  (LMP Unknown)   SpO2 99%   Breastfeeding No   BMI 32.95 kg/m²       Intake/Output Summary (Last 24 hours) at 10/24/2023 0547  Last data filed at 10/23/2023 2135  Gross per 24 hour   Intake 232.08 ml   Output 725 ml   Net -492.92 ml       Invasive Devices       Peripheral Intravenous Line  Duration             Peripheral IV 10/23/23 Left;Ventral (anterior) Forearm 1 day                    Physical Exam:   GEN: Geralynn Frederick appears well, alert and oriented x 3, pleasant and cooperative   CARDIO: RRR, no murmurs or rubs  RESP:  CTAB, no wheezes or rales  ABDOMEN: soft, no tenderness, no distention, fundus firm and below umbilicus  EXTREMITIES: SCDs on, non tender, no erythema    Labs:     Hemoglobin   Date Value Ref Range Status   10/23/2023 11.6 11.5 - 15.4 g/dL Final   10/16/2023 11.1 (L) 11.5 - 15.4 g/dL Final     WBC   Date Value Ref Range Status   10/23/2023 8.59 4.31 - 10.16 Thousand/uL Final   10/16/2023 8.16 4.31 - 10.16 Thousand/uL Final     Platelets   Date Value Ref Range Status   10/23/2023 228 149 - 390 Thousands/uL Final   10/16/2023 224 149 - 390 Thousands/uL Final     Creatinine   Date Value Ref Range Status   10/23/2023 0.49 (L) 0.60 - 1.30 mg/dL Final     Comment:     Standardized to IDMS reference method     AST   Date Value Ref Range Status   10/23/2023 23 13 - 39 U/L Final     ALT   Date Value Ref Range Status   10/23/2023 20 7 - 52 U/L Final     Comment:     Specimen collection should occur prior to Sulfasalazine administration due to the potential for falsely depressed results.            Shannan Medrano MD  10/24/2023  5:47 AM

## 2023-10-24 NOTE — LACTATION NOTE
This note was copied from a baby's chart. CONSULT - LACTATION  Baby Girl ([address]) Camden Alonso 1 days female MRN: [id number]    [facility] NURSERY Room / Bed: (N)/(N) Encounter: [id number]    Maternal Information     MOTHER:  Kalyani Trinh  Maternal Age: 28 y.o.   OB History: # 1 - Date: None, Sex: None, Weight: None, GA: None, Delivery: None, Apgar1: None, Apgar5: None, Living: Living, Birth Comments: None    # 2 - Date: None, Sex: None, Weight: None, GA: None, Delivery: None, Apgar1: None, Apgar5: None, Living: Living, Birth Comments: None    # 3 - Date: 16, Sex: Female, Weight: 2353 g (5 lb 3 oz), GA: 38w5d, Delivery: Vaginal, Spontaneous, Apgar1: 9, Apgar5: 9, Living: Living, Birth Comments: None    # 4 - Date: 10/23/23, Sex: Female, Weight: 2665 g (5 lb 14 oz), GA: 38w3d, Delivery: Vaginal, Spontaneous, Apgar1: 9, Apgar5: 9, Living: Living, Birth Comments: None   Previouse breast reduction surgery? No    Lactation history:   Has patient previously breast fed: No   How long had patient previously breast fed:     Previous breast feeding complications:     History reviewed. No pertinent surgical history. Birth information:  YOB: 2024   Time of birth: 1:46 PM   Sex: female   Delivery type: Vaginal, Spontaneous   Birth Weight: 2665 g (5 lb 14 oz)   Percent of Weight Change: 0%     Gestational Age: 36w1d   [unfilled]    Assessment     Breast and nipple assessment: normal assessment    [place] Assessment: normal assessment    Feeding assessment:  not assessed  LATCH:  Latch: Audible Swallowing:     Type of Nipple:     Comfort (Breast/Nipple):     Hold (Positioning):     LATCH Score:            Feeding recommendations:  breast feed on demand    Place baby at the breast every 2-3 hours ( pump and Supplement as needed )    Met with Kalyani who's feeding Intent on admission was to formula feed her baby girl.  She would like to attempt her baby at the breast.     The Ready Set Baby and the Discharge Breastfeeding Booklets were provided to her for review. Discussed Skin to Skin contact and benefits to mom and baby. Feeding cues and what that means for recognizing infant's hunger reviewed. Positioning and latch reviewed as well as showing images of other feeding positions. Discussed the properties of a good latch in any position. Reviewed hand/manual expression. Demonstrated the cross cradle and football holds on infant manikin as baby was sleeping and had just had 25 ml of formula a short time before consultation per mom. Also showed mom hand expression using the breast model. Set [place] up with a hand pump and electric breast pump with instructions on use. Instructed her to call Nursing Staff for breastfeeding assistance when baby is showing hunger cues. Instructed her to place baby at the breast before offering supplementation and to pump/hand express after feedings. All mom's questions were answered, encouraged her to call Lactation in the morning with additional questions and for breastfeeding support. Phone number provided.                 Celia Martinez RN 10/24/2023 5:39 PM

## 2023-10-24 NOTE — PLAN OF CARE
Problem: PAIN - ADULT  Goal: Verbalizes/displays adequate comfort level or baseline comfort level  Description: Interventions:  - Encourage patient to monitor pain and request assistance  - Assess pain using appropriate pain scale  - Administer analgesics based on type and severity of pain and evaluate response  - Implement non-pharmacological measures as appropriate and evaluate response  - Consider cultural and social influences on pain and pain management  - Notify physician/advanced practitioner if interventions unsuccessful or patient reports new pain  Outcome: Progressing     Problem: INFECTION - ADULT  Goal: Absence or prevention of progression during hospitalization  Description: INTERVENTIONS:  - Assess and monitor for signs and symptoms of infection  - Monitor lab/diagnostic results  - Monitor all insertion sites, i.e. indwelling lines, tubes, and drains  - Monitor endotracheal if appropriate and nasal secretions for changes in amount and color  - [place] appropriate cooling/warming therapies per order  - Administer medications as ordered  - Instruct and encourage patient and family to use good hand hygiene technique  - Identify and instruct in appropriate isolation precautions for identified infection/condition  Outcome: Progressing  Goal: Absence of fever/infection during neutropenic period  Description: INTERVENTIONS:  - Monitor WBC    Outcome: Progressing     Problem: SAFETY ADULT  Goal: Patient will remain free of falls  Description: INTERVENTIONS:  - Educate patient/family on patient safety including physical limitations  - Instruct patient to call for assistance with activity   - Consult OT/PT to assist with strengthening/mobility   - Keep Call bell within reach  - Keep bed low and locked with side rails adjusted as appropriate  - Keep care items and personal belongings within reach  - Initiate and maintain comfort rounds  Outcome: Progressing  Goal: Maintain or return to baseline ADL function  Description: INTERVENTIONS:  -  Assess patient's ability to carry out ADLs; assess patient's baseline for ADL function and identify physical deficits which impact ability to perform ADLs (bathing, care of mouth/teeth, toileting, grooming, dressing, etc.)  - Assess/evaluate cause of self-care deficits   - Assess range of motion  - Assess patient's mobility; develop plan if impaired  - Assess patient's need for assistive devices and provide as appropriate  - Encourage maximum independence but intervene and supervise when necessary  - Involve family in performance of ADLs  - Assess for home care needs following discharge   - Consider OT consult to assist with ADL evaluation and planning for discharge  - Provide patient education as appropriate  Outcome: Progressing  Goal: Maintains/Returns to pre admission functional level  Description: INTERVENTIONS:  - Perform BMAT or MOVE assessment daily.   - Set and communicate daily mobility goal to care team and patient/family/caregiver.    - Collaborate with rehabilitation services on mobility goals if consulted  - Out of bed for toileting  - Record patient progress and toleration of activity level   Outcome: Progressing     Problem: DISCHARGE PLANNING  Goal: Discharge to home or other facility with appropriate resources  Description: INTERVENTIONS:  - Identify barriers to discharge w/patient and caregiver  - Arrange for needed discharge resources and transportation as appropriate  - Identify discharge learning needs (meds, wound care, etc.)  - Arrange for interpretive services to assist at discharge as needed  - Refer to Case Management Department for coordinating discharge planning if the patient needs post-hospital services based on physician/advanced practitioner order or complex needs related to functional status, cognitive ability, or social support system  Outcome: Progressing     Problem: POSTPARTUM  Goal: Experiences normal postpartum course  Description: INTERVENTIONS:  - Monitor maternal vital signs  - Assess uterine involution and lochia  Outcome: Progressing  Goal: Appropriate maternal -  bonding  Description: INTERVENTIONS:  - Identify family support  - Assess for appropriate maternal/infant bonding   -Encourage maternal/infant bonding opportunities  - Referral to  or  as needed  Outcome: Progressing  Goal: Establishment of infant feeding pattern  Description: INTERVENTIONS:  - Assess breast/bottle feeding  - Refer to lactation as needed  Outcome: Progressing

## 2023-10-24 NOTE — PLAN OF CARE
Problem: PAIN - ADULT  Goal: Verbalizes/displays adequate comfort level or baseline comfort level  Description: Interventions:  - Encourage patient to monitor pain and request assistance  - Assess pain using appropriate pain scale  - Administer analgesics based on type and severity of pain and evaluate response  - Implement non-pharmacological measures as appropriate and evaluate response  - Consider cultural and social influences on pain and pain management  - Notify physician/advanced practitioner if interventions unsuccessful or patient reports new pain  Outcome: Progressing     Problem: INFECTION - ADULT  Goal: Absence or prevention of progression during hospitalization  Description: INTERVENTIONS:  - Assess and monitor for signs and symptoms of infection  - Monitor lab/diagnostic results  - Monitor all insertion sites, i.e. indwelling lines, tubes, and drains  - Monitor endotracheal if appropriate and nasal secretions for changes in amount and color  - [place] appropriate cooling/warming therapies per order  - Administer medications as ordered  - Instruct and encourage patient and family to use good hand hygiene technique  - Identify and instruct in appropriate isolation precautions for identified infection/condition  Outcome: Progressing  Goal: Absence of fever/infection during neutropenic period  Description: INTERVENTIONS:  - Monitor WBC    Outcome: Progressing     Problem: SAFETY ADULT  Goal: Patient will remain free of falls  Description: INTERVENTIONS:  - Educate patient/family on patient safety including physical limitations  - Instruct patient to call for assistance with activity   - Consult OT/PT to assist with strengthening/mobility   - Keep Call bell within reach  - Keep bed low and locked with side rails adjusted as appropriate  - Keep care items and personal belongings within reach  - Initiate and maintain comfort rounds  Outcome: Progressing  Goal: Maintain or return to baseline ADL function  Description: INTERVENTIONS:  -  Assess patient's ability to carry out ADLs; assess patient's baseline for ADL function and identify physical deficits which impact ability to perform ADLs (bathing, care of mouth/teeth, toileting, grooming, dressing, etc.)  - Assess/evaluate cause of self-care deficits   - Assess range of motion  - Assess patient's mobility; develop plan if impaired  - Assess patient's need for assistive devices and provide as appropriate  - Encourage maximum independence but intervene and supervise when necessary  - Involve family in performance of ADLs  - Assess for home care needs following discharge   - Consider OT consult to assist with ADL evaluation and planning for discharge  - Provide patient education as appropriate  Outcome: Progressing  Goal: Maintains/Returns to pre admission functional level  Description: INTERVENTIONS:  - Perform BMAT or MOVE assessment daily.   - Set and communicate daily mobility goal to care team and patient/family/caregiver.    - Collaborate with rehabilitation services on mobility goals if consulted  - Record patient progress and toleration of activity level   Outcome: Progressing     Problem: DISCHARGE PLANNING  Goal: Discharge to home or other facility with appropriate resources  Description: INTERVENTIONS:  - Identify barriers to discharge w/patient and caregiver  - Arrange for needed discharge resources and transportation as appropriate  - Identify discharge learning needs (meds, wound care, etc.)  - Arrange for interpretive services to assist at discharge as needed  - Refer to Case Management Department for coordinating discharge planning if the patient needs post-hospital services based on physician/advanced practitioner order or complex needs related to functional status, cognitive ability, or social support system  Outcome: Progressing     Problem: POSTPARTUM  Goal: Experiences normal postpartum course  Description: INTERVENTIONS:  - Monitor maternal vital signs  - Assess uterine involution and lochia  Outcome: Progressing  Goal: Appropriate maternal -  bonding  Description: INTERVENTIONS:  - Identify family support  - Assess for appropriate maternal/infant bonding   -Encourage maternal/infant bonding opportunities  - Referral to  or  as needed  Outcome: Progressing  Goal: Establishment of infant feeding pattern  Description: INTERVENTIONS:  - Assess breast/bottle feeding  - Refer to lactation as needed  Outcome: Progressing  Goal: Incision(s), wounds(s) or drain site(s) healing without S/S of infection  Description: INTERVENTIONS  - Assess and document dressing, incision, wound bed, drain sites and surrounding tissue  Outcome: Progressing

## 2023-10-25 VITALS
OXYGEN SATURATION: 98 % | DIASTOLIC BLOOD PRESSURE: 85 MMHG | BODY MASS INDEX: 32.96 KG/M2 | HEART RATE: 51 BPM | SYSTOLIC BLOOD PRESSURE: 135 MMHG | TEMPERATURE: 98.3 F | WEIGHT: 186 LBS | HEIGHT: 63 IN | RESPIRATION RATE: 18 BRPM

## 2023-10-25 PROCEDURE — NC001 PR NO CHARGE: Performed by: OBSTETRICS & GYNECOLOGY

## 2023-10-25 PROCEDURE — 99024 POSTOP FOLLOW-UP VISIT: CPT | Performed by: OBSTETRICS & GYNECOLOGY

## 2023-10-25 RX ORDER — POLYETHYLENE GLYCOL 3350 17 G/17G
17 POWDER, FOR SOLUTION ORAL DAILY
Refills: 0
Start: 2023-10-25

## 2023-10-25 RX ORDER — MEDROXYPROGESTERONE ACETATE 150 MG/ML
150 INJECTION, SUSPENSION INTRAMUSCULAR ONCE
Status: COMPLETED | OUTPATIENT
Start: 2023-10-25 | End: 2023-10-25

## 2023-10-25 RX ORDER — MEDROXYPROGESTERONE ACETATE 150 MG/ML
150 INJECTION, SUSPENSION INTRAMUSCULAR
Qty: 1 ML | Refills: 0 | Status: SHIPPED | OUTPATIENT
Start: 2024-01-23

## 2023-10-25 RX ORDER — ACETAMINOPHEN 325 MG/1
650 TABLET ORAL EVERY 4 HOURS PRN
Refills: 0
Start: 2023-10-25

## 2023-10-25 RX ORDER — DIAPER,BRIEF,INFANT-TODD,DISP
1 EACH MISCELLANEOUS DAILY PRN
Refills: 0
Start: 2023-10-25

## 2023-10-25 RX ORDER — IBUPROFEN 600 MG/1
600 TABLET ORAL EVERY 6 HOURS
Qty: 30 TABLET | Refills: 0 | Status: SHIPPED | OUTPATIENT
Start: 2023-10-25

## 2023-10-25 RX ADMIN — IBUPROFEN 600 MG: 600 TABLET ORAL at 11:07

## 2023-10-25 RX ADMIN — MEDROXYPROGESTERONE ACETATE 150 MG: 150 INJECTION, SUSPENSION INTRAMUSCULAR at 14:17

## 2023-10-25 RX ADMIN — IBUPROFEN 600 MG: 600 TABLET ORAL at 04:52

## 2023-10-25 RX ADMIN — ACETAMINOPHEN 650 MG: 325 TABLET, FILM COATED ORAL at 08:57

## 2023-10-25 NOTE — PROGRESS NOTES
Obstetrics Progress Note  Donato Edmond 28 y.o. female MRN: [id number]  Unit/Bed#: -01 Encounter: [id number]    Assessment/Plan:  Postpartum Day #2 s/p . Stable. Baby in room. By issue:  *  (spontaneous vaginal delivery)  Assessment & Plan  - Pain well controlled with oral analgesics  - Voiding spontaneously  - Tolerating PO fluids and solids  - Ambulating without difficulty  - Encourage breastfeeding and familial bonding, currently breast and bottle feeding  - Contraception: Desires sterilization- MA 31 signed 23, depo bridge  - Requests discharge today    Gestational hypertension  Assessment & Plan  CBC/CMP wnl; UPC: 1.85  Systolic (55EXX), CNM:168 , Min:128 , PCZ:037   Diastolic (42GET), MON:64, Min:75, Max:75  Asymptomatic  Follow up in 1 week for blood pressure check    Lack of immunity to hepatitis B virus demonstrated by serologic test  Assessment & Plan  Received 1st dose of series on 10/24/23  Plan to obtain rest of series with PCP or at pharmacy    DOMITILA II (cervical intraepithelial neoplasia II)  Assessment & Plan  History of DOMITILA 2, initially planning for LEEP prior to pregnancy  Colposcopy 23 c/w LSIL impression, no biopsies taken  Plan for LEEP postpartum      Anticipate discharge today. Subjective/Objective   Chief Complaint:   Post delivery     Subjective:   Pain: well controlled. Tolerating PO: yes. Voiding: yes. Flatus: yes. Ambulating: yes. Chest pain: no. Shortness of breath: no. Leg pain: no. Lochia: within normal limits. Infant feeding: breast/bottle.  Denies headache, vision changes, or lower extremity pain    Objective:   Vitals:   Temp:  [97.7 °F (36.5 °C)-98.2 °F (36.8 °C)] 98 °F (36.7 °C)  HR:  [56-66] 61  Resp:  [16-18] 18  BP: (126-135)/(74-88) 128/75     No intake or output data in the 24 hours ending 10/25/23 0607    Lab Results   Component Value Date    WBC 8.59 10/23/2023    HGB 11.6 10/23/2023    HCT 34.9 10/23/2023    MCV 89 10/23/2023     10/23/2023 Physical Exam:   General: alert and oriented x3, in no apparent distress  Cardiovascular: regular rate, warm and well perfused  Pulmonary: normal effort  Abdomen: Soft, non-tender, non-distended, no rebound or guarding.  Uterine fundus firm and non-tender, -1 cm below the umbilicus   Extremities: Non tender    Chris Lee MD  PGY-I, OBGYN  10/25/2023, 6:07 AM

## 2023-10-25 NOTE — LACTATION NOTE
This note was copied from a baby's chart. CONSULT - LACTATION  Baby Girl ([address] [facility]) Imagene Pace 2 days female MRN: [id number]    [address] Room / Bed:  312(N)/ 312(N) Encounter: [id number]    Maternal Information     MOTHER:  Kalyani Trinh  Maternal Age: 28 y.o.   OB History: # 1 - Date: None, Sex: None, Weight: None, GA: None, Delivery: None, Apgar1: None, Apgar5: None, Living: Living, Birth Comments: None    # 2 - Date: None, Sex: None, Weight: None, GA: None, Delivery: None, Apgar1: None, Apgar5: None, Living: Living, Birth Comments: None    # 3 - Date: 02/12/16, Sex: Female, Weight: 2353 g (5 lb 3 oz), GA: 38w5d, Delivery: Vaginal, Spontaneous, Apgar1: 9, Apgar5: 9, Living: Living, Birth Comments: None    # 4 - Date: 10/23/23, Sex: Female, Weight: 2665 g (5 lb 14 oz), GA: 38w3d, Delivery: Vaginal, Spontaneous, Apgar1: 9, Apgar5: 9, Living: Living, Birth Comments: None   Previouse breast reduction surgery? No    Lactation history:   Has patient previously breast fed: No   How long had patient previously breast fed:     Previous breast feeding complications:     History reviewed. No pertinent surgical history.      Birth information:  YOB: 2024   Time of birth: 1:46 PM   Sex: female   Delivery type: Vaginal, Spontaneous   Birth Weight: 2665 g (5 lb 14 oz)   Percent of Weight Change: -5%     Gestational Age: 36w1d   [unfilled]    Assessment        10/25/23 1000   Lactation Consultation   Reason for Consult 20 minutes   Breasts/Nipples   Date Pumping Initiated 10/24/23   Left Breast Soft   Right Breast Soft   Left Nipple Everted   Right Nipple Everted   Intervention Breast pump   Breastfeeding Status Yes   Breastfeeding Progress Not yet established;Pumping only   Other OB Lactation Tools   Feeding Devices Bottle;Pump   Breast Pump   Pump 3  (Pt will let us know what kind of pump she wants)   Patient Follow-Up   Lactation Consult Status 2   Follow-Up Type Inpatient;Call as needed   Other OB Lactation Documentation    Additional Problem Noted [place] is formula feeding and pumping. She plans to pump and then formula feed. Educ on pumping and how to cycle through. Feeding recommendations:  breast feed on demand    Mom states that she tried latching baby to breast 1x. She was set up with a pump yesterday. She has pumped a few times. She was unsure of how to cycle through the cycles. Education on pumping and how to cycle through a session. Enc to pump every 2-3 hours. Encouraged to call for help as needed. Pumping:   - When pumping, begin in stimulation mode (high cycle, low vacuum) until milk begins to express. Change pump to expression mode (low cycle, high vacuum). Use hands on pumping techniques to assist with milk transfer. When milk stops expressing, change back to stimulation mode. When milk begins to flow, change to expression mode. You may cycle pump up to three times in a pumping session. Instructions given on pumping. Discussed when to start, frequency, different pumps available versus manual expression. Encouraged parents to call for assistance, questions, and concerns about breastfeeding. Extension provided.       Dakotah Boys 10/25/2023 10:02 AM

## 2023-10-25 NOTE — PLAN OF CARE

## 2023-10-25 NOTE — PLAN OF CARE
Problem: POSTPARTUM  Goal: Experiences normal postpartum course  Description: INTERVENTIONS:  - Monitor maternal vital signs  - Assess uterine involution and lochia  10/25/2023 1430 by Natalia Coates RN  Outcome: Completed  10/25/2023 1009 by Natalia Coates RN  Outcome: Progressing  Goal: Appropriate maternal -  bonding  Description: INTERVENTIONS:  - Identify family support  - Assess for appropriate maternal/infant bonding   -Encourage maternal/infant bonding opportunities  - Referral to  or  as needed  10/25/2023 1430 by Natalia Coates RN  Outcome: Completed  10/25/2023 1009 by Natalia Coates RN  Outcome: Progressing  Goal: Establishment of infant feeding pattern  Description: INTERVENTIONS:  - Assess breast/bottle feeding  - Refer to lactation as needed  10/25/2023 1430 by Natalia Coates RN  Outcome: Completed  10/25/2023 1009 by Natalia Coates RN  Outcome: Progressing  Goal: Incision(s), wounds(s) or drain site(s) healing without S/S of infection  Description: INTERVENTIONS  - Assess and document dressing, incision, wound bed, drain sites and surrounding tissue  - Provide patient and family education  10/25/2023 1430 by Natalia Coates RN  Outcome: Completed  10/25/2023 1009 by Natalia Coates RN  Outcome: Progressing     Problem: PAIN - ADULT  Goal: Verbalizes/displays adequate comfort level or baseline comfort level  Description: Interventions:  - Encourage patient to monitor pain and request assistance  - Assess pain using appropriate pain scale  - Administer analgesics based on type and severity of pain and evaluate response  - Implement non-pharmacological measures as appropriate and evaluate response  - Consider cultural and social influences on pain and pain management  - Notify physician/advanced practitioner if interventions unsuccessful or patient reports new pain  10/25/2023 1430 by Natalia Coates RN  Outcome: Completed  10/25/2023 1009 by Dianna Calderón RN  Outcome: Progressing     Problem: INFECTION - ADULT  Goal: Absence or prevention of progression during hospitalization  Description: INTERVENTIONS:  - Assess and monitor for signs and symptoms of infection  - Monitor lab/diagnostic results  - Monitor all insertion sites, i.e. indwelling lines, tubes, and drains  - Monitor endotracheal if appropriate and nasal secretions for changes in amount and color  - [place] appropriate cooling/warming therapies per order  - Administer medications as ordered  - Instruct and encourage patient and family to use good hand hygiene technique  - Identify and instruct in appropriate isolation precautions for identified infection/condition  10/25/2023 1430 by Dianna Calderón RN  Outcome: Completed  10/25/2023 1009 by Dianna Calderón RN  Outcome: Progressing  Goal: Absence of fever/infection during neutropenic period  Description: INTERVENTIONS:  - Monitor WBC    10/25/2023 1430 by Dianna Calderón RN  Outcome: Completed  10/25/2023 1009 by Dianna Calderón RN  Outcome: Progressing     Problem: SAFETY ADULT  Goal: Patient will remain free of falls  Description: INTERVENTIONS:  - Educate patient/family on patient safety including physical limitations  - Instruct patient to call for assistance with activity   - Consult OT/PT to assist with strengthening/mobility   - Keep Call bell within reach  - Keep bed low and locked with side rails adjusted as appropriate  - Keep care items and personal belongings within reach  - Initiate and maintain comfort rounds  - Make Fall Risk Sign visible to staff  - Apply yellow socks and bracelet for high fall risk patients  - Consider moving patient to room near nurses station  10/25/2023 1430 by Dianna Calderón RN  Outcome: Completed  10/25/2023 1009 by Dianna Calderón RN  Outcome: Progressing  Goal: Maintain or return to baseline ADL function  Description: INTERVENTIONS:  - Assess patient's ability to carry out ADLs; assess patient's baseline for ADL function and identify physical deficits which impact ability to perform ADLs (bathing, care of mouth/teeth, toileting, grooming, dressing, etc.)  - Assess/evaluate cause of self-care deficits   - Assess range of motion  - Assess patient's mobility; develop plan if impaired  - Assess patient's need for assistive devices and provide as appropriate  - Encourage maximum independence but intervene and supervise when necessary  - Involve family in performance of ADLs  - Assess for home care needs following discharge   - Consider OT consult to assist with ADL evaluation and planning for discharge  - Provide patient education as appropriate  10/25/2023 1430 by Yoel Guzman RN  Outcome: Completed  10/25/2023 1009 by Yoel Guzman RN  Outcome: Progressing  Goal: Maintains/Returns to pre admission functional level  Description: INTERVENTIONS:  - Perform BMAT or MOVE assessment daily.   - Set and communicate daily mobility goal to care team and patient/family/caregiver.    - Collaborate with rehabilitation services on mobility goals if consulted  - Out of bed for toileting  - Record patient progress and toleration of activity level   10/25/2023 1430 by Yoel Guzman RN  Outcome: Completed  10/25/2023 1009 by Yoel Guzman RN  Outcome: Progressing     Problem: DISCHARGE PLANNING  Goal: Discharge to home or other facility with appropriate resources  Description: INTERVENTIONS:  - Identify barriers to discharge w/patient and caregiver  - Arrange for needed discharge resources and transportation as appropriate  - Identify discharge learning needs (meds, wound care, etc.)  - Arrange for interpretive services to assist at discharge as needed  - Refer to Case Management Department for coordinating discharge planning if the patient needs post-hospital services based on physician/advanced practitioner order or complex needs related to functional status, cognitive ability, or social support system  10/25/2023 1430 by George Mendoza, RN  Outcome: Completed  10/25/2023 1009 by George Mendoza, RN  Outcome: Progressing

## 2023-10-25 NOTE — PLAN OF CARE
Problem: PAIN - ADULT  Goal: Verbalizes/displays adequate comfort level or baseline comfort level  Description: Interventions:  - Encourage patient to monitor pain and request assistance  - Assess pain using appropriate pain scale  - Administer analgesics based on type and severity of pain and evaluate response  - Implement non-pharmacological measures as appropriate and evaluate response  - Consider cultural and social influences on pain and pain management  - Notify physician/advanced practitioner if interventions unsuccessful or patient reports new pain  Outcome: Progressing     Problem: INFECTION - ADULT  Goal: Absence or prevention of progression during hospitalization  Description: INTERVENTIONS:  - Assess and monitor for signs and symptoms of infection  - Monitor lab/diagnostic results  - Monitor all insertion sites, i.e. indwelling lines, tubes, and drains  - Monitor endotracheal if appropriate and nasal secretions for changes in amount and color  - [place] appropriate cooling/warming therapies per order  - Administer medications as ordered  - Instruct and encourage patient and family to use good hand hygiene technique  - Identify and instruct in appropriate isolation precautions for identified infection/condition  Outcome: Progressing  Goal: Absence of fever/infection during neutropenic period  Description: INTERVENTIONS:  - Monitor WBC    Outcome: Progressing     Problem: SAFETY ADULT  Goal: Patient will remain free of falls  Description: INTERVENTIONS:  - Educate patient/family on patient safety including physical limitations  - Instruct patient to call for assistance with activity   - Consult OT/PT to assist with strengthening/mobility   - Keep Call bell within reach  - Keep bed low and locked with side rails adjusted as appropriate  - Keep care items and personal belongings within reach  - Initiate and maintain comfort rounds  - Make Fall Risk Sign visible to staff  - Offer Toileting every  Hours, in advance of need  - Initiate/Maintain alarm  - Obtain necessary fall risk management equipment:   - Apply yellow socks and bracelet for high fall risk patients  - Consider moving patient to room near nurses station  Outcome: Progressing  Goal: Maintain or return to baseline ADL function  Description: INTERVENTIONS:  -  Assess patient's ability to carry out ADLs; assess patient's baseline for ADL function and identify physical deficits which impact ability to perform ADLs (bathing, care of mouth/teeth, toileting, grooming, dressing, etc.)  - Assess/evaluate cause of self-care deficits   - Assess range of motion  - Assess patient's mobility; develop plan if impaired  - Assess patient's need for assistive devices and provide as appropriate  - Encourage maximum independence but intervene and supervise when necessary  - Involve family in performance of ADLs  - Assess for home care needs following discharge   - Consider OT consult to assist with ADL evaluation and planning for discharge  - Provide patient education as appropriate  Outcome: Progressing  Goal: Maintains/Returns to pre admission functional level  Description: INTERVENTIONS:  - Perform BMAT or MOVE assessment daily.   - Set and communicate daily mobility goal to care team and patient/family/caregiver. - Collaborate with rehabilitation services on mobility goals if consulted  - Perform Range of Motion times a day. - Reposition patient every  hours.   - Dangle patient  times a day  - Stand patient  times a day  - Ambulate patient  times a day  - Out of bed to chair  times a day   - Out of bed for meals  times a day  - Out of bed for toileting  - Record patient progress and toleration of activity level   Outcome: Progressing     Problem: DISCHARGE PLANNING  Goal: Discharge to home or other facility with appropriate resources  Description: INTERVENTIONS:  - Identify barriers to discharge w/patient and caregiver  - Arrange for needed discharge resources and transportation as appropriate  - Identify discharge learning needs (meds, wound care, etc.)  - Arrange for interpretive services to assist at discharge as needed  - Refer to Case Management Department for coordinating discharge planning if the patient needs post-hospital services based on physician/advanced practitioner order or complex needs related to functional status, cognitive ability, or social support system  Outcome: Progressing     Problem: POSTPARTUM  Goal: Experiences normal postpartum course  Description: INTERVENTIONS:  - Monitor maternal vital signs  - Assess uterine involution and lochia  Outcome: Progressing  Goal: Appropriate maternal -  bonding  Description: INTERVENTIONS:  - Identify family support  - Assess for appropriate maternal/infant bonding   -Encourage maternal/infant bonding opportunities  - Referral to  or  as needed  Outcome: Progressing  Goal: Establishment of infant feeding pattern  Description: INTERVENTIONS:  - Assess breast/bottle feeding  - Refer to lactation as needed  Outcome: Progressing  Goal: Incision(s), wounds(s) or drain site(s) healing without S/S of infection  Description: INTERVENTIONS  - Assess and document dressing, incision, wound bed, drain sites and surrounding tissue  - Provide patient and family education  - Perform skin care/dressing changes every  Outcome: Progressing

## 2023-10-26 PROCEDURE — 88307 TISSUE EXAM BY PATHOLOGIST: CPT | Performed by: PATHOLOGY

## 2023-10-26 NOTE — UTILIZATION REVIEW
NOTIFICATION OF INPATIENT ADMISSION   MATERNITY/DELIVERY AUTHORIZATION REQUEST   SERVICING FACILITY:   [address] [place] [address] - L&D, , NICU  [facility], [address] [place] [address]  Tax ID: [id number]  NPI: [id number]   ATTENDING PROVIDER:  Attending Name and NPI#: Tamar Clay [place] [[id number]]  Address: [address] [place] [address], [address] [place] [address]  Phone: [phone]   ADMISSION INFORMATION:  Place of Service: [facility]patient [address] [facility]  Place of Service Code: 21  Inpatient Admission Date/Time: 10/23/23  2:45 AM  Discharge Date/Time: 10/25/2023  3:15 PM  Admitting Diagnosis Code/Description:  Pregnancy [Z34.90]  38 weeks gestation of pregnancy [Z3A.38]  Uterine contractions [O47.9]  Encounter for full-term uncomplicated delivery [Q24]     Mother: Velia Leonard 1990 Estimated Date of Delivery: 11/3/23  Delivering clinician: Tamar Clay    OB History          4    Para   4    Term   4            AB        Living   4         SAB        IAB        Ectopic        Multiple   0    Live Births   4               [place] Name & MRN:   Information for the patient's :  Deirdre Borjas Girl [facility]) [[id number]]   [place] Delivery Information:  Sex: female  Delivered 10/23/2023 1:46 PM by Vaginal, Spontaneous; Gestational Age: 36w1d    [place] Measurements:  Weight: 5 lb 14 oz (2665 g); Height: 18.5"    APGAR 1 minute 5 minutes 10 minutes   Totals: 9 9       Birth Information: 28 y.o. female MRN: [id number] Unit/Bed#: -01   Birthweight: No birth weight on file. Gestational Age: <None> Delivery Type:    APGARS Totals:        UTILIZATION REVIEW CONTACT:  Michelle Nichols Utilization   Network Utilization Review Department  Phone: [phone]  Fax [phone]  Email: [email or web address]  Contact for approvals/pending authorizations, clinical reviews, and discharge.      PHYSICIAN ADVISORY SERVICES:  Medical Necessity Denial & Lzjo-ej-Wcgw Review  Phone: [phone]  Fax: [phone]  Email: [email or web address]     DISCHARGE SUPPORT TEAM:  For Patients Discharge Needs & Updates  Phone: [phone] opt. 2 Fax: [phone]  Email: [email or web address]

## 2023-10-31 ENCOUNTER — TELEPHONE (OUTPATIENT)
Dept: OBGYN CLINIC | Facility: CLINIC | Age: 33
End: 2023-10-31

## 2023-10-31 NOTE — TELEPHONE ENCOUNTER
Transition of Care Post Partum phone call: Congratulations.     Date of delivery: 2023  Weeks gestation: full term 38 weeks  Type of delivery: vaginal delivery  Sex of child: female  Name of child:   Birth weight: 2665 gm   5lbs 14ounces  Perineum laceration: No   How are you feeling: good  Vaginal bleeding status: light  Urinary status: none  Bowel movement: No  Incision status: NA   Pain control: none   feeding: both   Any baby blues: No  Scheduled for follow-up appointment: 2023

## 2023-11-13 PROBLEM — O99.891 BACK PAIN COMPLICATING PREGNANCY: Status: RESOLVED | Noted: 2023-10-11 | Resolved: 2023-11-13

## 2023-11-13 PROBLEM — O13.9 GESTATIONAL HYPERTENSION: Chronic | Status: RESOLVED | Noted: 2023-10-23 | Resolved: 2023-11-13

## 2023-11-13 PROBLEM — O36.5930 INTRAUTERINE GROWTH RESTRICTION AFFECTING ANTEPARTUM CARE OF MOTHER IN THIRD TRIMESTER: Status: RESOLVED | Noted: 2023-09-29 | Resolved: 2023-11-13

## 2023-11-13 PROBLEM — Z72.51 HIGH RISK HETEROSEXUAL BEHAVIOR: Status: RESOLVED | Noted: 2023-01-24 | Resolved: 2023-11-13

## 2023-11-13 PROBLEM — M54.9 BACK PAIN COMPLICATING PREGNANCY: Status: RESOLVED | Noted: 2023-10-11 | Resolved: 2023-11-13

## 2023-11-13 PROBLEM — Z23 NEED FOR VACCINATION: Status: RESOLVED | Noted: 2023-09-21 | Resolved: 2023-11-13

## 2023-11-13 PROBLEM — O99.210 OBESITY AFFECTING PREGNANCY: Status: RESOLVED | Noted: 2023-04-25 | Resolved: 2023-11-13

## 2023-11-13 PROBLEM — O47.9 UTERINE CONTRACTIONS: Status: RESOLVED | Noted: 2023-10-23 | Resolved: 2023-11-13

## 2023-11-13 PROBLEM — Z34.90 ENCOUNTER FOR INDUCTION OF LABOR: Status: RESOLVED | Noted: 2023-10-23 | Resolved: 2023-11-13

## 2023-11-13 PROBLEM — O21.9 NAUSEA AND VOMITING IN PREGNANCY: Status: RESOLVED | Noted: 2023-03-29 | Resolved: 2023-11-13

## 2023-12-07 ENCOUNTER — POSTPARTUM VISIT (OUTPATIENT)
Dept: OBGYN CLINIC | Facility: CLINIC | Age: 33
End: 2023-12-07

## 2023-12-07 VITALS
SYSTOLIC BLOOD PRESSURE: 125 MMHG | WEIGHT: 166 LBS | RESPIRATION RATE: 18 BRPM | BODY MASS INDEX: 29.41 KG/M2 | HEIGHT: 63 IN | DIASTOLIC BLOOD PRESSURE: 87 MMHG | HEART RATE: 80 BPM

## 2023-12-07 NOTE — PROGRESS NOTES
OB/GYN- Postpartum Visit   Chemo Rhodes   [unfilled]   There are other unrelated non-urgent complaints, but due to the busy schedule and the amount of time I've already spent with her, time does not permit me to address these routine issues at today's visit. I've requested another appointment to review these additional issues. Assessment/Plan:   Dimitrios Campos is now a  now 6 weeks postpartum from [facility], doing well. Problem List Items Addressed This Visit       Postpartum state - Primary     Patient is doing well 6 weeks postpartum   No changes in the breast: erythema, discharge, warmth, pain  EDPS score 0 . Mood is stable   Lochia WNL   Breast and bottle feeding  Appropriate bowel and bladder function  She has resumed sexual intercourse - using depo for contraception    Plan to come back next week to discuss having salpingectomy and LEEP (secondary to DOMITILA II). Subjective:    Dmiitrios Campos presents for her post partum visit. She delivered on 10/23 by [facility] at 38w3d weeks gestation. I have fully reviewed the prenatal and intrapartum course. Her pregnancy was complicated by FGR, DOMITILA II, gHTN. The baby girl weighed 5 lbs 14 oz and is doing well. Baby is feeding by both breast and bottle - Similac Advance. The patient currently has no bleeding. Patient has resumed sexually active. Contraceptive plan is depo as a bridge to sterilization. MA-31 signed in September. Postpartum depression screening: negative. Patient reports no additional complaints. Patient's medications, allergies, past medical, surgical, social and family histories were reviewed and updated as appropriate. Review of Systems  Review of Systems   Constitutional:  Negative for chills and fever. Eyes:  Negative for visual disturbance. Respiratory:  Negative for shortness of breath. Cardiovascular:  Negative for chest pain.    Gastrointestinal:  Negative for abdominal pain, nausea and vomiting. Genitourinary:  Negative for vaginal bleeding. Musculoskeletal: Negative. Neurological: Negative. Objective:    Vitals:    12/07/23 0950   BP: 125/87   Pulse: 80   Resp: 18       Physical Exam  Constitutional:       Appearance: Normal appearance. Genitourinary:      Genitourinary Comments: deferred   HENT:      Head: Normocephalic and atraumatic. Cardiovascular:      Rate and Rhythm: Normal rate. Pulmonary:      Effort: Pulmonary effort is normal.   Musculoskeletal:      Cervical back: Normal range of motion. Right lower leg: No edema. Left lower leg: No edema. Neurological:      Mental Status: She is alert.            Eri Padilla MD  OBGYN PGY-4  10:13 AM  12/7/2023

## 2023-12-07 NOTE — LETTER
December 7, 2023     Patient: Brielle Thompson  YOB: 1990  Date of Visit: 12/7/2023      To Whom it May Concern:    Brielle Thompson is under my professional care. Sky Foots was seen in my office on 12/7/2023. Sky Nieves may return to work on 12/11/23 . If you have any questions or concerns, please don't hesitate to call.          Sincerely,          Christiano James MD        CC: No Recipients

## 2023-12-07 NOTE — ASSESSMENT & PLAN NOTE
Patient is doing well 6 weeks postpartum   No changes in the breast: erythema, discharge, warmth, pain  EDPS score 0 . Mood is stable   Lochia WNL   Breast and bottle feeding  Appropriate bowel and bladder function  She has resumed sexual intercourse - using depo for contraception    Plan to come back next week to discuss having salpingectomy and LEEP (secondary to DOMITILA II).

## 2024-10-01 ENCOUNTER — HOSPITAL ENCOUNTER (EMERGENCY)
Facility: HOSPITAL | Age: 34
Discharge: HOME | End: 2024-10-01
Attending: EMERGENCY MEDICINE
Payer: COMMERCIAL

## 2024-10-01 VITALS
HEART RATE: 78 BPM | OXYGEN SATURATION: 98 % | BODY MASS INDEX: 29.23 KG/M2 | HEIGHT: 63 IN | TEMPERATURE: 98.1 F | DIASTOLIC BLOOD PRESSURE: 77 MMHG | WEIGHT: 165 LBS | RESPIRATION RATE: 18 BRPM | SYSTOLIC BLOOD PRESSURE: 151 MMHG

## 2024-10-01 DIAGNOSIS — O23.40 UTI IN PREGNANCY: Primary | ICD-10-CM

## 2024-10-01 LAB
AMORPH URATE CRY URNS QL MICRO: ABNORMAL
BACTERIA UR QL AUTO: ABNORMAL /HPF
BILIRUB UR QL STRIP: NEGATIVE
CLARITY UR: ABNORMAL
COLOR UR: YELLOW
EXT PREGNANCY TEST URINE: POSITIVE
EXT. CONTROL: ABNORMAL
GLUCOSE UR STRIP-MCNC: NEGATIVE MG/DL
HGB UR QL STRIP.AUTO: ABNORMAL
KETONES UR STRIP-MCNC: NEGATIVE MG/DL
LEUKOCYTE ESTERASE UR QL STRIP: ABNORMAL
MUCOUS THREADS UR QL AUTO: ABNORMAL
NITRITE UR QL STRIP: NEGATIVE
NON-SQ EPI CELLS URNS QL MICRO: ABNORMAL /HPF
PH UR STRIP.AUTO: 6 [PH]
PROT UR STRIP-MCNC: ABNORMAL MG/DL
RBC #/AREA URNS AUTO: ABNORMAL /HPF
SP GR UR STRIP.AUTO: 1.02 (ref 1–1.03)
UROBILINOGEN UR STRIP-ACNC: 2 MG/DL
WBC #/AREA URNS AUTO: ABNORMAL /HPF

## 2024-10-01 PROCEDURE — 87086 URINE CULTURE/COLONY COUNT: CPT | Performed by: EMERGENCY MEDICINE

## 2024-10-01 PROCEDURE — 81025 URINE PREGNANCY TEST: CPT | Performed by: EMERGENCY MEDICINE

## 2024-10-01 PROCEDURE — 81001 URINALYSIS AUTO W/SCOPE: CPT | Performed by: EMERGENCY MEDICINE

## 2024-10-01 PROCEDURE — 99284 EMERGENCY DEPT VISIT MOD MDM: CPT | Performed by: EMERGENCY MEDICINE

## 2024-10-01 PROCEDURE — 99283 EMERGENCY DEPT VISIT LOW MDM: CPT

## 2024-10-01 RX ORDER — CEPHALEXIN 500 MG/1
500 CAPSULE ORAL EVERY 12 HOURS SCHEDULED
Qty: 10 CAPSULE | Refills: 0 | Status: SHIPPED | OUTPATIENT
Start: 2024-10-01 | End: 2024-10-06

## 2024-10-01 RX ORDER — CEPHALEXIN 500 MG/1
500 CAPSULE ORAL ONCE
Status: COMPLETED | OUTPATIENT
Start: 2024-10-01 | End: 2024-10-01

## 2024-10-01 RX ORDER — FAMOTIDINE 20 MG
1 TABLET ORAL DAILY
Qty: 30 TABLET | Refills: 0 | Status: SHIPPED | OUTPATIENT
Start: 2024-10-01 | End: 2024-10-03 | Stop reason: SDUPTHER

## 2024-10-01 RX ADMIN — CEPHALEXIN 500 MG: 500 CAPSULE ORAL at 12:41

## 2024-10-01 NOTE — ED PROVIDER NOTES
Final diagnoses:   UTI in pregnancy     ED Disposition       ED Disposition   Discharge    Condition   Stable    Date/Time   Tue Oct 1, 2024 12:51 PM    Comment   Kalyani Trinh discharge to home/self care.                   Assessment & Plan       Medical Decision Making  Patient's UA was consistent for UTI.  Patient tested positive for urine pregnancy.  Patient has no other symptoms at this time.  Patient has an appointment coming up with her OB/GYN. Patient denies any abdominal pain, vaginal bleeding or discharge.  Patient denies any dysuria or increased urinary frequency.  Patient denies any flank pain.  Close return instructions given to return to the ER for any worsening symptoms.    At this time patient started on empiric antibiotic as well as prenatal vitamins.  Patient discharged with follow-up to PCP as well as OB/GYN. Patient agrees with discharge plan.  Patient well appearing at time of discharge.    Please Note: Fluency Direct voice recognition software may have been used in the creation of this document. Wrong words or sound a like substitutions may have occurred due to the inherent limitations of the voice software.         Amount and/or Complexity of Data Reviewed  Labs: ordered. Decision-making details documented in ED Course.    Risk  OTC drugs.  Prescription drug management.             Medications   cephalexin (KEFLEX) capsule 500 mg (500 mg Oral Given 10/1/24 1241)       ED Risk Strat Scores                           SBIRT 20yo+      Flowsheet Row Most Recent Value   Initial Alcohol Screen: US AUDIT-C     1. How often do you have a drink containing alcohol? 0 Filed at: 10/01/2024 1122   2. How many drinks containing alcohol do you have on a typical day you are drinking?  0 Filed at: 10/01/2024 1122   3a. Male UNDER 65: How often do you have five or more drinks on one occasion? 0 Filed at: 10/01/2024 1122   3b. FEMALE Any Age, or MALE 65+: How often do you have 4 or more drinks on one  "occassion? 0 Filed at: 10/01/2024 1122   Audit-C Score 0 Filed at: 10/01/2024 1122   ODELL: How many times in the past year have you...    Used an illegal drug or used a prescription medication for non-medical reasons? Never Filed at: 10/01/2024 1122                            History of Present Illness       Chief Complaint   Patient presents with    Possible UTI     States she thinks her urine is cloudy and \"there's also an itch, and oh I would like to confirm my at home pregnancy test\"       Past Medical History:   Diagnosis Date    38 weeks gestation of pregnancy 2016    Abnormal Pap smear of cervix     Asthma     DOMITILA II (cervical intraepithelial neoplasia II)      (spontaneous vaginal delivery) 2016    UTI in pregnancy       History reviewed. No pertinent surgical history.   Family History   Problem Relation Age of Onset    Hypertension Mother     No Known Problems Sister     No Known Problems Sister     No Known Problems Sister     Spina bifida Brother     No Known Problems Brother     No Known Problems Daughter     No Known Problems Daughter     No Known Problems Daughter     Hypertension Maternal Grandmother     Deep vein thrombosis Paternal Grandmother     Breast cancer Neg Hx     Colon cancer Neg Hx     Ovarian cancer Neg Hx       Social History     Tobacco Use    Smoking status: Former     Current packs/day: 0.20     Types: Cigarettes    Smokeless tobacco: Never    Tobacco comments:     4 ciggs a day   Vaping Use    Vaping status: Former   Substance Use Topics    Alcohol use: Never     Alcohol/week: 1.0 standard drink of alcohol     Types: 1 Glasses of wine per week     Comment: occasionaly    Drug use: No      E-Cigarette/Vaping    E-Cigarette Use Former User     Comments quit when she found out she was pregnant       E-Cigarette/Vaping Substances    Nicotine No     THC No     CBD No     Flavoring No     Other No     Unknown No       I have reviewed and agree with the history as documented. "     34-year-old G5, P4 female presents to the ED for evaluation of possible UTI as well as confirmation of her home pregnancy test.  Patient states that her menstrual cycles are irregular.  Patient cannot specifically recall when her menstrual period was.  Patient started to have some nausea, vomiting, and increased fatigue over the past few weeks.  1.5 weeks ago patient did a home pregnancy test and was positive.  Patient has an appointment coming up with OB/GYN in the near future.  Patient noticed cloudy and dark-colored urine since yesterday.  Patient became concerned that she may have a UTI.  Subsequently patient came to the ED for further evaluation.  Patient denies any fevers or chills.  Patient denies any abdominal pain or vaginal discharge.  Patient denies any dysuria or increased urinary frequency.      History provided by:  Patient      Review of Systems   Constitutional:  Negative for chills and fever.   HENT:  Negative for ear pain and sore throat.    Eyes:  Negative for pain and visual disturbance.   Respiratory:  Negative for cough and shortness of breath.    Cardiovascular:  Negative for chest pain and palpitations.   Gastrointestinal:  Negative for abdominal pain and vomiting.   Genitourinary:  Negative for dysuria and hematuria.        Cloudy urine   Musculoskeletal:  Negative for arthralgias and back pain.   Skin:  Negative for color change and rash.   Neurological:  Negative for seizures and syncope.   All other systems reviewed and are negative.          Objective       ED Triage Vitals   Temperature Pulse Blood Pressure Respirations SpO2 Patient Position - Orthostatic VS   10/01/24 1122 10/01/24 1124 10/01/24 1124 10/01/24 1122 10/01/24 1124 --   98.1 °F (36.7 °C) 78 151/77 18 98 %       Temp src Heart Rate Source BP Location FiO2 (%) Pain Score    -- -- -- -- 10/01/24 1122        No Pain      Vitals      Date and Time Temp Pulse SpO2 Resp BP Pain Score FACES Pain Rating User   10/01/24 1124 --  78 98 % -- 151/77 -- -- DORIS   10/01/24 1122 98.1 °F (36.7 °C) -- -- 18 -- No Pain -- DORIS            Physical Exam  Vitals and nursing note reviewed.   Constitutional:       General: She is not in acute distress.     Appearance: She is well-developed.   HENT:      Head: Normocephalic and atraumatic.   Eyes:      Conjunctiva/sclera: Conjunctivae normal.   Cardiovascular:      Rate and Rhythm: Normal rate and regular rhythm.      Heart sounds: No murmur heard.  Pulmonary:      Effort: Pulmonary effort is normal. No respiratory distress.      Breath sounds: Normal breath sounds.   Abdominal:      General: Abdomen is flat. Bowel sounds are normal. There is no distension.      Palpations: Abdomen is soft.      Tenderness: There is no abdominal tenderness.      Comments: Abdomen is soft, nondistended, with bowel sound present all 4 quadrants.  No tenderness noted to palpation of abdomen.   Musculoskeletal:         General: No swelling.      Cervical back: Neck supple.   Skin:     General: Skin is warm and dry.      Capillary Refill: Capillary refill takes less than 2 seconds.   Neurological:      Mental Status: She is alert.   Psychiatric:         Mood and Affect: Mood normal.         Results Reviewed       Procedure Component Value Units Date/Time    Urine Microscopic [[id number]]  (Abnormal) Collected: 10/01/24 1148    Lab Status: Final result Specimen: Urine, Other Updated: 10/01/24 1225     RBC, UA 4-10 /hpf      WBC, UA 20-30 /hpf      Epithelial Cells Innumerable /hpf      Bacteria, UA Moderate /hpf      MUCUS THREADS Moderate     Amorphous Crystals, UA Moderate    Urine culture [[id number]] Collected: 10/01/24 1148    Lab Status: In process Specimen: Urine, Other Updated: 10/01/24 1225    UA w Reflex to Microscopic w Reflex to Culture [[id number]]  (Abnormal) Collected: 10/01/24 1148    Lab Status: Final result Specimen: Urine, Other Updated: 10/01/24 1216     Color, UA Yellow     Clarity, UA Slightly Cloudy     Specific  Gravity, UA 1.020     pH, UA 6.0     Leukocytes, UA Large     Nitrite, UA Negative     Protein, UA Trace mg/dl      Glucose, UA Negative mg/dl      Ketones, UA Negative mg/dl      Urobilinogen, UA 2.0 mg/dl      Bilirubin, UA Negative     Occult Blood, UA Small    POCT pregnancy, urine [[id number]]  (Abnormal) Resulted: 10/01/24 1209    Lab Status: Final result Updated: 10/01/24 1209     EXT Preg Test, Ur Positive     Control Valid            No orders to display       Procedures    ED Medication and Procedure Management   Prior to Admission Medications   Prescriptions Last Dose Informant Patient Reported? Taking?   Prenatal Vit-Fe Fumarate-FA (Prenatal Vitamin) 27-0.8 MG TABS  Self No No   Sig: Take 1 tablet by mouth in the morning   Patient not taking: Reported on 12/7/2023   acetaminophen (TYLENOL) 325 mg tablet  Self No No   Sig: Take 2 tablets (650 mg total) by mouth every 4 (four) hours as needed for mild pain   Patient not taking: Reported on 12/7/2023   benzocaine-menthol-lanolin-aloe (DERMOPLAST) 20-0.5 % topical spray  Self No No   Sig: Apply 1 Application topically every 6 (six) hours as needed for mild pain or irritation   Patient not taking: Reported on 12/7/2023   hydrocortisone 1 % cream  Self No No   Sig: Apply 1 Application topically daily as needed for irritation   Patient not taking: Reported on 12/7/2023   ibuprofen (MOTRIN) 600 mg tablet  Self No No   Sig: Take 1 tablet (600 mg total) by mouth every 6 (six) hours   Patient not taking: Reported on 12/7/2023   medroxyPROGESTERone (DEPO-PROVERA) 150 mg/mL injection  Self No No   Sig: Inject 1 mL (150 mg total) into a muscle every 3 (three) months Do not start before January 23, 2024.   polyethylene glycol (MIRALAX) 17 g packet  Self No No   Sig: Take 17 g by mouth daily   Patient not taking: Reported on 12/7/2023   polyethylene glycol (MIRALAX) 17 g packet  Self No No   Sig: Take 17 g by mouth daily   Patient not taking: Reported on 12/7/2023    witch hazel-glycerin (TUCKS) topical pad  Self No No   Sig: Apply 1 Pad topically every 4 (four) hours as needed for irritation   Patient not taking: Reported on 12/7/2023      Facility-Administered Medications: None     Discharge Medication List as of 10/1/2024 12:52 PM        START taking these medications    Details   cephalexin (KEFLEX) 500 mg capsule Take 1 capsule (500 mg total) by mouth every 12 (twelve) hours for 5 days, Starting Tue 10/1/2024, Until Sun 10/6/2024, Normal      Prenatal Vit-Fe Fumarate-FA (Prenatal Complete) 14-0.4 MG TABS Take 1 tablet by mouth in the morning, Starting Tue 10/1/2024, Normal           CONTINUE these medications which have NOT CHANGED    Details   acetaminophen (TYLENOL) 325 mg tablet Take 2 tablets (650 mg total) by mouth every 4 (four) hours as needed for mild pain, Starting Wed 10/25/2023, No Print      benzocaine-menthol-lanolin-aloe (DERMOPLAST) 20-0.5 % topical spray Apply 1 Application topically every 6 (six) hours as needed for mild pain or irritation, Starting Wed 10/25/2023, No Print      hydrocortisone 1 % cream Apply 1 Application topically daily as needed for irritation, Starting Wed 10/25/2023, No Print      ibuprofen (MOTRIN) 600 mg tablet Take 1 tablet (600 mg total) by mouth every 6 (six) hours, Starting Wed 10/25/2023, Normal      medroxyPROGESTERone (DEPO-PROVERA) 150 mg/mL injection Inject 1 mL (150 mg total) into a muscle every 3 (three) months Do not start before January 23, 2024., Starting Tue 1/23/2024, Normal      !! polyethylene glycol (MIRALAX) 17 g packet Take 17 g by mouth daily, Starting Wed 10/25/2023, No Print      !! polyethylene glycol (MIRALAX) 17 g packet Take 17 g by mouth daily, Starting Wed 10/25/2023, No Print      Prenatal Vit-Fe Fumarate-FA (Prenatal Vitamin) 27-0.8 MG TABS Take 1 tablet by mouth in the morning, Starting Tue 5/16/2023, Normal      witch hazel-glycerin (TUCKS) topical pad Apply 1 Pad topically every 4 (four) hours as  needed for irritation, Starting Wed 10/25/2023, No Print       !! - Potential duplicate medications found. Please discuss with provider.        No discharge procedures on file.  ED SEPSIS DOCUMENTATION   Time reflects when diagnosis was documented in both MDM as applicable and the Disposition within this note       Time User Action Codes Description Comment    10/1/2024 12:51 PM Selene London Add [O23.40] UTI in pregnancy                  Selene London,   10/01/24 1349

## 2024-10-02 LAB — BACTERIA UR CULT: NORMAL

## 2024-10-02 NOTE — PROGRESS NOTES
[facility]   ULTRASOUND VISIT     SUBJECTIVE:  Brief HPI: Kalyani Trinh is a 34 y.o.  female who presents for viability scan and dating for new pregnancy.   Reports unknown LMP given irregular periods   This pregnancy is unplanned, but desired.   Current symptoms: nausea and vomiting    OB History    Para Term  AB Living   4 4 4     4   SAB IAB Ectopic Multiple Live Births         0 4      # Outcome Date GA Lbr Warren/2nd Weight Sex Type Anes PTL Lv   4 Term 10/23/23 38w3d / 00:23 2665 g (5 lb 14 oz) F Vag-Spont EPI N MARTIN   3 Term 16 38w5d 04:35 / 00:09 2353 g (5 lb 3 oz) F Vag-Spont None N MARTIN   2 Term         MARTIN   1 Term         MARTIN       Past Medical History:   Diagnosis Date    38 weeks gestation of pregnancy 2016    Abnormal Pap smear of cervix     Asthma     DOMITILA II (cervical intraepithelial neoplasia II)      (spontaneous vaginal delivery) 2016    UTI in pregnancy        History reviewed. No pertinent surgical history.    Review of Systems   Constitutional:  Negative for chills and fever.   Respiratory:  Negative for cough and shortness of breath.    Cardiovascular:  Negative for chest pain and leg swelling.   Gastrointestinal:  Negative for abdominal pain, nausea and vomiting.   Genitourinary:  Negative for dysuria, pelvic pain, urgency, vaginal bleeding and vaginal discharge.   Neurological:  Negative for dizziness, light-headedness and headaches.   All other systems reviewed and are negative.      OBJECTIVE:  Vitals:    10/03/24 1429   BP: 96/64   Pulse: 69   Resp: 18      Physical Exam  Vitals and nursing note reviewed.   Constitutional:       General: She is not in acute distress.     Appearance: She is well-developed.   HENT:      Head: Normocephalic and atraumatic.   Eyes:      Conjunctiva/sclera: Conjunctivae normal.   Cardiovascular:      Rate and Rhythm: Normal rate and regular rhythm.      Heart sounds: No murmur  heard.  Pulmonary:      Effort: Pulmonary effort is normal. No respiratory distress.      Breath sounds: Normal breath sounds.   Abdominal:      Palpations: Abdomen is soft.      Tenderness: There is no abdominal tenderness.   Musculoskeletal:         General: No swelling.      Cervical back: Neck supple.   Skin:     General: Skin is warm and dry.      Capillary Refill: Capillary refill takes less than 2 seconds.   Neurological:      Mental Status: She is alert.   Psychiatric:         Mood and Affect: Mood normal.         FIRST TRIMESTER OBSTETRIC ULTRASOUND  INDICATION: Amenorrhea, viability  COMPARISON: None.  TECHNIQUE:   Transvaginal imaging was performed to assess the gestation, myometrial/endometrial architecture and ovarian parenchymal detail.    The study includes volumetric sweeps and traditional still imaging technique.      FINDINGS:  UTERUS:  No free fluid identified.   A single intrauterine gestation is identified.  Cervix appears normal.     GESTATIONAL SAC: Round, normal in appearance.   AMNIOTIC FLUID/SAC SHAPE: Within expected normal range.    FETAL POLE:  CRL:     17.1 mm = 8w1d  16.1 mm = 8w0d  18.2 mm = 8w2d    Cardiac activity is detected at 172 bpm.     YOLK SAC:   Round, normal in appearance.    ADNEXA:   Left ovary:   No adnexal mass or pathologic cyst.      Right ovary:  No adnexal mass or pathologic cyst.     IMPRESSION:  Single, viable IUP measuring 8w1d by this ultrasound.   Fetal cardiac activity present and WNL.  Uterus and adnexa normal in appearance.    ASSESSMENT/PLAN:  IUP at 8w1d  - Will use TVUS as final method of dating.  - RTC for nursing intake and prenatal H&P  - Prenatal labs ordered  - Prenatal vitamin ordered  - MFM referral ordered   -Zofran ordered    Maribel Sykes MD  PGY-3, OBGYN  10/03/24

## 2024-10-03 ENCOUNTER — ULTRASOUND (OUTPATIENT)
Dept: OBGYN CLINIC | Facility: CLINIC | Age: 34
End: 2024-10-03

## 2024-10-03 VITALS
WEIGHT: 204 LBS | BODY MASS INDEX: 36.14 KG/M2 | RESPIRATION RATE: 18 BRPM | DIASTOLIC BLOOD PRESSURE: 64 MMHG | HEART RATE: 69 BPM | HEIGHT: 63 IN | SYSTOLIC BLOOD PRESSURE: 96 MMHG

## 2024-10-03 DIAGNOSIS — Z3A.15 15 WEEKS GESTATION OF PREGNANCY: ICD-10-CM

## 2024-10-03 DIAGNOSIS — Z3A.08 8 WEEKS GESTATION OF PREGNANCY: Primary | ICD-10-CM

## 2024-10-03 DIAGNOSIS — Z32.01 POSITIVE PREGNANCY TEST: ICD-10-CM

## 2024-10-03 DIAGNOSIS — O23.40 UTI IN PREGNANCY: ICD-10-CM

## 2024-10-03 RX ORDER — ONDANSETRON 4 MG/1
4 TABLET, FILM COATED ORAL EVERY 8 HOURS PRN
Qty: 20 TABLET | Refills: 0 | Status: SHIPPED | OUTPATIENT
Start: 2024-10-03 | End: 2024-12-02

## 2024-10-03 RX ORDER — FAMOTIDINE 20 MG
1 TABLET ORAL DAILY
Qty: 180 TABLET | Refills: 0 | Status: SHIPPED | OUTPATIENT
Start: 2024-10-03

## 2024-10-17 ENCOUNTER — PATIENT OUTREACH (OUTPATIENT)
Dept: OBGYN CLINIC | Facility: CLINIC | Age: 34
End: 2024-10-17

## 2024-10-17 ENCOUNTER — INITIAL PRENATAL (OUTPATIENT)
Dept: OBGYN CLINIC | Facility: CLINIC | Age: 34
End: 2024-10-17

## 2024-10-17 DIAGNOSIS — B37.31 YEAST VAGINITIS: ICD-10-CM

## 2024-10-17 DIAGNOSIS — Z34.91 PRENATAL CARE IN FIRST TRIMESTER: Primary | ICD-10-CM

## 2024-10-17 PROCEDURE — 99211 OFF/OP EST MAY X REQ PHY/QHP: CPT | Performed by: OBSTETRICS & GYNECOLOGY

## 2024-10-17 RX ORDER — FLUCONAZOLE 150 MG/1
150 TABLET ORAL ONCE
Qty: 1 TABLET | Refills: 3 | Status: SHIPPED | OUTPATIENT
Start: 2024-10-17 | End: 2024-10-17

## 2024-10-17 NOTE — PROGRESS NOTES
CARINE FAJARDO was referred to complete a PN SW assessment. Pt is 34 y.o.    female. Pt GA is 10w1d and  Estimated Date of Delivery: 5/14/25. Pt primary language is English.    Pt reports this to be an unplanned but welcomed pregnancy. Pt has 4 children and this is her 5th pregnancy. Pt reports that her and FOB are happy. Pt denies financial difficulties, food insecurity, transportation concern, MH and MEG. Pt reports to have many family supports on her side and FOB. Pt denies concerns for this pregnancy. Pt reports to have government assistance with MA and SNAP.     CARINE FAJARDO provided pt with contact card and encouraged pt to contact CARINE FAJARDO for additional support if needed. CARINE FAJARDO will otherwise close this referral at this time.

## 2024-10-17 NOTE — LETTER
Dentist Letter          10/17/24      Kalyani Trinh  1990  [address]  [place] [id number]              We have had several requests from local dentist requesting permission to perform procedures on our patients who are pregnant. We wish to respond with this letter regarding some of the more routine procedures that we have been asked about.    The following procedures may be performed on our obstetric patients:   1. Administration of local anesthesia   2. Administration of antibiotics such as PCN, Ampicillin, and Erythromycin.   3. Administration of pain medications such as Tylenol, Tylenol with codeine, and if needed Percocet.   4. Shielded X-rays    Should you have any questions, please do not hesitate to contact at [phone].        Sincerely,    [facility]   [address] [place]  [address] [phone]

## 2024-10-17 NOTE — PROGRESS NOTES
OB INTAKE INTERVIEW  Pt presents for OB intake.     OB History    Para Term  AB Living   5 4 4     4   SAB IAB Ectopic Multiple Live Births         0 4      # Outcome Date GA Lbr Warren/2nd Weight Sex Type Anes PTL Lv   5 Current            4 Term 10/23/23 38w3d / 00:23 2665 g (5 lb 14 oz) F Vag-Spont EPI N MARTIN   3 Term 16 38w5d 04:35 / 00:09 2353 g (5 lb 3 oz) F Vag-Spont None N MARTIN   2 Term         MARTIN   1 Term         MARTIN     Hx of  delivery prior to 36 weeks 6 days:  No   If yes, place a referral for cervical surveillance at 16 weeks.     Last Menstrual Period:    No LMP recorded (lmp unknown). Patient is pregnant.     Ultrasound date: 10/03/2024  8 weeks 1 days     Estimated Date of Delivery: 25   Confirmed by LMP or US    H&P visit scheduled. 10/24/2024      Last pap smear: 2023    Findings; lab pap smear results: atypical squamous cellularity of undetermined significance (ASCUS)    Current Issues:  Constipation :   No  Headaches :   No  Cramping:  No  Spotting :   No  PICA cravings :  No    FOB Involved:   Yes  Planned pregnancy:  No    I have these concerns about this prenatal patient:   Unplanned but happy with pregnancy.  Met with SW prior to intake.  Intake done with patient and patient mother.  Patient states FOB is sickle cell trait carrier but she screened negative with last pregnancy.  Patient also just finished keflex for UTI, states UTI symptoms better but now has yeast infection.  RX sent by Dr. oTm.  Patient encouraged to complete lab work.    Interview education    Baby and Me Handout  [facility] Handout  [facility] Handout  Discussed genetic testing  Prenatal lab work: Scripts printed and given to pt.   Influenza vaccine given today: No  Discussed Tdap vaccine.   Immunizations:   Immunization History   Administered Date(s) Administered    Fluzone Split Quad 0.5 mL 2016    Hep B, Adolescent or Pediatric 2007,  03/19/2007, 12/10/2007    Hep B, adult 10/24/2023    INFLUENZA 03/19/2007    Pneumococcal Polysaccharide PPV23 02/13/2016    Tdap 02/13/2016, 09/21/2023     Depression Screening Follow-up Plan: Patient's depression screening was negative with an Carney score of  4  Patient assessed for underlying major depression. They have no active suicidal ideations. Brief counseling provided and recommend additional follow-up/re-evaluation next office visit..          Infection Screening: Does the pt have a hx of MRSA? No  If yes- please follow MRSA protocol and obtain a nasal swab for MRSA culture    The patient was oriented to our practice and all questions were answered.  Interviewed by: Flower Horn RN 10/17/24

## 2024-10-17 NOTE — LETTER
Proof of Pregnancy Letter    Kalyani Trinh  1990  [address]  [place] [address]        10/17/24      Kalyani Trinh is a patient at our facility. Kalyani Trinh Estimated Date of Delivery: 5/14/25       Any questions or concerns, please feel free to contact our office.    Sincerely,     [facility]   [address] [place] [address] [phone]

## 2024-10-17 NOTE — LETTER
Work Letter    Kalyani Trinh  1990  [address]  [place] [address]    Dear Kalyani Trinh,      10/17/24        Your employee is a patient at [facility].    We recommend that all pregnant women:    1. Have a well-ventilated workspace.  2. Wear low-heeled shoes.  3. Work no more than 40 hours per week.  4. Have a 15 minute break every 2 hours and at least 30 minutes for a meal break.   5. Use good body mechanics by bending at your knees to avoid back strain and lift no more than 20 pounds without assistance. Will need assistance with lifting over 20 lbs.   6. Have ready access to bathrooms and water.      She may continue to work until her due date unless medical complications arise. We anticipate she may return to work in 6-8 weeks after delivery.     Sincerely,    [facility]  [address] [place] [address] [phone]

## 2024-10-17 NOTE — LETTER
[facility] Letter    Kalyani Trinh  1990  [address] [facility] [address]  [place] [address]       10/17/24          Kalyani Trinh is a patient and under our care in our office. Kalyani Trinh's Estimated Date of Delivery: 5/14/25.  Any questions or concerns feel free to contact our office.     Thank you,    [facility]      [facility]  [phone]  [facility]  [phone]    [facility]/[place]  [phone]  [facility]  [phone]    [facility]   [phone]    [facility]  [phone]

## 2024-11-05 PROBLEM — Z87.59 HISTORY OF GESTATIONAL HYPERTENSION: Status: ACTIVE | Noted: 2024-11-05

## 2024-11-07 PROBLEM — O09.299 IUGR (INTRAUTERINE GROWTH RESTRICTION) IN PRIOR PREGNANCY, PREGNANT: Status: ACTIVE | Noted: 2024-11-07

## 2024-11-08 ENCOUNTER — ROUTINE PRENATAL (OUTPATIENT)
Dept: PERINATAL CARE | Facility: OTHER | Age: 34
End: 2024-11-08
Payer: COMMERCIAL

## 2024-11-08 VITALS
DIASTOLIC BLOOD PRESSURE: 80 MMHG | BODY MASS INDEX: 35.44 KG/M2 | WEIGHT: 200 LBS | HEART RATE: 73 BPM | SYSTOLIC BLOOD PRESSURE: 108 MMHG | HEIGHT: 63 IN

## 2024-11-08 DIAGNOSIS — O09.521 MULTIGRAVIDA OF ADVANCED MATERNAL AGE IN FIRST TRIMESTER: ICD-10-CM

## 2024-11-08 DIAGNOSIS — O09.299 IUGR (INTRAUTERINE GROWTH RESTRICTION) IN PRIOR PREGNANCY, PREGNANT: ICD-10-CM

## 2024-11-08 DIAGNOSIS — O99.211 OBESITY AFFECTING PREGNANCY IN FIRST TRIMESTER, UNSPECIFIED OBESITY TYPE: ICD-10-CM

## 2024-11-08 DIAGNOSIS — Z3A.13 13 WEEKS GESTATION OF PREGNANCY: ICD-10-CM

## 2024-11-08 DIAGNOSIS — Z87.59 HISTORY OF GESTATIONAL HYPERTENSION: Primary | ICD-10-CM

## 2024-11-08 DIAGNOSIS — Z36.82 ENCOUNTER FOR (NT) NUCHAL TRANSLUCENCY SCAN: ICD-10-CM

## 2024-11-08 PROCEDURE — 76813 OB US NUCHAL MEAS 1 GEST: CPT | Performed by: STUDENT IN AN ORGANIZED HEALTH CARE EDUCATION/TRAINING PROGRAM

## 2024-11-08 PROCEDURE — 99243 OFF/OP CNSLTJ NEW/EST LOW 30: CPT | Performed by: STUDENT IN AN ORGANIZED HEALTH CARE EDUCATION/TRAINING PROGRAM

## 2024-11-08 PROCEDURE — 76801 OB US < 14 WKS SINGLE FETUS: CPT | Performed by: STUDENT IN AN ORGANIZED HEALTH CARE EDUCATION/TRAINING PROGRAM

## 2024-11-08 RX ORDER — ASPIRIN 81 MG/1
162 TABLET ORAL DAILY
Qty: 60 TABLET | Refills: 3 | Status: SHIPPED | OUTPATIENT
Start: 2024-11-08

## 2024-11-08 NOTE — PROGRESS NOTES
Patient chose to have [facility] [id number] Non-Invasive Prenatal Screen [id number] PLUS w/ SCA, WITH fetal sex.  Patient choose to be billed through insurance.     Patient given brochure and is aware [facility] will contact patient's insurance and coordinate coverage.  Provided [facility] contact information. General inquiries [phone], Cost estimates [phone] and [facility] Billing [phone]. Website [email or web address].     Blood collection tubes labeled with patient identifiers (name, medical record number, and date of birth).     Filled out [facility] order form. Patient chose to be sent to an outpatient lab to complete blood work. .      If patient chose to have blood work drawn at a [facility] lab we requested patient notify MFM (via phone call or August message) when blood collected so office can follow up on results.       Maternal Fetal Medicine will have results in approximately 5-7 business days and will call patient or notify via August.  Patient aware viewing lab result online will reveal fetal sex if ordered.    Patient verbalized understanding of all instructions and no questions at this time.     20

## 2024-11-08 NOTE — LETTER
"2024     Maribel Sykes MD  [address] [place] [id number]    Patient: Kalyani Trinh   YOB: 1990   Date of Visit: 2024       Dear Dr. Sykes:    Thank you for referring Kalyani Trinh to me for evaluation. Below are my notes for this consultation.    If you have questions, please do not hesitate to call me. I look forward to following your patient along with you.         Sincerely,        Marilou Albright MD        CC: No Recipients    Marilou Albright MD  2024 11:48 AM  Sign when Signing Visit  [facility]: Ms. Trinh was seen today for nuchal translucency ultrasound.  See ultrasound report under \"OB Procedures\" tab.        Physical Exam  Constitutional:       General: She is not in acute distress.     Appearance: Normal appearance.   HENT:      Head: Normocephalic and atraumatic.   Eyes:      Extraocular Movements: Extraocular movements intact.   Cardiovascular:      Rate and Rhythm: Normal rate.   Pulmonary:      Effort: Pulmonary effort is normal. No respiratory distress.   Skin:     Findings: No erythema or rash.   Neurological:      Mental Status: She is alert and oriented to person, place, and time.   Psychiatric:         Mood and Affect: Mood normal.         Behavior: Behavior normal.         Please don't hesitate to contact our office with any concerns or questions.  -MD Rosangela Dickson Alyson  2024 11:18 AM  Sign when Signing Visit  Patient chose to have [facility] [id number] Non-Invasive Prenatal Screen [id number] PLUS w/ SCA, WITH fetal sex.  Patient choose to be billed through insurance.     Patient given brochure and is aware [facility] will contact patient's insurance and coordinate coverage.  Provided [facility] contact information. General inquiries [phone], Cost estimates [phone] and [facility] Billing [phone]. Website [email or web address].     Blood collection tubes " labeled with patient identifiers (name, medical record number, and date of birth).     Filled out [facility] order form. Patient chose to be sent to an outpatient lab to complete blood work. .      If patient chose to have blood work drawn at a [facility] lab we requested patient notify MFM (via phone call or dineout message) when blood collected so office can follow up on results.       Maternal Fetal Medicine will have results in approximately 5-7 business days and will call patient or notify via Ziltat.  Patient aware viewing lab result online will reveal fetal sex if ordered.    Patient verbalized understanding of all instructions and no questions at this time.

## 2025-01-02 ENCOUNTER — TELEPHONE (OUTPATIENT)
Dept: OBGYN CLINIC | Facility: CLINIC | Age: 35
End: 2025-01-02

## 2025-01-02 NOTE — TELEPHONE ENCOUNTER
Attempt to reach patient to discuss missed PN care and schedule PN visit if needed.  Message left for patient to call office

## 2025-01-17 PROBLEM — Z3A.23 23 WEEKS GESTATION OF PREGNANCY: Status: ACTIVE | Noted: 2025-01-17

## 2025-02-12 PROBLEM — Z3A.27 27 WEEKS GESTATION OF PREGNANCY: Status: ACTIVE | Noted: 2025-01-17

## 2025-02-26 ENCOUNTER — TELEPHONE (OUTPATIENT)
Dept: OBGYN CLINIC | Facility: CLINIC | Age: 35
End: 2025-02-26

## 2025-02-26 NOTE — TELEPHONE ENCOUNTER
LM for pt to call back [facility] to schedule PN appt -

## 2025-03-18 ENCOUNTER — HOSPITAL ENCOUNTER (EMERGENCY)
Facility: HOSPITAL | Age: 35
End: 2025-03-18
Attending: EMERGENCY MEDICINE | Admitting: EMERGENCY MEDICINE
Payer: COMMERCIAL

## 2025-03-18 ENCOUNTER — HOSPITAL ENCOUNTER (OUTPATIENT)
Facility: HOSPITAL | Age: 35
Discharge: HOME | End: 2025-03-18
Attending: OBSTETRICS & GYNECOLOGY | Admitting: OBSTETRICS & GYNECOLOGY
Payer: COMMERCIAL

## 2025-03-18 VITALS
HEIGHT: 63 IN | RESPIRATION RATE: 18 BRPM | DIASTOLIC BLOOD PRESSURE: 72 MMHG | WEIGHT: 185 LBS | OXYGEN SATURATION: 96 % | TEMPERATURE: 98.6 F | HEART RATE: 106 BPM | SYSTOLIC BLOOD PRESSURE: 157 MMHG | BODY MASS INDEX: 32.78 KG/M2

## 2025-03-18 VITALS
RESPIRATION RATE: 18 BRPM | WEIGHT: 185 LBS | TEMPERATURE: 98.9 F | SYSTOLIC BLOOD PRESSURE: 127 MMHG | HEIGHT: 63 IN | BODY MASS INDEX: 32.78 KG/M2 | HEART RATE: 98 BPM | DIASTOLIC BLOOD PRESSURE: 78 MMHG

## 2025-03-18 DIAGNOSIS — Z3A.32 32 WEEKS GESTATION OF PREGNANCY: Primary | ICD-10-CM

## 2025-03-18 DIAGNOSIS — O60.00 PRETERM LABOR: ICD-10-CM

## 2025-03-18 PROBLEM — Z3A.27 27 WEEKS GESTATION OF PREGNANCY: Status: RESOLVED | Noted: 2025-01-17 | Resolved: 2025-03-18

## 2025-03-18 PROBLEM — Z3A.31 31 WEEKS GESTATION OF PREGNANCY: Status: ACTIVE | Noted: 2025-03-18

## 2025-03-18 LAB
ABO GROUP BLD: NORMAL
BACTERIA UR QL AUTO: ABNORMAL /HPF
BASOPHILS # BLD AUTO: 0.02 THOUSANDS/ÂΜL (ref 0–0.1)
BASOPHILS NFR BLD AUTO: 0 % (ref 0–1)
BILIRUB UR QL STRIP: NEGATIVE
BLD GP AB SCN SERPL QL: NEGATIVE
CLARITY UR: ABNORMAL
COLOR UR: YELLOW
EOSINOPHIL # BLD AUTO: 0.01 THOUSAND/ÂΜL (ref 0–0.61)
EOSINOPHIL NFR BLD AUTO: 0 % (ref 0–6)
ERYTHROCYTE [DISTWIDTH] IN BLOOD BY AUTOMATED COUNT: 13.8 % (ref 11.6–15.1)
EXTERNAL SYPHILIS TOTAL IGG/IGM SCREENING: NORMAL
GLUCOSE UR STRIP-MCNC: NEGATIVE MG/DL
HCT VFR BLD AUTO: 30.8 % (ref 34.8–46.1)
HGB BLD-MCNC: 10.1 G/DL (ref 11.5–15.4)
HGB UR QL STRIP.AUTO: NEGATIVE
IMM GRANULOCYTES # BLD AUTO: 0.06 THOUSAND/UL (ref 0–0.2)
IMM GRANULOCYTES NFR BLD AUTO: 1 % (ref 0–2)
KETONES UR STRIP-MCNC: ABNORMAL MG/DL
LEUKOCYTE ESTERASE UR QL STRIP: ABNORMAL
LYMPHOCYTES # BLD AUTO: 0.23 THOUSANDS/ÂΜL (ref 0.6–4.47)
LYMPHOCYTES NFR BLD AUTO: 3 % (ref 14–44)
MCH RBC QN AUTO: 29.4 PG (ref 26.8–34.3)
MCHC RBC AUTO-ENTMCNC: 32.8 G/DL (ref 31.4–37.4)
MCV RBC AUTO: 90 FL (ref 82–98)
MONOCYTES # BLD AUTO: 0.62 THOUSAND/ÂΜL (ref 0.17–1.22)
MONOCYTES NFR BLD AUTO: 8 % (ref 4–12)
MUCOUS THREADS UR QL AUTO: ABNORMAL
NEUTROPHILS # BLD AUTO: 6.83 THOUSANDS/ÂΜL (ref 1.85–7.62)
NEUTS SEG NFR BLD AUTO: 88 % (ref 43–75)
NITRITE UR QL STRIP: NEGATIVE
NON-SQ EPI CELLS URNS QL MICRO: ABNORMAL /HPF
NRBC BLD AUTO-RTO: 0 /100 WBCS
PH UR STRIP.AUTO: 6.5 [PH]
PLATELET # BLD AUTO: 166 THOUSANDS/UL (ref 149–390)
PMV BLD AUTO: 11.5 FL (ref 8.9–12.7)
PROT UR STRIP-MCNC: ABNORMAL MG/DL
RBC # BLD AUTO: 3.44 MILLION/UL (ref 3.81–5.12)
RBC #/AREA URNS AUTO: ABNORMAL /HPF
RH BLD: POSITIVE
SP GR UR STRIP.AUTO: 1.02 (ref 1–1.03)
SPECIMEN EXPIRATION DATE: NORMAL
UROBILINOGEN UR STRIP-ACNC: 2 MG/DL
WBC # BLD AUTO: 7.77 THOUSAND/UL (ref 4.31–10.16)
WBC #/AREA URNS AUTO: ABNORMAL /HPF

## 2025-03-18 PROCEDURE — 86850 RBC ANTIBODY SCREEN: CPT

## 2025-03-18 PROCEDURE — 96360 HYDRATION IV INFUSION INIT: CPT

## 2025-03-18 PROCEDURE — 86901 BLOOD TYPING SEROLOGIC RH(D): CPT

## 2025-03-18 PROCEDURE — 99284 EMERGENCY DEPT VISIT MOD MDM: CPT

## 2025-03-18 PROCEDURE — 87086 URINE CULTURE/COLONY COUNT: CPT

## 2025-03-18 PROCEDURE — 81001 URINALYSIS AUTO W/SCOPE: CPT

## 2025-03-18 PROCEDURE — 99285 EMERGENCY DEPT VISIT HI MDM: CPT | Performed by: EMERGENCY MEDICINE

## 2025-03-18 PROCEDURE — 86803 HEPATITIS C AB TEST: CPT

## 2025-03-18 PROCEDURE — 87340 HEPATITIS B SURFACE AG IA: CPT

## 2025-03-18 PROCEDURE — 99214 OFFICE O/P EST MOD 30 MIN: CPT

## 2025-03-18 PROCEDURE — 86900 BLOOD TYPING SEROLOGIC ABO: CPT

## 2025-03-18 PROCEDURE — 87389 HIV-1 AG W/HIV-1&-2 AB AG IA: CPT

## 2025-03-18 PROCEDURE — 85025 COMPLETE CBC W/AUTO DIFF WBC: CPT

## 2025-03-18 PROCEDURE — 86762 RUBELLA ANTIBODY: CPT

## 2025-03-18 PROCEDURE — NC001 PR NO CHARGE: Performed by: OBSTETRICS & GYNECOLOGY

## 2025-03-18 PROCEDURE — 86706 HEP B SURFACE ANTIBODY: CPT

## 2025-03-18 PROCEDURE — 86780 TREPONEMA PALLIDUM: CPT

## 2025-03-18 PROCEDURE — 82728 ASSAY OF FERRITIN: CPT

## 2025-03-18 RX ORDER — ONDANSETRON 2 MG/ML
4 INJECTION INTRAMUSCULAR; INTRAVENOUS ONCE
Status: COMPLETED | OUTPATIENT
Start: 2025-03-18 | End: 2025-03-18

## 2025-03-18 RX ORDER — FAMOTIDINE 20 MG/1
20 TABLET, FILM COATED ORAL 2 TIMES DAILY PRN
Status: DISCONTINUED | OUTPATIENT
Start: 2025-03-18 | End: 2025-03-19 | Stop reason: HOSPADM

## 2025-03-18 RX ORDER — SODIUM CHLORIDE, SODIUM LACTATE, POTASSIUM CHLORIDE, CALCIUM CHLORIDE 600; 310; 30; 20 MG/100ML; MG/100ML; MG/100ML; MG/100ML
125 INJECTION, SOLUTION INTRAVENOUS CONTINUOUS
Status: DISCONTINUED | OUTPATIENT
Start: 2025-03-18 | End: 2025-03-19 | Stop reason: HOSPADM

## 2025-03-18 RX ORDER — ACETAMINOPHEN 325 MG/1
975 TABLET ORAL EVERY 6 HOURS PRN
Status: DISCONTINUED | OUTPATIENT
Start: 2025-03-18 | End: 2025-03-19 | Stop reason: HOSPADM

## 2025-03-18 RX ADMIN — SODIUM CHLORIDE, SODIUM LACTATE, POTASSIUM CHLORIDE, AND CALCIUM CHLORIDE 1000 ML: .6; .31; .03; .02 INJECTION, SOLUTION INTRAVENOUS at 19:52

## 2025-03-18 RX ADMIN — ACETAMINOPHEN 975 MG: 325 TABLET, FILM COATED ORAL at 19:53

## 2025-03-18 RX ADMIN — ONDANSETRON 4 MG: 2 INJECTION INTRAMUSCULAR; INTRAVENOUS at 19:53

## 2025-03-18 NOTE — EMTALA/ACUTE CARE TRANSFER
[facility] EMERGENCY DEPARTMENT  [address] [facility] [address]  Dept: [phone]      EMTALA TRANSFER CONSENT    NAME Kalyani Trinh                                         1990                              MRN [id number]    I have been informed of my rights regarding examination, treatment, and transfer   by Dr. Mariaelena Pacheco MD    Benefits: Specialized equipment and/or services available at the receiving facility (Include comment)________________________    Risks: Potential for delay in receiving treatment, Potential deterioration of medical condition, Increased discomfort during transfer, Possible worsening of condition or death during transfer      Consent for Transfer:  I acknowledge that my medical condition has been evaluated and explained to me by the emergency department physician or other qualified medical person and/or my attending physician, who has recommended that I be transferred to the service of  Accepting Physician: Dr. Kaur at [facility] The above potential benefits of such transfer, the potential risks associated with such transfer, and the probable risks of not being transferred have been explained to me, and I fully understand them.  The doctor has explained that, in my case, the benefits of transfer outweigh the risks.  I agree to be transferred.    I authorize the performance of emergency medical procedures and treatments upon me in both transit and upon arrival at the receiving facility.  Additionally, I authorize the release of any and all medical records to the receiving facility and request they be transported with me, if possible.  I understand that the safest mode of transportation during a medical emergency is an ambulance and that the Hospital advocates the use of this mode of transport. Risks of traveling to the receiving facility by car, including absence of medical control, life  sustaining equipment, such as oxygen, and medical personnel has been explained to me and I fully understand them.    (MONA CORRECT BOX BELOW)  [  ]  I consent to the stated transfer and to be transported by ambulance/helicopter.  [  ]  I consent to the stated transfer, but refuse transportation by ambulance and accept full responsibility for my transportation by car.  I understand the risks of non-ambulance transfers and I exonerate the Hospital and its staff from any deterioration in my condition that results from this refusal.    X___________________________________________    DATE  25  TIME________  Signature of patient or legally responsible individual signing on patient behalf           RELATIONSHIP TO PATIENT_________________________          Provider Certification    NAME Kalyani Trinh                                         1990                              MRN [id number]    A medical screening exam was performed on the above named patient.  Based on the examination:    Condition Necessitating Transfer The primary encounter diagnosis was 32 weeks gestation of pregnancy. A diagnosis of  labor was also pertinent to this visit.    Patient Condition: The patient has been stabilized such that within reasonable medical probability, no material deterioration of the patient condition or the condition of the unborn child(dandre) is likely to result from the transfer    Reason for Transfer: Level of Care needed not available at this facility    Transfer Requirements: Facility [facility]   Space available and qualified personnel available for treatment as acknowledged by    Agreed to accept transfer and to provide appropriate medical treatment as acknowledged by       Dr. Kaur  Appropriate medical records of the examination and treatment of the patient are provided at the time of transfer   STAFF INITIAL WHEN COMPLETED _______  Transfer will be performed by qualified  personnel from    and appropriate transfer equipment as required, including the use of necessary and appropriate life support measures.    Provider Certification: I have examined the patient and explained the following risks and benefits of being transferred/refusing transfer to the patient/family:         Based on these reasonable risks and benefits to the patient and/or the unborn child(dandre), and based upon the information available at the time of the patient’s examination, I certify that the medical benefits reasonably to be expected from the provision of appropriate medical treatments at another medical facility outweigh the increasing risks, if any, to the individual’s medical condition, and in the case of labor to the unborn child, from effecting the transfer.    X____________________________________________ DATE 03/18/25        TIME_______      ORIGINAL - SEND TO MEDICAL RECORDS   COPY - SEND WITH PATIENT DURING TRANSFER

## 2025-03-18 NOTE — ED NOTES
Patient is resting comfortably. Refusing to be transported via ambulance. Pt requesting to drive POV over to ashley Story RN  03/18/25 4050

## 2025-03-18 NOTE — PROGRESS NOTES
L&D Triage Note - OB/GYN   Name: Kalyani Trinh 34 y.o. female I MRN: [id number]  Unit/Bed#:  TRIAGE  I Date of Admission: 3/18/2025   Date of Service: 3/18/2025 I Hospital Day: 0         ASSESSMENT:    Kalyani Trinh is a 34 y.o.  at 31w6d who was evaluated today in triage due to lower back/abdominal pain and nausea. Patient is notable for lack of regular prenatal care. Microscopy wnl, cervical length wnl. SVE 0/0/-4 with no contractions on tocometer.  Patient's symptoms improved after receiving tylenol, zofran, and IV fluids. Reassuring work up, the patient does not appear to be in  labor and it is safe to discharge home. Emphasized importance of prenatal care in final few weeks of pregnancy and encouraged her to call and make an appointment.      PLAN:    #Cramping/Abdominal Pain/Nausea  - IV Fluids, tylenol, pepcid, zofran - symptoms improved after receiving  - Speculum exam: no abnormal discharge, no pooling, no bleeding  - Microscopy wnl  - Cervical length via TVUS: 4.19 cm  - SVE: 0/0/-4  - UA: 4+ ketones, indicating dehydration      #Infrequent Prenatal Care  -Last prenatal visit 24, no prenatal labs currently obtained  -Will obtain prenatal labs: CBC w/ diff, Hep B surface antibody & antigen, Hep C antibody, HIV, RPR, Rubella IgG, T&S, UA, urine culture      #31w6d gestation of pregnancy  - Continue routine prenatal care  - Discharge from OB triage with  labor precautions   - Reviewed rupture of membranes, false vs true labor, decreased fetal movement, and vaginal bleeding  - Pt to call provider with any concerns and follow up at her next scheduled prenatal appointment   - Case discussed with Sara Diamond MD      SUBJECTIVE:    Kalyani Trinh 34 y.o.  at 31w6d with an Estimated Date of Delivery: 25 presenting with abdominal pain and lower back pain that began last night. She also states that she has been feeling nauseous and vomited multiple  times over the past three days, last this morning. She denies fever, but has had a headache. Patient denies vaginal bleeding. Patient endorses [place] contractions, but denies regular contractions. Fetal movement is normal.     Her current pregnancy is notable for infrequent prenatal care (visits on 10/17/24 and 11/8/24, no prenatal labs)     ROS  General:No fevers, chills   Cardiovascular: No chest pain  HEENT: Headache, No visual changes, no dizziness, no sore throat  Pulmonary: No cough, dyspnea, wheezing  MSK:  Generalized body aches, lower back pain  GI: Nausea, vomiting, abdominal pain, No diarrhea, no blood in stools or black stools  : No dysuria or hematuria  Lower Extremity: Edema wnl for pregnancy    OBJECTIVE  Vitals:   Vitals:    03/18/25 1900   BP: 127/78   Pulse:    Resp:    Temp:      Body mass index is 32.77 kg/m².  GBS: Not Tested  Blood type: AB  Estimated Date of Delivery: 5/14/25    General Physical Exam:  General: Appears uncomfortable  Cardiovascular: tachycardic, regular rhythm, no murmurs  Lungs: non-labored breathing, lungs clear to auscultation  Abdomen: Soft, Gravid, mild tenderness to palpation to suprapubic region of abdomen   MSK: mild tenderness to palpation across lower back  Lower extremities: Edema wnl for pregnancy, no tenderness to squeezing calf bilaterally    Speculum: Normal vaginal epithelium, normal physiologic discharge, no yeast like discharge, no odor  SVE: 0 / 0% / -4    Fetal monitoring:  Fetal heart rate: Baseline Rate (FHR): 150 bpm  Variability: Moderate  Accelerations: 10 x 10 (<32 weeks)  Decelerations: None  FHR Category: Category I    [place]: Contraction Frequency (minutes): none    Wet mount/KOH: absent clue cells, absent hyphae, absent trichomonads present   Membrane Status: Nitrazine absent, Ferning absent, Pooling absent    Labs:   Recent Results (from the past 24 hours)   UA (URINE) with reflex to Scope    Collection Time: 03/18/25  8:27 PM   Result  Value Ref Range    Color, UA Yellow     Clarity, UA Turbid     Specific Gravity, UA 1.016 1.003 - 1.030    pH, UA 6.5 4.5, 5.0, 5.5, 6.0, 6.5, 7.0, 7.5, 8.0    Leukocytes, UA Large (A) Negative    Nitrite, UA Negative Negative    Protein, UA 30 (1+) (A) Negative mg/dl    Glucose, UA Negative Negative mg/dl    Ketones, UA >=150 (4+) (A) Negative mg/dl    Urobilinogen, UA 2.0 (A) <2.0 mg/dl mg/dl    Bilirubin, UA Negative Negative    Occult Blood, UA Negative Negative   CBC and differential    Collection Time: 03/18/25  8:27 PM   Result Value Ref Range    WBC 7.77 4.31 - 10.16 Thousand/uL    RBC 3.44 (L) 3.81 - 5.12 Million/uL    Hemoglobin 10.1 (L) 11.5 - 15.4 g/dL    Hematocrit 30.8 (L) 34.8 - 46.1 %    MCV 90 82 - 98 fL    MCH 29.4 26.8 - 34.3 pg    MCHC 32.8 31.4 - 37.4 g/dL    RDW 13.8 11.6 - 15.1 %    MPV 11.5 8.9 - 12.7 fL    Platelets 166 149 - 390 Thousands/uL    nRBC 0 /100 WBCs    Segmented % 88 (H) 43 - 75 %    Immature Grans % 1 0 - 2 %    Lymphocytes % 3 (L) 14 - 44 %    Monocytes % 8 4 - 12 %    Eosinophils Relative 0 0 - 6 %    Basophils Relative 0 0 - 1 %    Absolute Neutrophils 6.83 1.85 - 7.62 Thousands/µL    Absolute Immature Grans 0.06 0.00 - 0.20 Thousand/uL    Absolute Lymphocytes 0.23 (L) 0.60 - 4.47 Thousands/µL    Absolute Monocytes 0.62 0.17 - 1.22 Thousand/µL    Eosinophils Absolute 0.01 0.00 - 0.61 Thousand/µL    Basophils Absolute 0.02 0.00 - 0.10 Thousands/µL   Type and screen    Collection Time: 03/18/25  8:27 PM   Result Value Ref Range    ABO Grouping AB     Rh Factor Positive     Antibody Screen Negative     Specimen Expiration Date [id number]    Urine Microscopic    Collection Time: 03/18/25  8:27 PM   Result Value Ref Range    RBC, UA 4-10 (A) None Seen, 1-2 /hpf    WBC, UA 4-10 (A) None Seen, 1-2 /hpf    Epithelial Cells Moderate (A) None Seen, Occasional /hpf    Bacteria, UA Moderate (A) None Seen, Occasional /hpf    MUCUS THREADS Occasional (A) None Seen       Imaging:        TVUS   - Cervical length    - 4.19 cm    - 4.56 cm    - 4.79 cm        Devin Currie MD  Family Medicine PGY-1  3/18/2025 7:27 PM

## 2025-03-18 NOTE — ED PROVIDER NOTES
Time reflects when diagnosis was documented in both MDM as applicable and the Disposition within this note       Time User Action Codes Description Comment    3/18/2025  5:17 PM Mariaelena Pacheco Add [Z3A.32] 32 weeks gestation of pregnancy     3/18/2025  5:17 PM AdityaMariaelena valladares Add [O60.00]  labor           ED Disposition       ED Disposition   Transfer to Another Facility-In Network    Condition   --    Date/Time   Tue Mar 18, 2025  5:21 PM    Comment   Kalyani Trinh should be transferred out to [facility].               Assessment & Plan       Medical Decision Making  .  Immediately discussed with OB on call at [facility] and they will accept pt.  I advised them we need to send her to [facility] ASAP,  wants to drive her there.               Medications - No data to display    ED Risk Strat Scores                            SBIRT 22yo+      Flowsheet Row Most Recent Value   Initial Alcohol Screen: US AUDIT-C     1. How often do you have a drink containing alcohol? 0 Filed at: 2025   2. How many drinks containing alcohol do you have on a typical day you are drinking?  0 Filed at: 2025   3a. Male UNDER 65: How often do you have five or more drinks on one occasion? 0 Filed at: 2025   3b. FEMALE Any Age, or MALE 65+: How often do you have 4 or more drinks on one occassion? 0 Filed at: 2025   Audit-C Score 0 Filed at: 2025   ODELL: How many times in the past year have you...    Used an illegal drug or used a prescription medication for non-medical reasons? Never Filed at: 2025                            History of Present Illness       Chief Complaint   Patient presents with    Abdominal Pain Pregnant     32 weeks pregnant and having lower back pain and abd pain since last night that keeps getting worse. States this is her 5th pregnancy       Past Medical History:   Diagnosis Date    38 weeks gestation of pregnancy 2016     Abnormal Pap smear of cervix     Asthma     DOMITILA II (cervical intraepithelial neoplasia II)      (spontaneous vaginal delivery) 2016    UTI in pregnancy       History reviewed. No pertinent surgical history.   Family History   Problem Relation Age of Onset    Hypertension Mother     No Known Problems Sister     No Known Problems Sister     No Known Problems Sister     Spina bifida Brother     No Known Problems Brother     No Known Problems Daughter     No Known Problems Daughter     No Known Problems Daughter     Hypertension Maternal Grandmother     Deep vein thrombosis Paternal Grandmother     Breast cancer Neg Hx     Colon cancer Neg Hx     Ovarian cancer Neg Hx       Social History     Tobacco Use    Smoking status: Former     Current packs/day: 0.20     Types: Cigarettes    Smokeless tobacco: Never    Tobacco comments:     4 ciggs a day   Vaping Use    Vaping status: Former   Substance Use Topics    Alcohol use: Never     Alcohol/week: 1.0 standard drink of alcohol     Types: 1 Glasses of wine per week     Comment: occasionaly    Drug use: No      E-Cigarette/Vaping    E-Cigarette Use Former User     Comments quit when she found out she was pregnant       E-Cigarette/Vaping Substances    Nicotine No     THC No     CBD No     Flavoring No     Other No     Unknown No       I have reviewed and agree with the history as documented.     35 yo  at 32 weeks gestation c/o lower abdominal and lower back pain off and on since yesterday.  Worse today.  No leaking of fluids, no bleeding.  No problems or complications with this pregnancy.  She is supposed to deliver at [facility] and they were driving there but then decided to stop here instead.      History provided by:  Patient   used: No        Review of Systems        Objective       ED Triage Vitals   Temperature Pulse Blood Pressure Respirations SpO2 Patient Position - Orthostatic VS   25 1710 25 1712 25 1712 25  1710 03/18/25 1712 --   98.6 °F (37 °C) (!) 106 157/72 18 96 %       Temp src Heart Rate Source BP Location FiO2 (%) Pain Score    -- -- -- -- 03/18/25 1710        9      Vitals      Date and Time Temp Pulse SpO2 Resp BP Pain Score FACES Pain Rating User   03/18/25 1712 -- 106 96 % -- 157/72 -- -- DORIS   03/18/25 1710 98.6 °F (37 °C) -- -- 18 -- 9 -- DORIS            Physical Exam  Vitals reviewed.   Constitutional:       General: She is not in acute distress.     Appearance: She is not ill-appearing.   Pulmonary:      Effort: Pulmonary effort is normal. No respiratory distress.   Neurological:      General: No focal deficit present.      Mental Status: She is alert and oriented to person, place, and time.   Psychiatric:         Mood and Affect: Mood normal.         Behavior: Behavior normal.         Results Reviewed       None            No orders to display       Procedures    ED Medication and Procedure Management   Prior to Admission Medications   Prescriptions Last Dose Informant Patient Reported? Taking?   Prenatal Vit-Fe Fumarate-FA (Prenatal Complete) 14-0.4 MG TABS   No No   Sig: Take 1 tablet by mouth in the morning   aspirin (ECOTRIN LOW STRENGTH) 81 mg EC tablet   No No   Sig: Take 2 tablets (162 mg total) by mouth daily Stop at 36 weeks.  Contact your OB or MFM with any side effects.  See [email or web address]://[email or web address].[email or web address]   ondansetron (ZOFRAN) 4 mg tablet   No No   Sig: Take 1 tablet (4 mg total) by mouth every 8 (eight) hours as needed for nausea or vomiting      Facility-Administered Medications: None     Patient's Medications   Discharge Prescriptions    No medications on file     No discharge procedures on file.  ED SEPSIS DOCUMENTATION   Time reflects when diagnosis was documented in both MDM as applicable and the Disposition within this note       Time User Action Codes Description Comment    3/18/2025  5:17 PM Mariaelena Pacheco Add [Z3A.32] 32 weeks gestation of pregnancy     3/18/2025  5:17 PM  Mariaelena Pacheco Add [O60.00]  labor                  Mariaelena Pacheco MD  25 1728       Mariaelena Pacheco MD  25 173

## 2025-03-19 ENCOUNTER — TELEPHONE (OUTPATIENT)
Dept: OBGYN CLINIC | Facility: CLINIC | Age: 35
End: 2025-03-19

## 2025-03-19 ENCOUNTER — TELEPHONE (OUTPATIENT)
Facility: HOSPITAL | Age: 35
End: 2025-03-19

## 2025-03-19 LAB
BACTERIA UR CULT: NORMAL
FERRITIN SERPL-MCNC: 105 NG/ML (ref 11–307)
HIV 1+2 AB+HIV1 P24 AG SERPL QL IA: NORMAL
RUBV IGG SERPL IA-ACNC: 26.5 IU/ML
TREPONEMA PALLIDUM IGG+IGM AB [PRESENCE] IN SERUM OR PLASMA BY IMMUNOASSAY: NORMAL

## 2025-03-19 NOTE — TELEPHONE ENCOUNTER
----- Message from Sara Diamond MD sent at 3/19/2025  9:38 AM EDT -----  Regarding: Needs prenatal care!  Good morning,     Kalyani was seen in triage last night. She has not been seen in the office since her initial prenatal interview and viability scan. She is 32 weeks. She would benefit from further prenatal care. Please contact patient to offer an appointment. She had previously established with the [facility] and last night presented first to the [facility]. Her  was able to drive her from the [facility] to [place] but apparently some of her compliance issues have been due to transportation and therefore she may beneifr from a lift service to her appointments. Her home address is listed in [place] so she may be more appropriate to see at one of the other offices. Thanks for reaching out to her.     Take care,   ~ Sara

## 2025-03-19 NOTE — TELEPHONE ENCOUNTER
----- Message from Sara Diamond MD sent at 3/19/2025  9:40 AM EDT -----  Regarding: Needs Level II US  Good morning,     Kalyani was seen in triage last night. She has not been seen in the office since her initial prenatal interview and viability scan. She is 32 weeks. She would benefit from further prenatal care. Please contact patient to offer an appointment for an anatomy scan.     Thanks,  ~ Sara

## 2025-03-20 LAB
HBV SURFACE AB SER-ACNC: 53.9 MIU/ML
HBV SURFACE AG SER QL: NORMAL
HCV AB SER QL: NORMAL

## 2025-03-21 ENCOUNTER — TELEPHONE (OUTPATIENT)
Dept: OBGYN CLINIC | Facility: CLINIC | Age: 35
End: 2025-03-21

## 2025-03-21 NOTE — TELEPHONE ENCOUNTER
----- Message from Sara Diamond MD sent at 3/19/2025  9:38 AM EDT -----  Regarding: Needs prenatal care!  Good morning,     Kalyani was seen in triage last night. She has not been seen in the office since her initial prenatal interview and viability scan. She is 32 weeks. She would benefit from further prenatal care. Please contact patient to offer an appointment. She had previously established with the [facility] and last night presented first to the [place] ED. Her  was able to drive her from the [place] ED to [place] but apparently some of her compliance issues have been due to transportation and therefore she may beneifr from a lift service to her appointments. Her home address is listed in [place] so she may be more appropriate to see at one of the other offices. Thanks for reaching out to her.     Take care,   ~ Sara

## 2025-03-25 PROBLEM — Z3A.32 32 WEEKS GESTATION OF PREGNANCY: Status: ACTIVE | Noted: 2025-01-17

## 2025-03-26 ENCOUNTER — ROUTINE PRENATAL (OUTPATIENT)
Dept: OBGYN CLINIC | Facility: CLINIC | Age: 35
End: 2025-03-26

## 2025-03-26 VITALS
BODY MASS INDEX: 32.28 KG/M2 | DIASTOLIC BLOOD PRESSURE: 76 MMHG | HEART RATE: 81 BPM | WEIGHT: 182.2 LBS | HEIGHT: 63 IN | SYSTOLIC BLOOD PRESSURE: 109 MMHG

## 2025-03-26 DIAGNOSIS — O09.299 IUGR (INTRAUTERINE GROWTH RESTRICTION) IN PRIOR PREGNANCY, PREGNANT: Primary | ICD-10-CM

## 2025-03-26 DIAGNOSIS — Z23 ENCOUNTER FOR IMMUNIZATION: ICD-10-CM

## 2025-03-26 DIAGNOSIS — N87.1 CIN II (CERVICAL INTRAEPITHELIAL NEOPLASIA II): ICD-10-CM

## 2025-03-26 DIAGNOSIS — Z3A.33 33 WEEKS GESTATION OF PREGNANCY: ICD-10-CM

## 2025-03-26 DIAGNOSIS — O09.33 LIMITED PRENATAL CARE IN THIRD TRIMESTER: ICD-10-CM

## 2025-03-26 DIAGNOSIS — Z30.2 REQUEST FOR STERILIZATION: ICD-10-CM

## 2025-03-26 DIAGNOSIS — O99.013 ANEMIA DURING PREGNANCY IN THIRD TRIMESTER: ICD-10-CM

## 2025-03-26 DIAGNOSIS — Z87.59 HISTORY OF GESTATIONAL HYPERTENSION: ICD-10-CM

## 2025-03-26 PROBLEM — Z01.84 LACK OF IMMUNITY TO HEPATITIS B VIRUS DEMONSTRATED BY SEROLOGIC TEST: Chronic | Status: RESOLVED | Noted: 2023-10-19 | Resolved: 2025-03-26

## 2025-03-26 PROCEDURE — G0476 HPV COMBO ASSAY CA SCREEN: HCPCS | Performed by: NURSE PRACTITIONER

## 2025-03-26 PROCEDURE — 90471 IMMUNIZATION ADMIN: CPT | Performed by: NURSE PRACTITIONER

## 2025-03-26 PROCEDURE — 87491 CHLMYD TRACH DNA AMP PROBE: CPT | Performed by: NURSE PRACTITIONER

## 2025-03-26 PROCEDURE — G0145 SCR C/V CYTO,THINLAYER,RESCR: HCPCS | Performed by: NURSE PRACTITIONER

## 2025-03-26 PROCEDURE — 99213 OFFICE O/P EST LOW 20 MIN: CPT | Performed by: NURSE PRACTITIONER

## 2025-03-26 PROCEDURE — 90715 TDAP VACCINE 7 YRS/> IM: CPT | Performed by: NURSE PRACTITIONER

## 2025-03-26 PROCEDURE — 87591 N.GONORRHOEAE DNA AMP PROB: CPT | Performed by: NURSE PRACTITIONER

## 2025-03-26 RX ORDER — FERROUS SULFATE 324(65)MG
324 TABLET, DELAYED RELEASE (ENTERIC COATED) ORAL
Qty: 30 TABLET | Refills: 1 | Status: SHIPPED | OUTPATIENT
Start: 2025-03-26

## 2025-03-26 NOTE — PROGRESS NOTES
28 week education packet provided to patient on 03/26/25.    Included in packet:  Third Trimester paperwork  Delivery consent   Birthing room support person rules and acknowledgment  Birth Plan   Welcome information  Birth certificate worksheet   Consent for Photographers  Perineal/ Vaginal massage   Pediatric practices and locations

## 2025-03-26 NOTE — PROGRESS NOTES
[facility]  OB/GYN prenatal visit    S: 34 y.o.  32w6d here for PN visit. She has no obstetric complaints, including pelvic pain, contractions, vaginal bleeding, loss of fluid, or decreased fetal movement.  Limited prenatal care, last ultrasound was first trimester screen  Seen in ED for lower back pain and abdominal pain has resolved  Hx of FGR and GHTN in previous pregnancies  Hx of DOMITILA 2,   Reports feeling very tired  O:  Vitals:    25 0958   BP: 109/76   Pulse: 81       Gen: no acute distress, nonlabored breathing  Fundal Height (cm): 32 cm  Fetal Heart Rate: 140  Abdomen soft nontender  Cx FT/0/-4  A/P:      IUP at 32w6d  No obstetric complaints today  Order for level 2 ultrasound placed, is scheduled  for 25  Pap and HPV done today  Last US 24 at 13wk5d  LDASA 162 mgs taking  3/18/25 hgb 10.1, iron supplement  ordered, to take 1 a day to take on empty stomach or with orange juice reviewed  Vaccinations: Tdap given today  Contraception: MA 31 signed 3/26/2025, desires Depo bridge  Delivery consent signed today  Breastfeeding: Yes  Birth plan:  with epidural   Discussed  labor precautions and fetal kick counts    Return to clinic in 2 weeks  [place] score of 0 today    Pregnancy Problems (from 10/03/24 to present)       Problem Noted Diagnosed Resolved    Limited prenatal care in third trimester 3/26/2025 by MICHELLE Oh  No    33 weeks gestation of pregnancy 2025 by MICHELLE Roberts  No    Overview Addendum 3/26/2025 11:04 AM by MICHELLE Oh   Overview:  Labs   Pap smear 23 ASCUS  + HPV other, actinomyces, LEEP recommended  pap and HPV done 3/26/25 ; GC/CT 3/26/25  Prenatal panel- done 3/18/25, to complete 1 hr GTT,   28w labs  Vaccines:  Flu vaccine: no  Covid vaccine: none  Tdap vaccine: 3/26/25  Genetic screening  TWG  lb  Pre-gravid BMI pt reports was 200 lbs  Recommended weight gain 11-20 lb  Contraception: tubal, depo  JARET ash signed 3/26/25  Feeding plan: breast  Birth plan:  with epidural  Delivery consent: signed 3/26/25  Ultrasounds: 24 at 13w 5d, needs level 2-scheduled for  25    Assessment and Plan:  No obstetric complaints  Discussed  labor precautions and fetal kick counts  Return to clinic in 2 weeks          IUGR (intrauterine growth restriction) in prior pregnancy, pregnant 2024 by Marilou Albright MD  No    History of gestational hypertension 2024 by Marilou Albright MD  No    Request for sterilization 2023 by MICHELLE Oh  No    Overview Addendum 3/26/2025 10:30 AM by MICHELLE Oh MA signed 23  JARET Correia signed 3/26/25                    MICHELLE Oh  3/26/2025  10:31 AM

## 2025-03-28 LAB
C TRACH DNA SPEC QL NAA+PROBE: NEGATIVE
HPV HR 12 DNA CVX QL NAA+PROBE: NEGATIVE
HPV16 DNA CVX QL NAA+PROBE: NEGATIVE
HPV18 DNA CVX QL NAA+PROBE: NEGATIVE
N GONORRHOEA DNA SPEC QL NAA+PROBE: NEGATIVE

## 2025-03-30 ENCOUNTER — RESULTS FOLLOW-UP (OUTPATIENT)
Dept: OBGYN CLINIC | Facility: CLINIC | Age: 35
End: 2025-03-30

## 2025-03-31 LAB
LAB AP GYN PRIMARY INTERPRETATION: NORMAL
Lab: NORMAL

## 2025-04-01 NOTE — TELEPHONE ENCOUNTER
----- Message from MICHELLE Oh sent at 3/31/2025  9:18 PM EDT -----  Please inform pt that her pap and HPV were both negative.  Thank You.

## 2025-04-02 ENCOUNTER — TELEPHONE (OUTPATIENT)
Facility: HOSPITAL | Age: 35
End: 2025-04-02

## 2025-04-02 NOTE — TELEPHONE ENCOUNTER
Left VM for patient that 4/7 appt needs to be rescheduled as provider is in mandatory meeting in AM. Informed pt that we have same day [place] available at a later time or can move appt to different day of week. Requested pt give us a call back at [phone]

## 2025-04-04 ENCOUNTER — TELEPHONE (OUTPATIENT)
Dept: PERINATAL CARE | Facility: OTHER | Age: 35
End: 2025-04-04

## 2025-04-04 NOTE — TELEPHONE ENCOUNTER
Left voicemail informing patient we needed to reschedule her Monday April 7 appointment due to the Dr having a meeting. Requested she give our office a call back at [phone] to reschedule.

## 2025-04-17 ENCOUNTER — HOSPITAL ENCOUNTER (OUTPATIENT)
Facility: HOSPITAL | Age: 35
Discharge: HOME | End: 2025-04-17
Attending: OBSTETRICS & GYNECOLOGY | Admitting: OBSTETRICS & GYNECOLOGY
Payer: COMMERCIAL

## 2025-04-17 VITALS
TEMPERATURE: 98 F | RESPIRATION RATE: 18 BRPM | HEART RATE: 91 BPM | SYSTOLIC BLOOD PRESSURE: 102 MMHG | DIASTOLIC BLOOD PRESSURE: 54 MMHG

## 2025-04-17 DIAGNOSIS — B96.89 BACTERIAL VAGINOSIS: ICD-10-CM

## 2025-04-17 DIAGNOSIS — N76.0 BACTERIAL VAGINOSIS: ICD-10-CM

## 2025-04-17 DIAGNOSIS — O23.02 PYELONEPHRITIS AFFECTING PREGNANCY IN SECOND TRIMESTER: Primary | ICD-10-CM

## 2025-04-17 PROCEDURE — NC001 PR NO CHARGE: Performed by: OBSTETRICS & GYNECOLOGY

## 2025-04-17 PROCEDURE — 99213 OFFICE O/P EST LOW 20 MIN: CPT

## 2025-04-17 PROCEDURE — 87150 DNA/RNA AMPLIFIED PROBE: CPT

## 2025-04-17 RX ORDER — METRONIDAZOLE 500 MG/1
500 TABLET ORAL EVERY 12 HOURS SCHEDULED
Qty: 14 TABLET | Refills: 0 | Status: SHIPPED | OUTPATIENT
Start: 2025-04-17 | End: 2025-04-24

## 2025-04-17 NOTE — PROGRESS NOTES
L&D Triage Note - OB/GYN  Kalyani Trinh 34 y.o. female MRN: [id number]  Unit/Bed#:  TRIAGE - Encounter: [id number]      ASSESSMENT:    Kalyani Trinh is a 34 y.o.  at 36w1d presenting with leakage of fluid. Negative pooling and no ferning noted on the slides. Nitrazine positive secondary to semen, which was noted on microscopy. ISAI and SVE was declined. Patient was advised that this was a incomplete workup and had discussed the risks of  labor, chorioamnionitis, stillbirth, DIC, severe vaginal bleeding, and maternal death. Patient agreed to leave against medical advice.     PLAN:    1) Leakage of fluid  - No pooling, no ferning, positive nitrazene secondary to semen  - Declined SVE and ISAI  - Discussed the importance of complete workup for ruptured membranes   - Discussed the risks of  labor, chorioamnionitis, stillbirth, DIC, severe vaginal bleeding, and maternal death.   - Patient acknowledged these risks and wanted to leave without complete evaluation  - Discharge from OB triage with  labor and chorioamnionitis precautions   - Reviewed rupture of membranes, false vs true labor, decreased fetal movement, vaginal bleeding, severe abdominal pain, and fever/chills  - Left against medical advice    2) Domestic violence screening  - Patient partner was present and pleasant during encounter  - Patient's partner was asked to leave the room, and did so willingly   - Patient was then screened for DV  - Patient feels safe at home and in her relationship  - She denies physical, emotional, and sexual abuse      3) Prior history of pyelonephritis  - History of pyelonephritis in , and recurrent history of UTI's in pregnancy per patient   - Case discussed with Dr. Moore    SUBJECTIVE:    Kalyani Trinh 34 y.o.  at 36w1d with an Estimated Date of Delivery: 25 who presents to triage for leakage of fluid. She was brought in by ambulance after her aunt had called EMS to  come have her be evaluated. She reports having increased leakage over the last 5 days, and noticed white discharge today. He reports. She otherwise denies contractions, vaginal bleeding, and decreased fetal movement.    Her current obstetrical history is significant for 4     Her past obstetrical history is significant for prior history of pyelonephritis and insufficient prenatal care.     OBJECTIVE:    Vitals:    25 1112   BP: 102/54   Pulse: 91   Resp: 18   Temp: 98 °F (36.7 °C)       ROS:  Constitutional: Negative  Respiratory: Negative  Cardiovascular: Negative    Gastrointestinal: Negative    General Physical Exam:  General: Well appearing, no distress  Respiratory: Unlabored breathing  Cardiovascular: Regular rate.  Abdomen: Soft, gravid, nontender  Fundal Height: Appropriate for gestational age.  Extremities: Warm and well perfused.  Non tender.      Cervical Exam  Speculum: Cervical os is closed, no pooling, moderate amount of grey-white discharge, no bleeding  SVE: declined    FETAL ASSESSMENT:  Fetal heart rate: Baseline Rate (FHR): 140 bpm  Variability: Moderate  Decelerations: None  FHR Category: Category I  [place]: Contraction Frequency (minutes): x1  Contraction Duration (seconds): 80  Contraction Intensity: Mild    KOH/WTMT:     Infection:   - semen noted on microscopy slide   - no clue cells    - no hyphae   - no trichomonads present    Membrane status   - no ferning   - positive nitrazine secondary to semen   - no pooling     Imaging:        Abd. US   ISAI declined    Kareem Fabian MD  2025  12:13 PM

## 2025-04-18 PROBLEM — O99.210 OBESITY IN PREGNANCY, ANTEPARTUM: Status: ACTIVE | Noted: 2025-04-18

## 2025-04-18 PROBLEM — O09.523 MULTIGRAVIDA OF ADVANCED MATERNAL AGE IN THIRD TRIMESTER: Status: ACTIVE | Noted: 2025-04-18

## 2025-04-19 LAB — GP B STREP DNA SPEC QL NAA+PROBE: NEGATIVE

## 2025-05-06 ENCOUNTER — ANESTHESIA (INPATIENT)
Dept: ANESTHESIOLOGY | Facility: HOSPITAL | Age: 35
End: 2025-05-06
Payer: COMMERCIAL

## 2025-05-06 ENCOUNTER — HOSPITAL ENCOUNTER (INPATIENT)
Facility: HOSPITAL | Age: 35
LOS: 3 days | Discharge: HOME | End: 2025-05-09
Attending: OBSTETRICS & GYNECOLOGY | Admitting: OBSTETRICS & GYNECOLOGY
Payer: COMMERCIAL

## 2025-05-06 ENCOUNTER — ANESTHESIA EVENT (INPATIENT)
Dept: ANESTHESIOLOGY | Facility: HOSPITAL | Age: 35
End: 2025-05-06
Payer: COMMERCIAL

## 2025-05-06 DIAGNOSIS — Z87.59 HISTORY OF GESTATIONAL HYPERTENSION: ICD-10-CM

## 2025-05-06 LAB
ABO GROUP BLD: NORMAL
ALBUMIN SERPL BCG-MCNC: 3.5 G/DL (ref 3.5–5)
ALBUMIN SERPL BCG-MCNC: 3.6 G/DL (ref 3.5–5)
ALP SERPL-CCNC: 145 U/L (ref 34–104)
ALP SERPL-CCNC: 151 U/L (ref 34–104)
ALT SERPL W P-5'-P-CCNC: 18 U/L (ref 7–52)
ALT SERPL W P-5'-P-CCNC: 20 U/L (ref 7–52)
ANION GAP SERPL CALCULATED.3IONS-SCNC: 8 MMOL/L (ref 4–13)
ANION GAP SERPL CALCULATED.3IONS-SCNC: 9 MMOL/L (ref 4–13)
AST SERPL W P-5'-P-CCNC: 16 U/L (ref 13–39)
AST SERPL W P-5'-P-CCNC: 18 U/L (ref 13–39)
BILIRUB SERPL-MCNC: 0.9 MG/DL (ref 0.2–1)
BILIRUB SERPL-MCNC: 1.06 MG/DL (ref 0.2–1)
BLD GP AB SCN SERPL QL: NEGATIVE
BUN SERPL-MCNC: 5 MG/DL (ref 5–25)
BUN SERPL-MCNC: 6 MG/DL (ref 5–25)
CALCIUM SERPL-MCNC: 8.4 MG/DL (ref 8.4–10.2)
CALCIUM SERPL-MCNC: 8.5 MG/DL (ref 8.4–10.2)
CHLORIDE SERPL-SCNC: 103 MMOL/L (ref 96–108)
CHLORIDE SERPL-SCNC: 106 MMOL/L (ref 96–108)
CO2 SERPL-SCNC: 22 MMOL/L (ref 21–32)
CO2 SERPL-SCNC: 24 MMOL/L (ref 21–32)
CREAT SERPL-MCNC: 0.46 MG/DL (ref 0.6–1.3)
CREAT SERPL-MCNC: 0.51 MG/DL (ref 0.6–1.3)
CREAT UR-MCNC: 197.1 MG/DL
ERYTHROCYTE [DISTWIDTH] IN BLOOD BY AUTOMATED COUNT: 13.6 % (ref 11.6–15.1)
ERYTHROCYTE [DISTWIDTH] IN BLOOD BY AUTOMATED COUNT: 13.8 % (ref 11.6–15.1)
GFR SERPL CREATININE-BSD FRML MDRD: 124 ML/MIN/1.73SQ M
GFR SERPL CREATININE-BSD FRML MDRD: 129 ML/MIN/1.73SQ M
GLUCOSE SERPL-MCNC: 75 MG/DL (ref 65–140)
GLUCOSE SERPL-MCNC: 81 MG/DL (ref 65–140)
GLUCOSE SERPL-MCNC: 84 MG/DL (ref 65–140)
GLUCOSE SERPL-MCNC: 84 MG/DL (ref 65–140)
GLUCOSE SERPL-MCNC: 87 MG/DL (ref 65–140)
HCT VFR BLD AUTO: 30.6 % (ref 34.8–46.1)
HCT VFR BLD AUTO: 31.2 % (ref 34.8–46.1)
HGB BLD-MCNC: 10.1 G/DL (ref 11.5–15.4)
HGB BLD-MCNC: 10.7 G/DL (ref 11.5–15.4)
HIV 1+2 AB+HIV1 P24 AG SERPL QL IA: NORMAL
HIV1 P24 AG SER QL: NORMAL
MAGNESIUM SERPL-MCNC: 4.1 MG/DL (ref 1.9–2.7)
MCH RBC QN AUTO: 29.4 PG (ref 26.8–34.3)
MCH RBC QN AUTO: 29.8 PG (ref 26.8–34.3)
MCHC RBC AUTO-ENTMCNC: 33 G/DL (ref 31.4–37.4)
MCHC RBC AUTO-ENTMCNC: 34.3 G/DL (ref 31.4–37.4)
MCV RBC AUTO: 87 FL (ref 82–98)
MCV RBC AUTO: 89 FL (ref 82–98)
PLATELET # BLD AUTO: 219 THOUSANDS/UL (ref 149–390)
PLATELET # BLD AUTO: 231 THOUSANDS/UL (ref 149–390)
PMV BLD AUTO: 10.6 FL (ref 8.9–12.7)
PMV BLD AUTO: 10.8 FL (ref 8.9–12.7)
POTASSIUM SERPL-SCNC: 3.6 MMOL/L (ref 3.5–5.3)
POTASSIUM SERPL-SCNC: 3.6 MMOL/L (ref 3.5–5.3)
PROT SERPL-MCNC: 6.3 G/DL (ref 6.4–8.4)
PROT SERPL-MCNC: 6.5 G/DL (ref 6.4–8.4)
PROT UR-MCNC: 72.3 MG/DL
PROT/CREAT UR: 0.4 MG/G{CREAT}
RBC # BLD AUTO: 3.44 MILLION/UL (ref 3.81–5.12)
RBC # BLD AUTO: 3.59 MILLION/UL (ref 3.81–5.12)
RH BLD: POSITIVE
SODIUM SERPL-SCNC: 136 MMOL/L (ref 135–147)
SODIUM SERPL-SCNC: 136 MMOL/L (ref 135–147)
SPECIMEN EXPIRATION DATE: NORMAL
WBC # BLD AUTO: 8.28 THOUSAND/UL (ref 4.31–10.16)
WBC # BLD AUTO: 9.25 THOUSAND/UL (ref 4.31–10.16)

## 2025-05-06 PROCEDURE — 83735 ASSAY OF MAGNESIUM: CPT

## 2025-05-06 PROCEDURE — 85027 COMPLETE CBC AUTOMATED: CPT

## 2025-05-06 PROCEDURE — NC001 PR NO CHARGE: Performed by: OBSTETRICS & GYNECOLOGY

## 2025-05-06 PROCEDURE — 86780 TREPONEMA PALLIDUM: CPT

## 2025-05-06 PROCEDURE — 80053 COMPREHEN METABOLIC PANEL: CPT

## 2025-05-06 PROCEDURE — 86901 BLOOD TYPING SEROLOGIC RH(D): CPT

## 2025-05-06 PROCEDURE — 4A1HXCZ MONITORING OF PRODUCTS OF CONCEPTION, CARDIAC RATE, EXTERNAL APPROACH: ICD-10-PCS | Performed by: OBSTETRICS & GYNECOLOGY

## 2025-05-06 PROCEDURE — 84156 ASSAY OF PROTEIN URINE: CPT

## 2025-05-06 PROCEDURE — 82948 REAGENT STRIP/BLOOD GLUCOSE: CPT

## 2025-05-06 PROCEDURE — 82570 ASSAY OF URINE CREATININE: CPT

## 2025-05-06 PROCEDURE — 86850 RBC ANTIBODY SCREEN: CPT

## 2025-05-06 PROCEDURE — 86900 BLOOD TYPING SEROLOGIC ABO: CPT

## 2025-05-06 PROCEDURE — 87806 HIV AG W/HIV1&2 ANTB W/OPTIC: CPT

## 2025-05-06 RX ORDER — MAGNESIUM SULFATE HEPTAHYDRATE 40 MG/ML
4 INJECTION, SOLUTION INTRAVENOUS ONCE
Status: COMPLETED | OUTPATIENT
Start: 2025-05-06 | End: 2025-05-06

## 2025-05-06 RX ORDER — OXYTOCIN/0.9 % SODIUM CHLORIDE 30/500 ML
1-30 PLASTIC BAG, INJECTION (ML) INTRAVENOUS
Status: DISCONTINUED | OUTPATIENT
Start: 2025-05-06 | End: 2025-05-09

## 2025-05-06 RX ORDER — PHENYLEPHRINE HCL IN 0.9% NACL 1 MG/10 ML
100 SYRINGE (ML) INTRAVENOUS ONCE AS NEEDED
Status: DISCONTINUED | OUTPATIENT
Start: 2025-05-06 | End: 2025-05-07

## 2025-05-06 RX ORDER — ONDANSETRON 2 MG/ML
4 INJECTION INTRAMUSCULAR; INTRAVENOUS EVERY 6 HOURS PRN
Status: DISCONTINUED | OUTPATIENT
Start: 2025-05-06 | End: 2025-05-07

## 2025-05-06 RX ORDER — LABETALOL HYDROCHLORIDE 5 MG/ML
20 INJECTION, SOLUTION INTRAVENOUS ONCE
Status: COMPLETED | OUTPATIENT
Start: 2025-05-06 | End: 2025-05-06

## 2025-05-06 RX ORDER — BUPIVACAINE HYDROCHLORIDE 2.5 MG/ML
30 INJECTION, SOLUTION EPIDURAL; INFILTRATION; INTRACAUDAL; PERINEURAL ONCE AS NEEDED
Status: DISCONTINUED | OUTPATIENT
Start: 2025-05-06 | End: 2025-05-07

## 2025-05-06 RX ORDER — CALCIUM GLUCONATE 94 MG/ML
1 INJECTION, SOLUTION INTRAVENOUS ONCE AS NEEDED
Status: DISCONTINUED | OUTPATIENT
Start: 2025-05-06 | End: 2025-05-09

## 2025-05-06 RX ORDER — LIDOCAINE HYDROCHLORIDE AND EPINEPHRINE 15; 5 MG/ML; UG/ML
INJECTION, SOLUTION EPIDURAL
Status: COMPLETED | OUTPATIENT
Start: 2025-05-06 | End: 2025-05-06

## 2025-05-06 RX ORDER — SODIUM CHLORIDE, SODIUM LACTATE, POTASSIUM CHLORIDE, CALCIUM CHLORIDE 600; 310; 30; 20 MG/100ML; MG/100ML; MG/100ML; MG/100ML
125 INJECTION, SOLUTION INTRAVENOUS CONTINUOUS
Status: DISCONTINUED | OUTPATIENT
Start: 2025-05-06 | End: 2025-05-06

## 2025-05-06 RX ORDER — OXYTOCIN/0.9 % SODIUM CHLORIDE 30/500 ML
PLASTIC BAG, INJECTION (ML) INTRAVENOUS
Status: COMPLETED
Start: 2025-05-06 | End: 2025-05-06

## 2025-05-06 RX ORDER — SODIUM CHLORIDE, SODIUM LACTATE, POTASSIUM CHLORIDE, CALCIUM CHLORIDE 600; 310; 30; 20 MG/100ML; MG/100ML; MG/100ML; MG/100ML
50 INJECTION, SOLUTION INTRAVENOUS CONTINUOUS
Status: DISCONTINUED | OUTPATIENT
Start: 2025-05-06 | End: 2025-05-09

## 2025-05-06 RX ORDER — MAGNESIUM SULFATE HEPTAHYDRATE 40 MG/ML
2 INJECTION, SOLUTION INTRAVENOUS ONCE
Status: COMPLETED | OUTPATIENT
Start: 2025-05-06 | End: 2025-05-06

## 2025-05-06 RX ORDER — LORAZEPAM 2 MG/ML
INJECTION INTRAMUSCULAR
Status: DISPENSED
Start: 2025-05-06 | End: 2025-05-07

## 2025-05-06 RX ORDER — ALBUTEROL SULFATE 90 UG/1
2 INHALANT RESPIRATORY (INHALATION) EVERY 6 HOURS PRN
COMMUNITY

## 2025-05-06 RX ORDER — MAGNESIUM SULFATE HEPTAHYDRATE 40 MG/ML
INJECTION, SOLUTION INTRAVENOUS
Status: COMPLETED
Start: 2025-05-06 | End: 2025-05-06

## 2025-05-06 RX ORDER — DIPHENHYDRAMINE HYDROCHLORIDE 50 MG/ML
25 INJECTION, SOLUTION INTRAMUSCULAR; INTRAVENOUS EVERY 6 HOURS PRN
Status: DISCONTINUED | OUTPATIENT
Start: 2025-05-06 | End: 2025-05-07

## 2025-05-06 RX ORDER — MAGNESIUM SULFATE HEPTAHYDRATE 40 MG/ML
2 INJECTION, SOLUTION INTRAVENOUS CONTINUOUS
Status: DISPENSED | OUTPATIENT
Start: 2025-05-06 | End: 2025-05-08

## 2025-05-06 RX ADMIN — MAGNESIUM SULFATE HEPTAHYDRATE 4 G: 40 INJECTION, SOLUTION INTRAVENOUS at 19:36

## 2025-05-06 RX ADMIN — LIDOCAINE HYDROCHLORIDE AND EPINEPHRINE 2 ML: 15; 5 INJECTION, SOLUTION EPIDURAL at 21:34

## 2025-05-06 RX ADMIN — SODIUM CHLORIDE, SODIUM LACTATE, POTASSIUM CHLORIDE, AND CALCIUM CHLORIDE 50 ML/HR: .6; .31; .03; .02 INJECTION, SOLUTION INTRAVENOUS at 19:30

## 2025-05-06 RX ADMIN — Medication 2 MILLI-UNITS/MIN: at 22:28

## 2025-05-06 RX ADMIN — ONDANSETRON 4 MG: 2 INJECTION, SOLUTION INTRAMUSCULAR; INTRAVENOUS at 21:54

## 2025-05-06 RX ADMIN — ROPIVACAINE HYDROCHLORIDE: 2 INJECTION, SOLUTION EPIDURAL; INFILTRATION at 21:35

## 2025-05-06 RX ADMIN — MAGNESIUM SULFATE HEPTAHYDRATE 2 G: 40 INJECTION, SOLUTION INTRAVENOUS at 19:58

## 2025-05-06 RX ADMIN — LABETALOL HYDROCHLORIDE 20 MG: 5 INJECTION, SOLUTION INTRAVENOUS at 17:12

## 2025-05-06 RX ADMIN — LIDOCAINE HYDROCHLORIDE AND EPINEPHRINE 3 ML: 15; 5 INJECTION, SOLUTION EPIDURAL at 21:27

## 2025-05-06 RX ADMIN — MAGNESIUM SULFATE HEPTAHYDRATE 2 G/HR: 40 INJECTION, SOLUTION INTRAVENOUS at 20:10

## 2025-05-06 NOTE — ASSESSMENT & PLAN NOTE
Met criteria due to SSRBP on admission  CBC/CMP wnl, P:C 0.4  5/6: Received acute treatment with Labetalol 20  S/p Magnesium sulfate infusion  Continue to monitor blood pressures  Continue to monitor signs and symptoms of preeclampsia

## 2025-05-06 NOTE — PLAN OF CARE
Problem: BIRTH - VAGINAL/ SECTION  Goal: Fetal and maternal status remain reassuring during the birth process  Description: INTERVENTIONS:- Monitor vital signs- Monitor fetal heart rate- Monitor uterine activity- Monitor labor progression (vaginal delivery)- DVT prophylaxis- Antibiotic prophylaxis  Outcome: Progressing  Goal: Emotionally satisfying birthing experience for mother/fetus  Description: Interventions:- Assess, plan, implement and evaluate the nursing care given to the patient in labor- Advocate the philosophy that each childbirth experience is a unique experience and support the family's chosen level of involvement and control during the labor process - Actively participate in both the patient's and family's teaching of the birth process- Consider cultural, Yazdanism and age-specific factors and plan care for the patient in labor  Outcome: Progressing     Problem: Knowledge Deficit  Goal: Verbalizes understanding of labor plan  Description: Assess patient/family/caregiver's baseline knowledge level and ability to understand information.  Provide education via patient/family/caregiver's preferred learning method at appropriate level of understanding. 1. Provide teaching at level of understanding.2. Provide teaching via preferred learning method(s).  Outcome: Progressing  Goal: Patient/family/caregiver demonstrates understanding of disease process, treatment plan, medications, and discharge instructions  Description: Complete learning assessment and assess knowledge base.Interventions:- Provide teaching at level of understanding- Provide teaching via preferred learning methods  Outcome: Progressing     Problem: Labor & Delivery  Goal: Manages discomfort  Description: Assess and monitor for signs and symptoms of discomfort.  Assess patient's pain level regularly and per hospital policy.  Administer medications as ordered. Support use of nonpharmacological methods to help control pain such as  distraction, imagery, relaxation, and application of heat and cold.  Collaborate with interdisciplinary team and patient to determine appropriate pain management plan.1. Include patient in decisions related to comfort.2. Offer non-pharmacological pain management interventions.3. Report ineffective pain management to physician.  Outcome: Progressing  Goal: Patient vital signs are stable  Description: 1. Assess vital signs - vaginal delivery.  Outcome: Progressing     Problem: PAIN - ADULT  Goal: Verbalizes/displays adequate comfort level or baseline comfort level  Description: Interventions:- Encourage patient to monitor pain and request assistance- Assess pain using appropriate pain scale- Administer analgesics based on type and severity of pain and evaluate response- Implement non-pharmacological measures as appropriate and evaluate response- Consider cultural and social influences on pain and pain management- Notify physician/advanced practitioner if interventions unsuccessful or patient reports new pain  Outcome: Progressing     Problem: INFECTION - ADULT  Goal: Absence or prevention of progression during hospitalization  Description: INTERVENTIONS:- Assess and monitor for signs and symptoms of infection- Monitor lab/diagnostic results- Monitor all insertion sites, i.e. indwelling lines, tubes, and drains- Monitor endotracheal if appropriate and nasal secretions for changes in amount and color- [place] appropriate cooling/warming therapies per order- Administer medications as ordered- Instruct and encourage patient and family to use good hand hygiene technique- Identify and instruct in appropriate isolation precautions for identified infection/condition  Outcome: Progressing  Goal: Absence of fever/infection during neutropenic period  Description: INTERVENTIONS:- Monitor WBC  Outcome: Progressing     Problem: SAFETY ADULT  Goal: Patient will remain free of falls  Description: INTERVENTIONS:- Educate  patient/family on patient safety including physical limitations- Instruct patient to call for assistance with activity - Consult OT/PT to assist with strengthening/mobility - Keep Call bell within reach- Keep bed low and locked with side rails adjusted as appropriate- Keep care items and personal belongings within reach- Initiate and maintain comfort rounds  Outcome: Progressing  Goal: Maintain or return to baseline ADL function  Description: INTERVENTIONS:-  Assess patient's ability to carry out ADLs; assess patient's baseline for ADL function and identify physical deficits which impact ability to perform ADLs (bathing, care of mouth/teeth, toileting, grooming, dressing, etc.)- Assess/evaluate cause of self-care deficits - Assess range of motion- Assess patient's mobility; develop plan if impaired- Assess patient's need for assistive devices and provide as appropriate- Encourage maximum independence but intervene and supervise when necessary- Involve family in performance of ADLs- Assess for home care needs following discharge - Consider OT consult to assist with ADL evaluation and planning for discharge- Provide patient education as appropriate  Outcome: Progressing  Goal: Maintains/Returns to pre admission functional level  Description: INTERVENTIONS:- Perform AM-PAC 6 Click Basic Mobility/ Daily Activity assessment daily.- Set and communicate daily mobility goal to care team and patient/family/caregiver. - Collaborate with rehabilitation services on mobility goals if consulted-   Outcome: Progressing     Problem: DISCHARGE PLANNING  Goal: Discharge to home or other facility with appropriate resources  Description: INTERVENTIONS:- Identify barriers to discharge w/patient and caregiver- Arrange for needed discharge resources and transportation as appropriate- Identify discharge learning needs (meds, wound care, etc.)- Arrange for interpretive services to assist at discharge as needed- Refer to Case Management  Department for coordinating discharge planning if the patient needs post-hospital services based on physician/advanced practitioner order or complex needs related to functional status, cognitive ability, or social support system  Outcome: Progressing

## 2025-05-06 NOTE — H&P
H & P- Obstetrics   Kalyani Trinh 34 y.o. female MRN: [id number]  Unit/Bed#: LD TRIAGE 2 Encounter: [id number]    Assessment: 34 y.o.  at 38w6d admitted for labor and severe ranges blood pressure in triage .  SVE: /-3  FHT: Reactive cat I  Clinical EFW: 6 lbs Leopolds ; Vertex confirmed by TAUS  GBS status: Negative   Postpartum contraception plan: Depo / BS if C/s    Plan:   Anemia during pregnancy in third trimester  Assessment & Plan  3/18 Hgb 10.1  Ordered CBC on admission    History of gestational hypertension  Assessment & Plan  BP monitoring  Presented severe ranges BP in triage, received Labetalol 20  CBC, CMP and UPC ordered.      Uterine contractions  Assessment & Plan  Admit to L&D  CBC, RPR, Type & Screen  SVE: /-3  FHT: Reactive cat I  Clinical EFW: 6 lbs Leopolds ; Vertex confirmed by TAUS  GBS status: Negative   Postpartum contraception plan: Depo / BS if C/s  Analgesia at maternal request  Expectant management      Pregnancy with normal glucose tolerance test (GTT)  Assessment & Plan  Fingerstick glucose  Every 4/4h during ripening  Every 2/2h during latent phase  Every 1/1h during active phase    Request for sterilization  Assessment & Plan  Desires sterilization  MA 31 signed 3/26/25          Discussed case and plan w/ Dr. Sanchez      Chief Complaint: contractions    HPI: Kalyani Trinh is a 34 y.o.  with an LOIDA of 2025, by Ultrasound at 38w6d who is being admitted for labor e severe blood pressure ranges . She complains of uterine contractions, occurring every 10 minutes, has no LOF, and reports no VB. She states that the baby moves as usual.    Patient Active Problem List   Diagnosis    Bacterial vaginosis    DOMITILA II (cervical intraepithelial neoplasia II)     (spontaneous vaginal delivery)    Asthma    Request for sterilization    Pregnancy with normal glucose tolerance test (GTT)    Uterine contractions    History of gestational hypertension    IUGR  (intrauterine growth restriction) in prior pregnancy, pregnant    33 weeks gestation of pregnancy    31 weeks gestation of pregnancy    Limited prenatal care in third trimester    Anemia during pregnancy in third trimester    Pyelonephritis affecting pregnancy in second trimester    Multigravida of advanced maternal age in third trimester    Obesity in pregnancy, antepartum       Baby complications/comments: none    Review of Systems   Constitutional:  Negative for chills and fever.   HENT:  Negative for ear pain and sore throat.    Eyes:  Negative for pain and visual disturbance.   Respiratory:  Negative for cough and shortness of breath.    Cardiovascular:  Negative for chest pain and palpitations.   Gastrointestinal:  Negative for abdominal pain, nausea and vomiting.   Genitourinary:  Negative for dysuria, hematuria and vaginal bleeding.        Uterine contractions.   Musculoskeletal:  Negative for arthralgias and back pain.   Skin:  Negative for color change and rash.   Neurological:  Negative for seizures, syncope and headaches.   All other systems reviewed and are negative.      OB Hx:  OB History    Para Term  AB Living   5 4 4   3   SAB IAB Ectopic Multiple Live Births      0 4      # Outcome Date GA Lbr Warren/2nd Weight Sex Type Anes PTL Lv   5 Current            4 Term 10/23/23 38w3d / 00:23 2665 g (5 lb 14 oz) F Vag-Spont EPI N MARTIN   3 Term 02 38w5d 04:35 / 00:09 2353 g (5 lb 3 oz) F Vag-Spont None N MARTIN   2 Term      Vag-Spont   DEC   1 Term      Vag-Spont   MARTIN       Past Medical Hx:  Past Medical History:   Diagnosis Date    38 weeks gestation of pregnancy 2016    Abnormal Pap smear of cervix     Asthma     DOMITILA II (cervical intraepithelial neoplasia II)      (spontaneous vaginal delivery) 2016    UTI in pregnancy        Past Surgical hx:  No past surgical history on file.    Social Hx:  Alcohol use: No  Tobacco use: Former smoker  Other substance use: No    No Known  Allergies      Medications Prior to Admission:     albuterol (PROVENTIL HFA,VENTOLIN HFA) 90 mcg/act inhaler    Prenatal Vit-Fe Fumarate-FA (Prenatal Complete) 14-0.4 MG TABS    ferrous sulfate 324 (65 Fe) mg    ondansetron (ZOFRAN) 4 mg tablet    Objective:  Temp:  [98.1 °F (36.7 °C)] 98.1 °F (36.7 °C)  HR:  [63-71] 69  BP: (128-185)/() 130/74  Resp:  [18] 18  Body mass index is 32.28 kg/m².     Physical Exam:  Physical Exam  Constitutional:       Appearance: Normal appearance.   Genitourinary:      Vulva normal.   Pulmonary:      Effort: Pulmonary effort is normal. No respiratory distress.   Abdominal:      Palpations: Abdomen is soft.      Tenderness: There is no abdominal tenderness.      Comments: Gravid Uterus   Neurological:      Mental Status: She is alert and oriented to person, place, and time. Mental status is at baseline.   Skin:     General: Skin is warm and dry.   Psychiatric:         Mood and Affect: Mood normal.         Behavior: Behavior normal.   Vitals reviewed.            FHT:   Reactive presenting accelerations 15x15, no decels    TOCO:    Contractions q5    Lab Results   Component Value Date    WBC 8.28 05/06/2025    HGB 10.7 (L) 05/06/2025    HCT 31.2 (L) 05/06/2025     05/06/2025     Lab Results   Component Value Date    K 3.6 05/06/2025     05/06/2025    CO2 22 05/06/2025    BUN 6 05/06/2025    CREATININE 0.46 (L) 05/06/2025    AST 18 05/06/2025    ALT 20 05/06/2025     Prenatal Labs: Reviewed      Blood type: AB positive  Antibody: Negative  GBS: negative  HIV: Non-reactive  Rubella: Immune  Syphilis IgM/IgG: Non-reactive  HBsAg: Non-reactive  HCAb: Non-reactive  Chlamydia: Negative  Gonorrhea: Negative  Diabetes 1 hour screen: not done  3 hour glucose: n/a  Platelets: 166  Hgb: 10.1  <2 Midnights  INPATIENT     Signature/Title: Celia Montoya MD  Date: 5/6/2025  Time: 5:48 PM

## 2025-05-06 NOTE — ASSESSMENT & PLAN NOTE
Admit to L&D  CBC, RPR, Type & Screen  SVE: 4/60/-3  FHT: Reactive cat I  Clinical EFW: 6 lbs Leopolds ; Vertex confirmed by TAUS  GBS status: Negative   Postpartum contraception plan: Depo / BS if C/s  Analgesia at maternal request  Expectant management

## 2025-05-07 LAB
ALBUMIN SERPL BCG-MCNC: 3.4 G/DL (ref 3.5–5)
ALP SERPL-CCNC: 137 U/L (ref 34–104)
ALT SERPL W P-5'-P-CCNC: 16 U/L (ref 7–52)
ANION GAP SERPL CALCULATED.3IONS-SCNC: 10 MMOL/L (ref 4–13)
AST SERPL W P-5'-P-CCNC: 16 U/L (ref 13–39)
BASE EXCESS BLDCOA CALC-SCNC: -9.3 MMOL/L (ref 3–11)
BASE EXCESS BLDCOV CALC-SCNC: -7.2 MMOL/L (ref 1–9)
BILIRUB SERPL-MCNC: 0.99 MG/DL (ref 0.2–1)
BUN SERPL-MCNC: 4 MG/DL (ref 5–25)
CALCIUM ALBUM COR SERPL-MCNC: 8.3 MG/DL (ref 8.3–10.1)
CALCIUM SERPL-MCNC: 7.8 MG/DL (ref 8.4–10.2)
CHLORIDE SERPL-SCNC: 104 MMOL/L (ref 96–108)
CO2 SERPL-SCNC: 21 MMOL/L (ref 21–32)
CREAT SERPL-MCNC: 0.54 MG/DL (ref 0.6–1.3)
ERYTHROCYTE [DISTWIDTH] IN BLOOD BY AUTOMATED COUNT: 13.6 % (ref 11.6–15.1)
GFR SERPL CREATININE-BSD FRML MDRD: 122 ML/MIN/1.73SQ M
GLUCOSE SERPL-MCNC: 111 MG/DL (ref 65–140)
GLUCOSE SERPL-MCNC: 83 MG/DL (ref 65–140)
HCO3 BLDCOA-SCNC: 21.5 MMOL/L (ref 17.3–27.3)
HCO3 BLDCOV-SCNC: 19.7 MMOL/L (ref 12.2–28.6)
HCT VFR BLD AUTO: 30.5 % (ref 34.8–46.1)
HGB BLD-MCNC: 10.3 G/DL (ref 11.5–15.4)
HOLD SPECIMEN: NORMAL
MAGNESIUM SERPL-MCNC: 4.7 MG/DL (ref 1.9–2.7)
MCH RBC QN AUTO: 29.5 PG (ref 26.8–34.3)
MCHC RBC AUTO-ENTMCNC: 33.8 G/DL (ref 31.4–37.4)
MCV RBC AUTO: 87 FL (ref 82–98)
O2 CT VFR BLDCOA CALC: 12.9 ML/DL
OXYHGB MFR BLDCOA: 50.5 %
OXYHGB MFR BLDCOV: 44.7 %
PCO2 BLDCOA: 66 MM[HG] (ref 30–60)
PCO2 BLDCOV: 44.4 MM HG (ref 27–43)
PH BLDCOA: 7.13 [PH] (ref 7.23–7.43)
PH BLDCOV: 7.26 [PH] (ref 7.19–7.49)
PLATELET # BLD AUTO: 193 THOUSANDS/UL (ref 149–390)
PMV BLD AUTO: 10.5 FL (ref 8.9–12.7)
PO2 BLDCOA: 26.1 MM HG (ref 5–25)
PO2 BLDCOV: 20.5 MM HG (ref 15–45)
POTASSIUM SERPL-SCNC: 3.1 MMOL/L (ref 3.5–5.3)
PROT SERPL-MCNC: 6.1 G/DL (ref 6.4–8.4)
RBC # BLD AUTO: 3.49 MILLION/UL (ref 3.81–5.12)
SAO2 % BLDCOV: 10.9 ML/DL
SODIUM SERPL-SCNC: 135 MMOL/L (ref 135–147)
TREPONEMA PALLIDUM IGG+IGM AB [PRESENCE] IN SERUM OR PLASMA BY IMMUNOASSAY: NORMAL
WBC # BLD AUTO: 13.58 THOUSAND/UL (ref 4.31–10.16)

## 2025-05-07 PROCEDURE — 99205 OFFICE O/P NEW HI 60 MIN: CPT

## 2025-05-07 PROCEDURE — 83735 ASSAY OF MAGNESIUM: CPT

## 2025-05-07 PROCEDURE — 59409 OBSTETRICAL CARE: CPT | Performed by: OBSTETRICS & GYNECOLOGY

## 2025-05-07 PROCEDURE — 82805 BLOOD GASES W/O2 SATURATION: CPT | Performed by: OBSTETRICS & GYNECOLOGY

## 2025-05-07 PROCEDURE — 82948 REAGENT STRIP/BLOOD GLUCOSE: CPT

## 2025-05-07 PROCEDURE — 80053 COMPREHEN METABOLIC PANEL: CPT

## 2025-05-07 PROCEDURE — 96374 THER/PROPH/DIAG INJ IV PUSH: CPT

## 2025-05-07 PROCEDURE — 85027 COMPLETE CBC AUTOMATED: CPT

## 2025-05-07 RX ORDER — OXYTOCIN/0.9 % SODIUM CHLORIDE 30/500 ML
250 PLASTIC BAG, INJECTION (ML) INTRAVENOUS ONCE
Status: DISCONTINUED | OUTPATIENT
Start: 2025-05-07 | End: 2025-05-09

## 2025-05-07 RX ORDER — IBUPROFEN 600 MG/1
600 TABLET, FILM COATED ORAL EVERY 6 HOURS PRN
Status: DISCONTINUED | OUTPATIENT
Start: 2025-05-07 | End: 2025-05-09 | Stop reason: HOSPADM

## 2025-05-07 RX ORDER — MEDROXYPROGESTERONE ACETATE 150 MG/ML
150 INJECTION, SUSPENSION INTRAMUSCULAR ONCE
Status: COMPLETED | OUTPATIENT
Start: 2025-05-08 | End: 2025-05-08

## 2025-05-07 RX ORDER — ACETAMINOPHEN 325 MG/1
650 TABLET ORAL EVERY 6 HOURS PRN
Status: DISCONTINUED | OUTPATIENT
Start: 2025-05-07 | End: 2025-05-09 | Stop reason: HOSPADM

## 2025-05-07 RX ORDER — CALCIUM CARBONATE 500 MG/1
1000 TABLET, CHEWABLE ORAL 3 TIMES DAILY PRN
Status: DISCONTINUED | OUTPATIENT
Start: 2025-05-07 | End: 2025-05-09 | Stop reason: HOSPADM

## 2025-05-07 RX ORDER — ALBUTEROL SULFATE 90 UG/1
2 INHALANT RESPIRATORY (INHALATION) EVERY 6 HOURS PRN
Status: DISCONTINUED | OUTPATIENT
Start: 2025-05-07 | End: 2025-05-09 | Stop reason: HOSPADM

## 2025-05-07 RX ORDER — MEDROXYPROGESTERONE ACETATE 150 MG/ML
150 INJECTION, SUSPENSION INTRAMUSCULAR ONCE
Status: DISCONTINUED | OUTPATIENT
Start: 2025-05-07 | End: 2025-05-07

## 2025-05-07 RX ORDER — DIPHENHYDRAMINE HYDROCHLORIDE 50 MG/ML
25 INJECTION, SOLUTION INTRAMUSCULAR; INTRAVENOUS EVERY 6 HOURS PRN
Status: DISCONTINUED | OUTPATIENT
Start: 2025-05-07 | End: 2025-05-09 | Stop reason: HOSPADM

## 2025-05-07 RX ORDER — BENZOCAINE/MENTHOL 6 MG-10 MG
1 LOZENGE MUCOUS MEMBRANE DAILY PRN
Status: DISCONTINUED | OUTPATIENT
Start: 2025-05-07 | End: 2025-05-09 | Stop reason: HOSPADM

## 2025-05-07 RX ORDER — ONDANSETRON 2 MG/ML
4 INJECTION INTRAMUSCULAR; INTRAVENOUS EVERY 8 HOURS PRN
Status: DISCONTINUED | OUTPATIENT
Start: 2025-05-07 | End: 2025-05-09 | Stop reason: HOSPADM

## 2025-05-07 RX ORDER — SIMETHICONE 80 MG
80 TABLET,CHEWABLE ORAL 4 TIMES DAILY PRN
Status: DISCONTINUED | OUTPATIENT
Start: 2025-05-07 | End: 2025-05-09 | Stop reason: HOSPADM

## 2025-05-07 RX ADMIN — MAGNESIUM SULFATE HEPTAHYDRATE 2 G/HR: 40 INJECTION, SOLUTION INTRAVENOUS at 06:04

## 2025-05-07 RX ADMIN — HYDROCORTISONE 1 APPLICATION: 1 CREAM TOPICAL at 03:46

## 2025-05-07 RX ADMIN — BENZOCAINE AND LEVOMENTHOL 1 APPLICATION: 200; 5 SPRAY TOPICAL at 03:46

## 2025-05-07 RX ADMIN — IBUPROFEN 600 MG: 600 TABLET, FILM COATED ORAL at 04:30

## 2025-05-07 RX ADMIN — MAGNESIUM SULFATE HEPTAHYDRATE 2 G/HR: 40 INJECTION, SOLUTION INTRAVENOUS at 18:02

## 2025-05-07 RX ADMIN — IBUPROFEN 600 MG: 600 TABLET, FILM COATED ORAL at 15:40

## 2025-05-07 RX ADMIN — SODIUM CHLORIDE, SODIUM LACTATE, POTASSIUM CHLORIDE, AND CALCIUM CHLORIDE 50 ML/HR: .6; .31; .03; .02 INJECTION, SOLUTION INTRAVENOUS at 04:30

## 2025-05-07 RX ADMIN — WITCH HAZEL 1 PAD: 500 SOLUTION RECTAL; TOPICAL at 03:46

## 2025-05-07 NOTE — DISCHARGE SUMMARY
Obstetrics Discharge Summary  Kalyani Trinh 34 y.o. female MRN: [id number]  Unit/Bed#: -01 Encounter: [id number]    Admission Date: 2025     Discharge Date: ***    Admitting Attending: Laura  Delivery Attending: Laura  Discharging Attending: ***    Admitting Diagnoses:   Pregnancy at 38w  History of gestational hypertension  Request for sterilization    Discharge Diagnoses:   Delivered  Preeclampsia with severe features    Delivery  Route of Delivery:      Anesthesia:  ,   QBL: ***    Delivery:   at 2025 2:04 AM  ***Laceration: Perineal:   Repaired?      Baby's Weight:  ;      Apgar scores:   and   at 1 and 5 minutes, respectively      Hospital course: Kalyani Trinh is now a 34 y.o.  who was initially admitted at 38w6d for labor. At presentation cervix was 4/60/-3. An epidural was placed for analgesia. She had sustained severe range blood pressures during her labor course, meeting criteria for preeclampsia with severe features. Pitocin was started to augment contractions. She progressed to complete cervical dilation and began pushing.    Her post-delivery course was ***complicated by ***. Her postpartum pain was well controlled with ***oral analgesics. Maternal blood type is AB positive so RhoGAM was not indicated.    On day of discharge, she was ambulating and able to reasonably perform all ADLs. She was voiding and had appropriate bowel function. Pain was well controlled. She was discharged home on postpartum day #*** without complications. Patient was instructed to follow up with her OBGYN as an outpatient and was given appropriate warnings to call provider if she develops signs of infection or uncontrolled pain.    Complications: none apparent    Condition at discharge: good     Provisions for Follow-Up Care:  Please see after visit summary for information related to follow-up care and any pertinent home health orders.      Disposition: Home    Planned Readmission:  No    Discharge Medications:   Please see AVS for a complete list of discharge medications.    Discharge instructions :   Please see AVS for complete discharge instructions.    ***

## 2025-05-07 NOTE — PLAN OF CARE
Problem: BIRTH - VAGINAL/ SECTION  Goal: Fetal and maternal status remain reassuring during the birth process  Description: INTERVENTIONS:- Monitor vital signs- Monitor fetal heart rate- Monitor uterine activity- Monitor labor progression (vaginal delivery)- DVT prophylaxis- Antibiotic prophylaxis  2025 by Lore Boucher RN  Outcome: Completed  2025 by Lore Boucher RN  Outcome: Progressing  Goal: Emotionally satisfying birthing experience for mother/fetus  Description: Interventions:- Assess, plan, implement and evaluate the nursing care given to the patient in labor- Advocate the philosophy that each childbirth experience is a unique experience and support the family's chosen level of involvement and control during the labor process - Actively participate in both the patient's and family's teaching of the birth process- Consider cultural, Sabianist and age-specific factors and plan care for the patient in labor  2025 by Lore Boucher RN  Outcome: Completed  2025 by Lore Boucher RN  Outcome: Progressing     Problem: POSTPARTUM  Goal: Experiences normal postpartum course  Description: INTERVENTIONS:- Monitor maternal vital signs- Assess uterine involution and lochia  Outcome: Progressing  Goal: Appropriate maternal -  bonding  Description: INTERVENTIONS:- Identify family support- Assess for appropriate maternal/infant bonding -Encourage maternal/infant bonding opportunities- Referral to  or  as needed  Outcome: Progressing  Goal: Establishment of infant feeding pattern  Description: INTERVENTIONS:- Assess breast/bottle feeding- Refer to lactation as needed  Outcome: Progressing  Goal: Incision(s), wounds(s) or drain site(s) healing without S/S of infection  Description: INTERVENTIONS- Assess and document dressing, incision, wound bed, drain sites and surrounding tissue-  Outcome: Progressing     Problem: Knowledge  Deficit  Goal: Verbalizes understanding of labor plan  Description: Assess patient/family/caregiver's baseline knowledge level and ability to understand information.  Provide education via patient/family/caregiver's preferred learning method at appropriate level of understanding. 1. Provide teaching at level of understanding.2. Provide teaching via preferred learning method(s).  5/7/2025 0231 by Lore Boucher RN  Outcome: Completed  5/6/2025 1947 by Lore Boucher RN  Outcome: Progressing  Goal: Patient/family/caregiver demonstrates understanding of disease process, treatment plan, medications, and discharge instructions  Description: Complete learning assessment and assess knowledge base.Interventions:- Provide teaching at level of understanding- Provide teaching via preferred learning methods  5/7/2025 0231 by Lore Boucher RN  Outcome: Completed  5/6/2025 1947 by Lore Boucher RN  Outcome: Progressing     Problem: Labor & Delivery  Goal: Manages discomfort  Description: Assess and monitor for signs and symptoms of discomfort.  Assess patient's pain level regularly and per hospital policy.  Administer medications as ordered. Support use of nonpharmacological methods to help control pain such as distraction, imagery, relaxation, and application of heat and cold.  Collaborate with interdisciplinary team and patient to determine appropriate pain management plan.1. Include patient in decisions related to comfort.2. Offer non-pharmacological pain management interventions.3. Report ineffective pain management to physician.  5/7/2025 0231 by Lore Boucher RN  Outcome: Completed  5/6/2025 1947 by Lore Boucher RN  Outcome: Progressing  Goal: Patient vital signs are stable  Description: 1. Assess vital signs - vaginal delivery.  5/7/2025 0231 by Lore Boucher RN  Outcome: Completed  5/6/2025 1947 by Lore Boucher RN  Outcome: Progressing     Problem: PAIN - ADULT  Goal:  Verbalizes/displays adequate comfort level or baseline comfort level  Description: Interventions:- Encourage patient to monitor pain and request assistance- Assess pain using appropriate pain scale- Administer analgesics based on type and severity of pain and evaluate response- Implement non-pharmacological measures as appropriate and evaluate response- Consider cultural and social influences on pain and pain management- Notify physician/advanced practitioner if interventions unsuccessful or patient reports new pain  5/7/2025 0231 by Lore Boucher RN  Outcome: Progressing  5/6/2025 1947 by Lore Boucher RN  Outcome: Progressing     Problem: INFECTION - ADULT  Goal: Absence or prevention of progression during hospitalization  Description: INTERVENTIONS:- Assess and monitor for signs and symptoms of infection- Monitor lab/diagnostic results- Monitor all insertion sites, i.e. indwelling lines, tubes, and drains- Monitor endotracheal if appropriate and nasal secretions for changes in amount and color- [place] appropriate cooling/warming therapies per order- Administer medications as ordered- Instruct and encourage patient and family to use good hand hygiene technique- Identify and instruct in appropriate isolation precautions for identified infection/condition  5/7/2025 0231 by Lore Boucher RN  Outcome: Progressing  5/6/2025 1947 by Lore Boucher RN  Outcome: Progressing  Goal: Absence of fever/infection during neutropenic period  Description: INTERVENTIONS:- Monitor WBC  5/7/2025 0231 by Lore Boucher RN  Outcome: Progressing  5/6/2025 1947 by Lore Boucher RN  Outcome: Progressing     Problem: SAFETY ADULT  Goal: Patient will remain free of falls  Description: INTERVENTIONS:- Educate patient/family on patient safety including physical limitations- Instruct patient to call for assistance with activity - Consult OT/PT to assist with strengthening/mobility - Keep Call bell within reach-  Keep bed low and locked with side rails adjusted as appropriate- Keep care items and personal belongings within reach- Initiate and maintain comfort rounds  5/7/2025 0231 by Lore Boucher RN  Outcome: Progressing  5/6/2025 1947 by Lore Boucher RN  Outcome: Progressing  Goal: Maintain or return to baseline ADL function  Description: INTERVENTIONS:-  Assess patient's ability to carry out ADLs; assess patient's baseline for ADL function and identify physical deficits which impact ability to perform ADLs (bathing, care of mouth/teeth, toileting, grooming, dressing, etc.)- Assess/evaluate cause of self-care deficits - Assess range of motion- Assess patient's mobility; develop plan if impaired- Assess patient's need for assistive devices and provide as appropriate- Encourage maximum independence but intervene and supervise when necessary- Involve family in performance of ADLs- Assess for home care needs following discharge - Consider OT consult to assist with ADL evaluation and planning for discharge- Provide patient education as appropriate  5/7/2025 0231 by Lore Boucher RN  Outcome: Progressing  5/6/2025 1947 by Lore Boucher RN  Outcome: Progressing  Goal: Maintains/Returns to pre admission functional level  Description: INTERVENTIONS:- Perform AM-PAC 6 Click Basic Mobility/ Daily Activity assessment daily.- Set and communicate daily mobility goal to care team and patient/family/caregiver. - Collaborate with rehabilitation services on mobility goals if consulted  5/7/2025 0231 by Lore Boucher RN  Outcome: Progressing  5/6/2025 1947 by Lore Boucher RN  Outcome: Progressing     Problem: DISCHARGE PLANNING  Goal: Discharge to home or other facility with appropriate resources  Description: INTERVENTIONS:- Identify barriers to discharge w/patient and caregiver- Arrange for needed discharge resources and transportation as appropriate- Identify discharge learning needs (meds, wound care,  etc.)- Arrange for interpretive services to assist at discharge as needed- Refer to Case Management Department for coordinating discharge planning if the patient needs post-hospital services based on physician/advanced practitioner order or complex needs related to functional status, cognitive ability, or social support system  5/7/2025 0231 by Lore Boucher, RN  Outcome: Progressing  5/6/2025 1947 by Lore Boucher, RN  Outcome: Progressing

## 2025-05-07 NOTE — OB LABOR/OXYTOCIN SAFETY PROGRESS
Oxytocin Safety Progress Check Note - Kalyani Trinh 34 y.o. female MRN: [id number]    Unit/Bed#: -01 Encounter: [id number]    Dose (kallie-units/min) Oxytocin: 6 kallie-units/min  Contraction Frequency (minutes): 1-2.5  Contraction Intensity: Moderate  Uterine Activity Characteristics: Irritability  Cervical Dilation: 10  Dilation Complete Date: 05/07/25  Dilation Complete Time: 0150  Cervical Effacement: 100  Fetal Station: 1  Baseline Rate (FHR): 130 bpm  Fetal Heart Rate (FHT): 130 BPM  FHR Category: II             Vital Signs:   Vitals:    05/07/25 0152   BP: 104/64   Pulse: 81   Resp:    Temp:    SpO2:      Notes/comments:   Pt resting comfortably. FHT Category II, recurrent variable decelerations. [place] with contractions q2min. SVE as above. Will begin pushing. PGY3 Dr. Santiago present and aware.     Ld Mcdaniel MD 5/7/2025 1:56 AM

## 2025-05-07 NOTE — OB LABOR/OXYTOCIN SAFETY PROGRESS
Oxytocin Safety Progress Check Note - Kalyani Trinh 34 y.o. female MRN: [id number]    Unit/Bed#: -01 Encounter: [id number]    Dose (kallie-units/min) Oxytocin: 8 kallie-units/min  Contraction Frequency (minutes): 2.5-5.5  Contraction Intensity: Moderate  Uterine Activity Characteristics: Irregular  Cervical Dilation: 4-5        Cervical Effacement: 70  Fetal Station: -2  Baseline Rate (FHR): 120 bpm  Fetal Heart Rate (FHT): 115 BPM  FHR Category: II             Vital Signs:  Vitals:    25 0010   BP:    Pulse: 74   Resp: 18   Temp: 98.2 °F (36.8 °C)   SpO2: 100%     Notes/comments:   Pt resting comfortably. FHT Category II, intermittent late decelerations. Maternal repositioning underway. [place] with contractions q5min. SVE as above. Questionable SROM: per nursing, Chux pad seemed saturated following epidural placement; however, nitrazene testing of the fluid was negative. No sac palpated on SVE.    Pit running at 8, will continue to titrate to contractions. Will recheck in 2 hours or sooner if clinically indicated. Anticipate . PGY3 Dr. Remy Santiago aware.     Ld Mcdaniel MD 2025 12:16 AM

## 2025-05-07 NOTE — ASSESSMENT & PLAN NOTE
Patient progressing toward postpartum milestones.  - Continue routine postpartum care  - Pain management with oral analgesics  - Encourage breastfeeding, familial bonding  - Contraception: Depo Provera

## 2025-05-07 NOTE — PLAN OF CARE
Problem: PAIN - ADULT  Goal: Verbalizes/displays adequate comfort level or baseline comfort level  Description: Interventions:- Encourage patient to monitor pain and request assistance- Assess pain using appropriate pain scale- Administer analgesics based on type and severity of pain and evaluate response- Implement non-pharmacological measures as appropriate and evaluate response- Consider cultural and social influences on pain and pain management- Notify physician/advanced practitioner if interventions unsuccessful or patient reports new pain  Outcome: Progressing     Problem: INFECTION - ADULT  Goal: Absence or prevention of progression during hospitalization  Description: INTERVENTIONS:- Assess and monitor for signs and symptoms of infection- Monitor lab/diagnostic results- Monitor all insertion sites, i.e. indwelling lines, tubes, and drains- Monitor endotracheal if appropriate and nasal secretions for changes in amount and color- [place] appropriate cooling/warming therapies per order- Administer medications as ordered- Instruct and encourage patient and family to use good hand hygiene technique- Identify and instruct in appropriate isolation precautions for identified infection/condition  Outcome: Progressing  Goal: Absence of fever/infection during neutropenic period  Description: INTERVENTIONS:- Monitor WBC  Outcome: Progressing  Goal: Maintain or return to baseline ADL function  Description: INTERVENTIONS:-  Assess patient's ability to carry out ADLs; assess patient's baseline for ADL function and identify physical deficits which impact ability to perform ADLs (bathing, care of mouth/teeth, toileting, grooming, dressing, etc.)- Assess/evaluate cause of self-care deficits - Assess range of motion- Assess patient's mobility; develop plan if impaired- Assess patient's need for assistive devices and provide as appropriate- Encourage maximum independence but intervene and supervise when necessary- Involve  family in performance of ADLs- Assess for home care needs following discharge - Consider OT consult to assist with ADL evaluation and planning for discharge- Provide patient education as appropriate  Outcome: Progressing     Problem: DISCHARGE PLANNING  Goal: Discharge to home or other facility with appropriate resources  Description: INTERVENTIONS:- Identify barriers to discharge w/patient and caregiver- Arrange for needed discharge resources and transportation as appropriate- Identify discharge learning needs (meds, wound care, etc.)- Arrange for interpretive services to assist at discharge as needed- Refer to Case Management Department for coordinating discharge planning if the patient needs post-hospital services based on physician/advanced practitioner order or complex needs related to functional status, cognitive ability, or social support system  Outcome: Progressing     Problem: POSTPARTUM  Goal: Experiences normal postpartum course  Description: INTERVENTIONS:- Monitor maternal vital signs- Assess uterine involution and lochia  Outcome: Progressing  Goal: Appropriate maternal -  bonding  Description: INTERVENTIONS:- Identify family support- Assess for appropriate maternal/infant bonding -Encourage maternal/infant bonding opportunities- Referral to  or  as needed  Outcome: Progressing  Goal: Establishment of infant feeding pattern  Description: INTERVENTIONS:- Assess breast/bottle feeding- Refer to lactation as needed  Outcome: Progressing

## 2025-05-07 NOTE — LACTATION NOTE
This note was copied from a baby's chart.  CONSULT - LACTATION  Baby Boy Trinh ([place]) 0 days male MRN: [id number]    [facility] NURSERY Room / Bed: (N)/(N) Encounter: [id number]    Maternal Information     MOTHER:  Kalyani Trinh  Maternal Age: 34 y.o.  OB History: # 1 - Date: 09/16/07, Sex: Female, Weight: None, GA: None, Type: Vaginal, Spontaneous, Apgar1: None, Apgar5: None, Living: Living, Birth Comments: None    # 2 - Date: 01/14/10, Sex: Female, Weight: None, GA: None, Type: Vaginal, Spontaneous, Apgar1: None, Apgar5: None, Living: Living, Birth Comments: None    # 3 - Date: 02/12/16, Sex: Female, Weight: 2353 g (5 lb 3 oz), GA: 38w5d, Type: Vaginal, Spontaneous, Apgar1: 9, Apgar5: 9, Living: Living, Birth Comments: None    # 4 - Date: 10/23/23, Sex: Female, Weight: 2665 g (5 lb 14 oz), GA: 38w3d, Type: Vaginal, Spontaneous, Apgar1: 9, Apgar5: 9, Living: Living, Birth Comments: None    # 5 - Date: 05/07/25, Sex: Male, Weight: 2565 g (5 lb 10.5 oz), GA: 39w0d, Type: Vaginal, Spontaneous, Apgar1: 9, Apgar5: 9, Living: Living, Birth Comments: None   Previous breast reduction surgery? No    Lactation history:   Has patient previously breast fed: Yes   How long had patient previously breast fed: 6 mo. with her last child   Previous breast feeding complications: Lack of support   No past surgical history on file.    Birth information:  YOB: 2026   Time of birth: 2:04 AM   Sex: male   Delivery type: Vaginal, Spontaneous   Birth Weight: 2565 g (5 lb 10.5 oz)   Percent of Weight Change: 0%     Gestational Age: 39w0d   [unfilled]    Reason for Consult:    Reason for Consult: Initial assessment (ext) - 20 min, Latch Assess (ext) - 20 min, Discharge (routine) - 10 min    Risk Factors:         Breast and nipple assessment:   Breasts/Nipples  Left Breast: Soft  Right Breast: Soft  Left Nipple: Everted  Right Nipple: Everted  Intervention: Hand  expression  Breastfeeding Progress: Not yet established, Breastfeeding well    OB Lactation Tools:         Breast Pump:    Breast Pump  Pump: Personal (lansinoh hands free ordered)       Assessment: sleepy and spitty after fast pushing stage    Feeding assessment: latch difficulty (due to spitty after fast pushing stage. Enc. S2s, and NNS )  LATCH:  Latch: Repeated attempts, hold nipple in mouth, stimulate to suck   Audible Swallowing: A few with stimulation   Type of Nipple: Everted (After stimulation)   Comfort (Breast/Nipple): Soft/non-tender   Hold (Positioning): Partial assist, teach one side, mother does other, staff holds   LATCH Score: 7         Feeding recommendations:  breast feed on demand. Mom breast fed her 4th child for 6 months when baby started feeding complementary food. Mom wants to breast feed and feed expressed milk when she is away from the baby.       Mom:  Breast: pendulous, stretchy breasts with dark areolas  Nipple Assessment in General: Normal: elongated/eraser, no discoloration and no damage noted.  Mother's Awareness of Feeding Cues                 Recognizes: Yes                  Verbalizes: Yes  Support System: FOB  History of Breastfeeding: breast fed her 4th child for 6 mo.     Latch After Lactation Education/Consult:  Efficiency: football on the left breast              Lips Flanged: Yes              Depth of latch: deeper with demonstration and teach back              Audible Swallow: No              Visible Milk: Yes, with HE prior to latch              Wide Open/ Asymmetrical: No              Suck Swallow Cycle: Breathing: yes, Coordinated: some  Nipple Assessment after latch: Normal: elongated/eraser, no discoloration and no damage noted.  Latch Problems: alignment; lifting the baby up to the breast, how to achcieve a wide mouth; deep latch    Position After Lactation Education/Consult:  Infant's Ergonomics/Body               Body Alignment: Yes, with more pillows                Head Supported: Yes, enc. Snug hold               Close to Mom's body/ Lifted/ Supported: Yes, better with demonstration                Mom's Ergonomics/Body: Yes, better with lower lumbar support                           Supported: Yes                           Sitting Back: Yes                           Brings Baby to her breast: Yes, with nipple to nose, chin to breast and cheeks touching the breast  Positioning Problems: alignment ; spitty after fast pushing stage,     Enc. To offer both breasts. Enc. S2s for feeds. Enc. Breast compressions.    Mom wants Lansinoh hands free - sent order to CM     Enc. To call lactation.    Feeding Plan:     Information on hand expression given. Discussed benefits of knowing how to manually express breast including stimulating milk supply, softening nipple for latch and evacuating breast in the event of engorgement.    Mom is encouraged to place baby skin to skin for feedings. Skin to skin education provided for baby placement on mother's chest, baby only in diaper, blankets below shoulders on baby's back. Skin to skin is encouraged to continue at home for feedings and between feedings.      Early Feeding Cues:     First feeding cues include:  - a change in the baby's breathing pattern,   - sighing,   - rapid eye movement without opening his/her eyelids, or   - any early feeding cue signs located on the back of the Feeding log,  Begin hand expression and set yourself up to bring the baby up to the breast.     If your baby is getting close to the time that a feeding should begin:   - undress your baby and bring your baby skin to skin,   - have your baby's head in between your breasts, head turned to the side, so your baby's ear is listening to your heart.     Once your baby begins to stir, place your baby in a breastfeeding position that is comfortable and support your baby up to the breast. Always bring your baby to the breast, never the breast to your  "baby.      Education on positioning and alignment. Mom is encouraged to:     - Bring baby up to the breast (use of pillows to elevate so baby's torso is against mom's breasts)   - Skin to skin for feedings with top hand exposed to show signs of satiation   - Chin deep into breast tissue (make baby look up to the nipple)   - nose aligned to the nipple   -Wait for wide gape, place chin behind the areola on the breast so nipple is at the nose. Once baby opens their mouth wide, the nipple will be aimed at the upper, back palate  - Cheeks should be touching breast, and baby should have a long neck   - Ear, shoulder, hip will be in alignment   - Deep, snug hold of baby up to the breast   - Every baby knows how to breathe at the breast. The tip of the nose may appear to touch the breast. Babies breathe from the side of the nasal passages. If baby can not breathe due to improper alignment, baby will unlatch    Education on creating a snug hold of your infant to the breast by verifying the infant's cheek is touching the breast, your infant's chin is deep into the breast tissue, your infant's arms are \"hugging\" the breast, and your infant's lips are flanged on the areola. Bring infant to the breast, not your breast to the infant. Latch should feel like a tugging sensation on the nipple.    Demonstrated with teach back breast compressions during a feeding to increase milk transfer and stimulate suckling after a breathing/muscle break.       (Scan QR code for [employer] - positions)   Review [employer] on youtube or scan QR code for [employer] video      [employer]        [employer] - positions      Christy Hume, MA 5/7/2025 7:02 PM  "

## 2025-05-07 NOTE — ANESTHESIA POSTPROCEDURE EVALUATION
Post-Op Assessment Note    CV Status:  Stable  Pain Score: 0    Pain management: adequate      Post-op block assessment: no complications and catheter intact   Mental Status:  Alert and awake   Hydration Status:  Euvolemic   PONV Controlled:  Controlled   Airway Patency:  Patent     Post Op Vitals Reviewed: Yes    No anethesia notable event occurred.    Staff: CRNA   Comments: epidural catheter tip intact following removal        Last Filed PACU Vitals:  Vitals Value Taken Time   Temp     Pulse 68 05/07/25 0335   /60 05/07/25 0335   Resp     SpO2

## 2025-05-07 NOTE — ANESTHESIA PREPROCEDURE EVALUATION
Procedure:  LABOR ANALGESIA  Patient requests labor epidural    Relevant Problems   /RENAL   (+) Pyelonephritis affecting pregnancy in second trimester      GYN   (+) 31 weeks gestation of pregnancy   (+) 33 weeks gestation of pregnancy   (+) Multigravida of advanced maternal age in third trimester      HEMATOLOGY   (+) Anemia during pregnancy in third trimester      PULMONARY   (+) Asthma     Gestational HTN in pregnancy with SBP 180s     Labs reviewed  Physical Exam    Airway    Mallampati score: II         Dental   No notable dental hx     Cardiovascular      Pulmonary      Other Findings  Gravid uterus  Rt lower lip piercingpost-pubertal.      Anesthesia Plan  ASA Score- 3     Anesthesia Type- epidural with ASA Monitors.         Additional Monitors:     Airway Plan:            Plan Factors-Exercise tolerance (METS): >4 METS.    Chart reviewed.   Existing labs reviewed. Patient summary reviewed.    Patient is not a current smoker.              Induction-     Postoperative Plan-     Perioperative Resuscitation Plan - Level 1 - Full Code.       Informed Consent- Anesthetic plan and risks discussed with patient.  I personally reviewed this patient with the CRNA. Discussed and agreed on the Anesthesia Plan with the CRNA..      NPO Status:  Vitals Value Taken Time   Date of last liquid 05/06/25 05/06/25 1740   Time of last liquid 1740 05/06/25 1740   Date of last solid 05/06/25 05/06/25 1740   Time of last solid 0900 05/06/25 1740

## 2025-05-07 NOTE — L&D DELIVERY NOTE
Vaginal Delivery Summary - OB/GYN   Kalyani Trinh 34 y.o. female MRN: [id number]  Unit/Bed#: -01 Encounter: [id number]    Pre-delivery Diagnosis:   Pregnancy at 38w6d  Anemia  Preeclampsia with severe features    Post-delivery Diagnosis:   Same, delivered    Procedure: Spontaneous Vaginal Delivery    Attending: Dr. Sanchez    Assistant(s): Dr. Santiago, Dr. Mcdaniel    Anesthesia: Epidural    QBL: 4 mL    Complications: none apparent    Specimens:   1. Arterial and venous cord gases  2. Cord blood  3. Segment of umbilical cord  4. Placenta to storage     Findings:  1. Viable male  on 2025 at 0204, with APGARS of 9 and 9 at 1 and 5 minutes respectively  2. Spontaneous delivery of intact placenta at 0207  3. Blood gases:  Recent Results (from the past hour)   CORD, Blood gas, venous    Collection Time: 25  2:14 AM   Result Value Ref Range    pH, Cord Jose 7.264 7.190 - 7.490    pCO2, Cord Jose 44.4 (H) 27.0 - 43.0 mm HG    pO2, Cord Jose 20.5 15.0 - 45.0 mm HG    HCO3, Cord Joes 19.7 12.2 - 28.6 mmol/L    Base Exc, Cord Jose -7.2 (L) 1.0 - 9.0 mmol/L    O2 Cont, Cord Jose 10.9 mL/dL    O2 HGB,VENOUS CORD 44.7 %   CORD, Blood gas, arterial    Collection Time: 25  2:14 AM   Result Value Ref Range    pH, Cord Art 7.130 (L) 7.230 - 7.430    pCO2, Cord Art 66.0 (H) 30.0 - 60.0    pO2, Cord Art 26.1 (H) 5.0 - 25.0 mm HG    HCO3, Cord Art 21.5 17.3 - 27.3 mmol/L    Base Exc, Cord Art -9.3 (L) 3.0 - 11.0 mmol/L    O2 Content, Cord Art 12.9 ml/dl    O2 Hgb, Arterial Cord 50.5 %     Disposition:  Patient tolerated the delivery well and was recovering in labor and delivery room.     Brief history and labor course:  Kalyani Trinh presented as a 34 y.o.  at 38w6d with history significant for anemia. She presented to labor and delivery for contractions. She then had sustained severe range blood pressures and was admitted. She received 1 dose of Labetalol 20mg IV and was started on magnesium  sulfate gtt for preeclampsia with severe features. On initial exam she was noted to be 4/60/-3. She was relatively unchanged on her next check and was augmented with pitocin. Spontaneous rupture of membranes occurred. She progressed to complete and began to push.    Description of delivery:  After pushing for 4 minutes, at 0204 patient delivered a viable male , wt pending, apgars of 9 (1 min) and 9 (5 min). The fetal vertex delivered spontaneously in the left occiput anterior position. There was no nuchal cord. The right anterior shoulder delivered atraumatically with maternal expulsive forces and the assistance of gentle downward traction. The left posterior shoulder delivered with maternal expulsive forces and the assistance of gentle upward traction. The remainder of the fetus delivered spontaneously. Thick meconium-stained amniotic fluid was noted.    Upon delivery, the infant was placed on the mother's abdomen and the cord was clamped and cut after delayed cord clamping. The infant was noted to cry spontaneously and was moving all extremities appropriately. There was no evidence of injury. Awaiting nurse resuscitators evaluated the . Arterial and venous cord blood gases and cord blood was collected for analysis. These were promptly sent to the lab. In the immediate post-partum, 30 units of IV pitocin was administered, and the uterus was noted to contract down well with massage and pitocin. The placenta delivered spontaneously at 0207 and was noted to have a 3 vessel cord.     The vagina, cervix, perineum, and rectum were inspected and there was noted to be no lacerations.    The fundus was firm and at the level of the umbilicus. All needle, sponge, and instrument counts were noted to be correct. Patient tolerated the delivery well and was allowed to recover in the labor and delivery room with family and  before being transferred to the post-partum floor.     MD RUSSELL Unger  PGY-1  05/07/25  2:34 AM

## 2025-05-07 NOTE — ANESTHESIA PROCEDURE NOTES
Epidural Block    Patient location during procedure: OB/L&D  Start time: 5/6/2025 9:26 PM  Reason for block: procedure for pain  Staffing  Performed by: Toi Chowdhury CRNA  Authorized by: Jazmyne Muhammad MD    Preanesthetic Checklist  Completed: patient identified, IV checked, site marked, risks and benefits discussed, surgical consent, monitors and equipment checked, pre-op evaluation and timeout performed  Epidural  Patient position: sitting  Prep: ChloraPrep  Sedation Level: no sedation  Patient monitoring: frequent blood pressure checks, continuous pulse oximetry and heart rate  Approach: midline  Location: lumbar, L3-4  Injection technique: MARIAH saline  Needle  Needle type: Tuohy   Needle gauge: 17 G  Needle insertion depth: 7.5 cm  Catheter type: closed tip  Catheter size: 19 G  Catheter at skin depth: 13 cm  Catheter securement method: stabilization device and clear occlusive dressing  Test dose: negativelidocaine-epinephrine (XYLOCAINE-MPF/EPINEPHRINE) 1.5 %-1:200,000 injection 3 mL - Epidural   3 mL - 5/6/2025 9:27:00 PM  Assessment  Sensory level: T10  Number of attempts: 1negative aspiration for CSF, negative aspiration for heme and no paresthesia on injection  patient tolerated the procedure well with no immediate complications  Additional Notes  Uncomplicated placement  Mariah to saline at 7.5cm, catheter left at 13cm  Patient tolerated well with no evidence of immediate complications

## 2025-05-08 LAB
DME PARACHUTE DELIVERY DATE ACTUAL: NORMAL
DME PARACHUTE DELIVERY DATE REQUESTED: NORMAL
DME PARACHUTE ITEM DESCRIPTION: NORMAL
DME PARACHUTE ORDER STATUS: NORMAL
DME PARACHUTE SUPPLIER NAME: NORMAL
DME PARACHUTE SUPPLIER PHONE: NORMAL

## 2025-05-08 PROCEDURE — 99024 POSTOP FOLLOW-UP VISIT: CPT | Performed by: OBSTETRICS & GYNECOLOGY

## 2025-05-08 RX ORDER — NIFEDIPINE 30 MG/1
30 TABLET, EXTENDED RELEASE ORAL DAILY
Status: DISCONTINUED | OUTPATIENT
Start: 2025-05-09 | End: 2025-05-08

## 2025-05-08 RX ORDER — NIFEDIPINE 30 MG/1
30 TABLET, EXTENDED RELEASE ORAL DAILY
Status: DISCONTINUED | OUTPATIENT
Start: 2025-05-08 | End: 2025-05-08

## 2025-05-08 RX ORDER — NIFEDIPINE 60 MG/1
60 TABLET, EXTENDED RELEASE ORAL DAILY
Status: DISCONTINUED | OUTPATIENT
Start: 2025-05-09 | End: 2025-05-09

## 2025-05-08 RX ORDER — NIFEDIPINE 30 MG/1
30 TABLET, EXTENDED RELEASE ORAL ONCE
Status: COMPLETED | OUTPATIENT
Start: 2025-05-08 | End: 2025-05-08

## 2025-05-08 RX ADMIN — NIFEDIPINE 30 MG: 30 TABLET, FILM COATED, EXTENDED RELEASE ORAL at 08:26

## 2025-05-08 RX ADMIN — IBUPROFEN 600 MG: 600 TABLET, FILM COATED ORAL at 05:17

## 2025-05-08 RX ADMIN — IBUPROFEN 600 MG: 600 TABLET, FILM COATED ORAL at 20:17

## 2025-05-08 RX ADMIN — NIFEDIPINE 30 MG: 30 TABLET, FILM COATED, EXTENDED RELEASE ORAL at 16:44

## 2025-05-08 RX ADMIN — MEDROXYPROGESTERONE ACETATE 150 MG: 150 INJECTION, SUSPENSION INTRAMUSCULAR at 08:30

## 2025-05-08 NOTE — PROGRESS NOTES
Progress Note - OB/GYN   Name: Kalyani Trinh 34 y.o. female I MRN: [id number]  Unit/Bed#: -01 I Date of Admission: 2025   Date of Service: 2025 I Hospital Day: 2     Assessment & Plan   (spontaneous vaginal delivery)  Patient progressing toward postpartum milestones.  - Continue routine postpartum care  - Pain management with oral analgesics  - Encourage breastfeeding, familial bonding  - Contraception: Depo Provera  Request for sterilization  Desires sterilization  MA 31 signed 3/26/25  Pregnancy with normal glucose tolerance test (GTT)  2 hr GTT postpartum  Preeclampsia with severe features  Met criteria due to SSRBP on admission  CBC/CMP wnl, P:C 0.4  : Received acute treatment with Labetalol 20  S/p Magnesium sulfate infusion  Continue to monitor blood pressures  Continue to monitor signs and symptoms of preeclampsia  Limited prenatal care in third trimester    Anemia during pregnancy in third trimester  3/18 Hgb 10.1  Ordered CBC on admission    OB Post-Partum Progress Note  Subjective   Post delivery. Patient is doing well. Lochia WNL. Pain well controlled.     Pain: yes, cramping, improved with meds  Tolerating PO: yes  Voiding: yes  Flatus: yes  BM: no  Ambulating: yes  Breastfeeding:  no  Chest pain: no  Shortness of breath: no  Leg pain: no  Lochia: WNL    Objective :  Temp:  [97.7 °F (36.5 °C)-98.3 °F (36.8 °C)] 98.1 °F (36.7 °C)  HR:  [61-76] 69  BP: (128-143)/(72-99) 140/91  Resp:  [18-20] 18  SpO2:  [95 %-99 %] 98 %  O2 Device: None (Room air)    Physical Exam  Vitals and nursing note reviewed.   Constitutional:       General: She is not in acute distress.     Appearance: She is well-developed.   HENT:      Head: Normocephalic and atraumatic.   Eyes:      Conjunctiva/sclera: Conjunctivae normal.   Cardiovascular:      Rate and Rhythm: Normal rate and regular rhythm.      Heart sounds: No murmur heard.  Pulmonary:      Effort: Pulmonary effort is normal. No respiratory distress.       Breath sounds: Normal breath sounds.   Abdominal:      Palpations: Abdomen is soft.      Tenderness: There is no abdominal tenderness.   Musculoskeletal:         General: No swelling.      Cervical back: Neck supple.   Skin:     General: Skin is warm and dry.      Capillary Refill: Capillary refill takes less than 2 seconds.   Neurological:      Mental Status: She is alert.   Psychiatric:         Mood and Affect: Mood normal.         Lab Results: I have reviewed the following results:  Lab Results   Component Value Date    WBC 13.58 (H) 05/07/2025    HGB 10.3 (L) 05/07/2025    HCT 30.5 (L) 05/07/2025    MCV 87 05/07/2025     05/07/2025     Nova Maravilla  PGY-1 OB/GYN  05/08/25  6:23 AM

## 2025-05-08 NOTE — CASE MANAGEMENT
Case Management Progress Note    Patient name Kalyani Trinh  Location /-01 MRN [id number]  : 1990 Date 2025       LOS (days): 2  Geometric Mean LOS (GMLOS) (days):   Days to GMLOS:        OBJECTIVE:        Current admission status: Inpatient  Preferred Pharmacy:   [facility]/pharmacy #[id number] - [address] [address]  Phone: [phone] Fax: [phone]    Primary Care Provider: Arnold Rucker MD    Primary Insurance: [employer]  Secondary Insurance:     PROGRESS NOTE:    CM received consult for MOB requesting Lansinoh Discreet Duo for home use. Order placed to [facility] via [place]. Pump delivered to bedside by CM.

## 2025-05-08 NOTE — PLAN OF CARE
Problem: POSTPARTUM  Goal: Experiences normal postpartum course  Description: INTERVENTIONS:- Monitor maternal vital signs- Assess uterine involution and lochia  Outcome: Progressing  Goal: Appropriate maternal -  bonding  Description: INTERVENTIONS:- Identify family support- Assess for appropriate maternal/infant bonding -Encourage maternal/infant bonding opportunities- Referral to  or  as needed  Outcome: Progressing  Goal: Establishment of infant feeding pattern  Description: INTERVENTIONS:- Assess breast/bottle feeding- Refer to lactation as needed  Outcome: Progressing  Goal: Incision(s), wounds(s) or drain site(s) healing without S/S of infection  Description: INTERVENTIONS- Assess and document dressing, incision, wound bed, drain sites and surrounding tissue- Provide patient and family education  Outcome: Progressing     Problem: PAIN - ADULT  Goal: Verbalizes/displays adequate comfort level or baseline comfort level  Description: Interventions:- Encourage patient to monitor pain and request assistance- Assess pain using appropriate pain scale- Administer analgesics based on type and severity of pain and evaluate response- Implement non-pharmacological measures as appropriate and evaluate response- Consider cultural and social influences on pain and pain management- Notify physician/advanced practitioner if interventions unsuccessful or patient reports new pain  Outcome: Progressing     Problem: INFECTION - ADULT  Goal: Absence or prevention of progression during hospitalization  Description: INTERVENTIONS:- Assess and monitor for signs and symptoms of infection- Monitor lab/diagnostic results- Monitor all insertion sites, i.e. indwelling lines, tubes, and drains- Monitor endotracheal if appropriate and nasal secretions for changes in amount and color- [place] appropriate cooling/warming therapies per order- Administer medications as ordered- Instruct and encourage patient  and family to use good hand hygiene technique- Identify and instruct in appropriate isolation precautions for identified infection/condition  Outcome: Progressing  Goal: Absence of fever/infection during neutropenic period  Description: INTERVENTIONS:- Monitor WBC  Outcome: Progressing     Problem: SAFETY ADULT  Goal: Patient will remain free of falls  Description: INTERVENTIONS:- Educate patient/family on patient safety including physical limitations- Instruct patient to call for assistance with activity - Consult OT/PT to assist with strengthening/mobility - Keep Call bell within reach- Keep bed low and locked with side rails adjusted as appropriate- Keep care items and personal belongings within reach- Initiate and maintain comfort rounds- Make Fall Risk Sign visible to staff  Outcome: Progressing  Goal: Maintain or return to baseline ADL function  Description: INTERVENTIONS:-  Assess patient's ability to carry out ADLs; assess patient's baseline for ADL function and identify physical deficits which impact ability to perform ADLs (bathing, care of mouth/teeth, toileting, grooming, dressing, etc.)- Assess/evaluate cause of self-care deficits - Assess range of motion- Assess patient's mobility; develop plan if impaired- Assess patient's need for assistive devices and provide as appropriate- Encourage maximum independence but intervene and supervise when necessary- Involve family in performance of ADLs- Assess for home care needs following discharge - Consider OT consult to assist with ADL evaluation and planning for discharge- Provide patient education as appropriate  Outcome: Progressing  Goal: Maintains/Returns to pre admission functional level  Description: INTERVENTIONS:- Perform AM-PAC 6 Click Basic Mobility/ Daily Activity assessment daily.- Set and communicate daily mobility goal to care team and patient/family/caregiver. - Collaborate with rehabilitation services on mobility goals if consulted  Outcome:  Progressing     Problem: DISCHARGE PLANNING  Goal: Discharge to home or other facility with appropriate resources  Description: INTERVENTIONS:- Identify barriers to discharge w/patient and caregiver- Arrange for needed discharge resources and transportation as appropriate- Identify discharge learning needs (meds, wound care, etc.)- Arrange for interpretive services to assist at discharge as needed- Refer to Case Management Department for coordinating discharge planning if the patient needs post-hospital services based on physician/advanced practitioner order or complex needs related to functional status, cognitive ability, or social support system  Outcome: Progressing

## 2025-05-08 NOTE — ASSESSMENT & PLAN NOTE
Met criteria due to SSRBP on admission  CBC/CMP wnl, P:C 0.4  : Received acute treatment with Labetalol 20  S/p Magnesium sulfate infusion   Procardia  XL 30 mg + 30 mg   Procardia XL 60 mg  Continue to monitor blood pressures  Continue to monitor signs and symptoms of preeclampsia    Systolic (12hrs), Av , Min:125 , Max:153   Diastolic (12hrs), Av, Min:84, Max:100

## 2025-05-09 VITALS
HEIGHT: 63 IN | TEMPERATURE: 98 F | HEART RATE: 70 BPM | DIASTOLIC BLOOD PRESSURE: 84 MMHG | RESPIRATION RATE: 18 BRPM | OXYGEN SATURATION: 98 % | BODY MASS INDEX: 32.28 KG/M2 | WEIGHT: 182.2 LBS | SYSTOLIC BLOOD PRESSURE: 122 MMHG

## 2025-05-09 PROCEDURE — 99024 POSTOP FOLLOW-UP VISIT: CPT | Performed by: OBSTETRICS & GYNECOLOGY

## 2025-05-09 PROCEDURE — NC001 PR NO CHARGE: Performed by: OBSTETRICS & GYNECOLOGY

## 2025-05-09 RX ORDER — BLOOD PRESSURE TEST KIT-MEDIUM
KIT MISCELLANEOUS 2 TIMES DAILY
Qty: 1 EACH | Refills: 0 | Status: SHIPPED | OUTPATIENT
Start: 2025-05-09

## 2025-05-09 RX ORDER — NIFEDIPINE 90 MG/1
90 TABLET, EXTENDED RELEASE ORAL DAILY
Qty: 30 TABLET | Refills: 2 | Status: SHIPPED | OUTPATIENT
Start: 2025-05-10 | End: 2025-06-09

## 2025-05-09 RX ORDER — IBUPROFEN 600 MG/1
600 TABLET, FILM COATED ORAL EVERY 6 HOURS PRN
Qty: 56 TABLET | Refills: 0 | Status: SHIPPED | OUTPATIENT
Start: 2025-05-09 | End: 2025-05-23

## 2025-05-09 RX ORDER — NIFEDIPINE 30 MG/1
30 TABLET, EXTENDED RELEASE ORAL ONCE
Status: COMPLETED | OUTPATIENT
Start: 2025-05-09 | End: 2025-05-09

## 2025-05-09 RX ORDER — BENZOCAINE/MENTHOL 6 MG-10 MG
1 LOZENGE MUCOUS MEMBRANE DAILY PRN
Start: 2025-05-09

## 2025-05-09 RX ORDER — ACETAMINOPHEN 325 MG/1
650 TABLET ORAL EVERY 6 HOURS PRN
Qty: 112 TABLET | Refills: 0 | Status: SHIPPED | OUTPATIENT
Start: 2025-05-09 | End: 2025-05-23

## 2025-05-09 RX ADMIN — NIFEDIPINE 60 MG: 60 TABLET, EXTENDED RELEASE ORAL at 08:57

## 2025-05-09 RX ADMIN — NIFEDIPINE 30 MG: 30 TABLET, FILM COATED, EXTENDED RELEASE ORAL at 12:41

## 2025-05-09 NOTE — PLAN OF CARE
Problem: POSTPARTUM  Goal: Experiences normal postpartum course  Description: INTERVENTIONS:- Monitor maternal vital signs- Assess uterine involution and lochia  Outcome: Progressing  Goal: Appropriate maternal -  bonding  Description: INTERVENTIONS:- Identify family support- Assess for appropriate maternal/infant bonding -Encourage maternal/infant bonding opportunities- Referral to  or  as needed  Outcome: Progressing  Goal: Establishment of infant feeding pattern  Description: INTERVENTIONS:- Assess breast/bottle feeding- Refer to lactation as needed  Outcome: Progressing  Goal: Incision(s), wounds(s) or drain site(s) healing without S/S of infection  Description: INTERVENTIONS- Assess and document dressing, incision, wound bed, drain sites and surrounding tissue- Provide patient and family education- Perform skin care/dressing changes every     Outcome: Progressing

## 2025-05-09 NOTE — LACTATION NOTE
This note was copied from a baby's chart.  CONSULT - LACTATION  Baby Boy Trinh ([place]) 2 days male MRN: [id number]    [facility] AN NURSERY Room / Bed: (N)/(N) Encounter: [id number]    Maternal Information     MOTHER:  Kalyani Trinh  Maternal Age: 34 y.o.  OB History: # 1 - Date: 09/16/07, Sex: Female, Weight: None, GA: None, Type: Vaginal, Spontaneous, Apgar1: None, Apgar5: None, Living: Living, Birth Comments: None    # 2 - Date: 01/14/10, Sex: Female, Weight: None, GA: None, Type: Vaginal, Spontaneous, Apgar1: None, Apgar5: None, Living: Living, Birth Comments: None    # 3 - Date: 02/12/16, Sex: Female, Weight: 2353 g (5 lb 3 oz), GA: 38w5d, Type: Vaginal, Spontaneous, Apgar1: 9, Apgar5: 9, Living: Living, Birth Comments: None    # 4 - Date: 10/23/23, Sex: Female, Weight: 2665 g (5 lb 14 oz), GA: 38w3d, Type: Vaginal, Spontaneous, Apgar1: 9, Apgar5: 9, Living: Living, Birth Comments: None    # 5 - Date: 05/07/25, Sex: Male, Weight: 2565 g (5 lb 10.5 oz), GA: 39w0d, Type: Vaginal, Spontaneous, Apgar1: 9, Apgar5: 9, Living: Living, Birth Comments: None   Previous breast reduction surgery? No    Lactation history:   Has patient previously breast fed: Yes   How long had patient previously breast fed: 6 mo   Previous breast feeding complications: Lack of support   No past surgical history on file.    Birth information:  YOB: 2026   Time of birth: 2:04 AM   Sex: male   Delivery type: Vaginal, Spontaneous   Birth Weight: 2565 g (5 lb 10.5 oz)   Percent of Weight Change: -4%     Gestational Age: 39w0d   [unfilled]    Reason for Consult:    Reason for Consult: Discharge (ext) - 20 min    Risk Factors:    Risk Factors:  (SGA)    Breast and nipple assessment:   Breasts/Nipples  Left Breast: Soft  Right Breast: Soft  Left Nipple: Everted  Right Nipple: Everted  Intervention: Hand expression  Breastfeeding Progress: Not yet established, Breastfeeding  well    OB Lactation Tools:    Other OB Lactation Tools  Feeding Devices: Lanolin    Breast Pump:    Breast Pump  Pump: Personal (lansinoh hands free)  Pump Review/Education: Setup, frequency, and cleaning, Milk storage, Other (Comment)      [place] Assessment: No clinical assessment    Feeding recommendations:   [place] requests information on excl. Pumping.   Ed. On how to sterilize pump parts    Ed. On how to est. Milk supply. Enc. Mom to wait 2 weeks until Abebe has first growth spurt.     Ed. On how to excl. Pump and pump during a growth spurt    Ed. On paced bottle feeding.     Enc. To call lactation outpatient    Milk Supply:   - Allow for non-nutritive suck at the breast to stimulate supply   - Allow for skin to skin during and after each breastfeeding session   - Use massage, heat, and hand expression prior to feedings to assist with deep latch   - Increase pumping sessions and pump after every feeding    Education provided on growth spurts and cluster feedings. There are 4 growth spurts before 12 weeks. Cluster feedings may occur a few days before the growth spurt begins. Small, frequent feeds at the breast are encouraged    Discussed 2nd night syndrome and ways to calm infant. Hand out given. Information on hand expression given. Discussed benefits of knowing how to manually express breast including stimulating milk supply, softening nipple for latch and evacuating breast in the event of engorgement.      Discharge feeding plan for Excl. Pumping     1.Set alarms to pump every 2 hrs during the day and every 3 hrs at night  2. May use nipple cream/butter/oil where tunnel and funnel meet on flange to assist movement of breast tissue inside the flange *may check with 's instruction manual or with an IBCLC to size flange appropriately  3. Use breast compressions, hands on pumping techniques to assist in expressing milk  4. Meet early feeding cues  5. Feed expressed milk via syringe,feed  expressed milk or non-human milk via paced bottle feeding method. Review Milkmob on youtube or scan QR code for Milkmob video      Milk Mob     6. Continue to hand express between feeds to stimulate the breasts frequently  7. Pump both breasts while baby gets nutrition  8. Use all 5 senses when pumping to increase milk transfer.    Cycle pumping - Begin the pump in stimulation mode. Once milk is visible in the tunnel of the flange, change pump setting to expression mode. Once milk is NOT seen in the tunnel, cycle back to stimulation mode.Continue cycle pumping until end of feeding.    9. Bring baby back to mom for  skin to skin  10. Pump a few minutes after each feed to stimulate breasts and have expressed milk for next feed   11. Store expressed milk following CDC guidelines    Ed. On 3rd party distributors that offer different flanges on-line. ie) [employer]. Names of reputable companies like [employer] and [employer] offer flanges for every double electric pump. Lactation Hub will also provide a set of 12 mm to 19 mm flanges to fit most flanges.Enc. To try smaller flange and place a small amount of lanolin where the tunnel and funnel meet.     Ed. On various flange sizes. Ed. On various flange circumferences that may fit the mother's breast better. Ed. On possible use of Pumping Pals or LacTeck flanges. Encouraged parents to call lactation once edema has dissipated to reassess the flange sizing.     - Add in power pumping to assist in increasing milk supply. Take 2 consecutive days, 6 hours each day. Pump every hour for approx. 5-10 min within the 6 hours. Continue your regular schedule pumping and feeding during this power pumping session.    Here is a sample schedule for power pumpin:00 pm- Pump your regular 20 minutes pumping session     12:40 pm- Pump for 10 min.     1:00 pm - Pump for 10 min. Sometime within this hr.     2:00 pm - Pump your regular 20 minutes pumping session     2:40 pm - Pump for 10 min.      3:00 pm - Pump for 10 min. Sometime within this hr.     4:00 pm - Pump your regular 20 minutes pumping session     4:40 pm - Pump for 10 min.     5:00 pm - pump for 10. Sometime within this hr.     6:00 pm - Pump your regular 20 minutes pumping session     6:40 pm - Pump for 10 min.       Hands on Pumping        Christy Hume, MA 5/9/2025 3:02 PM

## 2025-05-09 NOTE — PLAN OF CARE
Problem: PAIN - ADULT  Goal: Verbalizes/displays adequate comfort level or baseline comfort level  Description: Interventions:- Encourage patient to monitor pain and request assistance- Assess pain using appropriate pain scale- Administer analgesics based on type and severity of pain and evaluate response- Implement non-pharmacological measures as appropriate and evaluate response- Consider cultural and social influences on pain and pain management- Notify physician/advanced practitioner if interventions unsuccessful or patient reports new pain  Outcome: Progressing     Problem: INFECTION - ADULT  Goal: Absence or prevention of progression during hospitalization  Description: INTERVENTIONS:- Assess and monitor for signs and symptoms of infection- Monitor lab/diagnostic results- Monitor all insertion sites, i.e. indwelling lines, tubes, and drains- Monitor endotracheal if appropriate and nasal secretions for changes in amount and color- [place] appropriate cooling/warming therapies per order- Administer medications as ordered- Instruct and encourage patient and family to use good hand hygiene technique- Identify and instruct in appropriate isolation precautions for identified infection/condition  Outcome: Progressing  Goal: Absence of fever/infection during neutropenic period  Description: INTERVENTIONS:- Monitor WBC  Outcome: Progressing     Problem: SAFETY ADULT  Goal: Patient will remain free of falls  Description: INTERVENTIONS:- Educate patient/family on patient safety including physical limitations- Instruct patient to call for assistance with activity - Consult OT/PT to assist with strengthening/mobility - Keep Call bell within reach- Keep bed low and locked with side rails adjusted as appropriate- Keep care items and personal belongings within reach- Initiate and maintain comfort rounds- Make Fall Risk Sign visible to staff- Offer Toileting every  in advance of need- Initiate/Maintain - Obtain necessary  fall risk management equipment Apply yellow socks and bracelet for high fall risk patients- Consider moving patient to room near nurses station  Outcome: Progressing  Goal: Maintain or return to baseline ADL function  Description: INTERVENTIONS:-  Assess patient's ability to carry out ADLs; assess patient's baseline for ADL function and identify physical deficits which impact ability to perform ADLs (bathing, care of mouth/teeth, toileting, grooming, dressing, etc.)- Assess/evaluate cause of self-care deficits - Assess range of motion- Assess patient's mobility; develop plan if impaired- Assess patient's need for assistive devices and provide as appropriate- Encourage maximum independence but intervene and supervise when necessary- Involve family in performance of ADLs- Assess for home care needs following discharge - Consider OT consult to assist with ADL evaluation and planning for discharge- Provide patient education as appropriate  Outcome: Progressing  Goal: Maintains/Returns to pre admission functional level  Description: INTERVENTIONS:- Perform AM-PAC 6 Click Basic Mobility/ Daily Activity assessment daily.- Set and communicate daily mobility goal to care team and patient/family/caregiver. - Collaborate with rehabilitation services on mobility goals if consulted- Perform Range of Motion - Reposition patient- Dangle patient - Stand patient - Ambulate patient - Out of bed to chair - Out of bed for meals - Out of bed for toileting- Record patient progress and toleration of activity level   Outcome: Progressing     Problem: DISCHARGE PLANNING  Goal: Discharge to home or other facility with appropriate resources  Description: INTERVENTIONS:- Identify barriers to discharge w/patient and caregiver- Arrange for needed discharge resources and transportation as appropriate- Identify discharge learning needs (meds, wound care, etc.)- Arrange for interpretive services to assist at discharge as needed- Refer to Case  Management Department for coordinating discharge planning if the patient needs post-hospital services based on physician/advanced practitioner order or complex needs related to functional status, cognitive ability, or social support system  Outcome: Progressing     Problem: POSTPARTUM  Goal: Experiences normal postpartum course  Description: INTERVENTIONS:- Monitor maternal vital signs- Assess uterine involution and lochia  Outcome: Progressing  Goal: Appropriate maternal -  bonding  Description: INTERVENTIONS:- Identify family support- Assess for appropriate maternal/infant bonding -Encourage maternal/infant bonding opportunities- Referral to  or  as needed  Outcome: Progressing  Goal: Establishment of infant feeding pattern  Description: INTERVENTIONS:- Assess breast/bottle feeding- Refer to lactation as needed  Outcome: Progressing  Goal: Incision(s), wounds(s) or drain site(s) healing without S/S of infection  Description: INTERVENTIONS- Assess and document dressing, incision, wound bed, drain sites and surrounding tissue- Provide patient and family education- Perform skin care/dressing changes every   Outcome: Progressing

## 2025-05-09 NOTE — PROGRESS NOTES
Assessed patient at bedside to discuss discharge after she requested to go home today.  She denies headache, vision changes, chest pain, SOB, RUQ/epigastric pain, or increased swelling.  We reviewed that she required an increase of her oral BP medication regimen today from Procardia XL 60 mg qd to 90 mg qd.  We reviewed that ideally, we would monitor her until tomorrow to confirm this regimen adequately controls her BP.  She shared that she cannot stay another night because her FOB must return to work tomorrow, and she therefore must be home to watch the kids.  She was counseled that if she goes home, we'd recommend:  Outpatient follow up in 1 week  Discharge on Procardia XL 90 mg PO qd  Strict return precautions (standard preeclampsia return precautions reviewed)  Home BP monitoring BID    She expressed understanding and was amenable with the plan as stated.    Reviewed w/ Dr. Sotero Rapp MD  OBGYN Resident  05/09/25  10:50 PM

## 2025-05-09 NOTE — PROGRESS NOTES
"Progress Note - OB/GYN   Name: Kalyani Trinh 34 y.o. female I MRN: [id number]  Unit/Bed#: -01 I Date of Admission: 2025   Date of Service: 2025 I Hospital Day: 3     Assessment & Plan   (spontaneous vaginal delivery)  Patient progressing toward postpartum milestones.  - Continue routine postpartum care  - Pain management with oral analgesics  - Encourage breastfeeding, familial bonding  - Contraception: Depo Provera  Request for sterilization  Desires sterilization  MA 31 signed 3/26/25  Pregnancy with normal glucose tolerance test (GTT)  2 hr GTT postpartum  Preeclampsia with severe features  Met criteria due to SSRBP on admission  CBC/CMP wnl, P:C 0.4  : Received acute treatment with Labetalol 20  S/p Magnesium sulfate infusion   Procardia  XL 30 mg + 30 mg   Procardia XL 60 mg  Continue to monitor blood pressures  Continue to monitor signs and symptoms of preeclampsia    Systolic (12hrs), Av , Min:125 , Max:153   Diastolic (12hrs), Av, Min:84, Max:100      Anemia during pregnancy in third trimester  3/18 Hgb 10.1  CBC on admission hgb 10.3        Assessment:  Post partum Day #2 s/p , stable, baby in the room    Subjective/Objective   Chief Complaint:     Post delivery. Patient is doing well. Lochia WNL. Pain well controlled.     Subjective:     Pain: yes, cramping, improved with meds  Tolerating PO: yes  Voiding: yes  Flatus: yes  Ambulating: yes  Chest pain: no  Shortness of breath: no  Leg pain: no  Lochia: minimal    Objective:     Vitals: /87 (BP Location: Left arm) Comment: RN notified  Pulse 67   Temp 98 °F (36.7 °C) (Oral)   Resp 16   Ht 5' 3\" (1.6 m)   Wt 82.6 kg (182 lb 3.2 oz)   LMP  (LMP Unknown)   SpO2 97%   Breastfeeding Yes   BMI 32.28 kg/m²     I/O          0701   0700  0701   0700    P.O. 1520     I.V. (mL/kg) 500 (6.1)     Total Intake(mL/kg) 2020 (24.5)     Urine (mL/kg/hr) 900 (0.5)     Total Output 900     Net +1120  "                  Lab Results   Component Value Date    WBC 13.58 (H) 05/07/2025    HGB 10.3 (L) 05/07/2025    HCT 30.5 (L) 05/07/2025    MCV 87 05/07/2025     05/07/2025       Physical Exam:     Gen: AAOx3, NAD  CV: no acute distress  Lungs: no acute distress  Abd: Soft, non-tender, non-distended, no rebound or guarding  Uterine fundus firm and non-tender, 2 cm below the umbilicus.  Ext: Non tender    Celia Boyce MD  OB GYN PGY1  05/09/25  6:14 AM

## 2025-05-12 NOTE — UTILIZATION REVIEW
"Mother and baby discharged 2025    NOTIFICATION OF INPATIENT ADMISSION   MATERNITY/DELIVERY AUTHORIZATION REQUEST   SERVICING FACILITY:   [facility] - L&D, [place], NICU  [address] [place] [address]  Tax ID: [id number]  NPI: [id number]   ATTENDING PROVIDER:  Attending Name and NPI#: Andrea Moore Md [[id number]]  Address: [address] [place] [address]  Phone: [phone]   ADMISSION INFORMATION:  Place of Service: Inpatient [facility]  Place of Service Code: 21  Inpatient Admission Date/Time: 25  5:44 PM  Discharge Date/Time: 2025  4:00 PM  Admitting Diagnosis Code/Description:  Alteration in comfort associated with uterine contractions [N85.8]  Encounter for full-term uncomplicated delivery [O80]     Mother: Kalyani Trinh 1990 Estimated Date of Delivery: 25  Delivering clinician: Alejandra Sanchez   OB History          5    Para   5    Term   5            AB        Living   5         SAB        IAB        Ectopic        Multiple   0    Live Births   5               [place] Name & MRN:   Information for the patient's :  Luzmaria Velezuricdennis Ayala [[id number]]   [place] Delivery Information:  Sex: male  Delivered 2025 2:04 AM by Vaginal, Spontaneous; Gestational Age: 39w0d    [place] Measurements:  Weight: 5 lb 10.5 oz (2565 g);  Height: 17.5\"    APGAR 1 minute 5 minutes 10 minutes   Totals: 9 9       UTILIZATION REVIEW CONTACT:  Candace [place], Utilization   Network Utilization Review Department  Phone: [phone]  Fax [phone]  Email: [email or web address]  Contact for approvals/pending authorizations, clinical reviews, and discharge.     PHYSICIAN ADVISORY SERVICES:  Medical Necessity Denial & Brtm-ap-Tryn Review  Phone: [phone]  Fax: [phone]  Email: [email or web address]     DISCHARGE SUPPORT TEAM:  For Patients Discharge Needs & " Updates  Phone: [phone] opt. 2 Fax: [phone]  Email: [email or web address]

## 2025-05-13 LAB — PLACENTA IN STORAGE: NORMAL

## 2025-05-15 ENCOUNTER — TELEPHONE (OUTPATIENT)
Dept: OBGYN CLINIC | Facility: CLINIC | Age: 35
End: 2025-05-15

## 2025-05-15 NOTE — TELEPHONE ENCOUNTER
[facility] Women[facility] Health Surgical Case Review  Date Reviewed: 5/15/25  Reviewed by: Yaritza Bey MD  Case request: Bilateral salpinectomy  Indication: Sterilization  Surgical Consent: Needs signed   MA30/31: signed in march 2025    Case request approved: Yes, add on Leep for Hx of DOMITILA II and discuss with patient at preop visit    Comments:  Please discuss adding on LEEP with Pt at preop visit   Please schedule with Dr. Bey at [facility] in June or July    Nicole Vigil DO  PGY-II, OBGYN  05/15/25

## 2025-05-17 PROBLEM — O23.02 PYELONEPHRITIS AFFECTING PREGNANCY IN SECOND TRIMESTER: Status: RESOLVED | Noted: 2025-04-17 | Resolved: 2025-05-17

## 2025-05-19 ENCOUNTER — TELEPHONE (OUTPATIENT)
Dept: OBGYN CLINIC | Facility: MEDICAL CENTER | Age: 35
End: 2025-05-19

## 2025-05-19 NOTE — TELEPHONE ENCOUNTER
----- Message from Nicole Vigil DO sent at 5/15/2025  6:39 PM EDT -----  Regarding: Surgey dot phrase, schedule with Dr Bey at [facility] please  [facility] OB GYN DepartmentSurgery Scheduling SheetPatient Name: Kalyani Roman: 1990Provider: Nicole Vigil DO ##please schedule with Dr Bey at [facility] in either  or July## Needed: no; Language: N/AProcedure: exam under anesthesia, LEEP cervix, bilateral salpingectomyDiagnosis: Hx of DOMITILA II, sterilizationSpecial Needs or Equipment: noneAnesthesia: General anesthesiaLength of stay: outpatientDoes patient have comorbid conditions that will require close perioperative monitoring prior to safe discharge: noThe patient has comorbid conditions that will require close perioperative monitoring prior to safe discharge, including N/A. This may require acute care beyond the usual and routine recovery period. As such, inpatient admission post-operatively is expected and appropriate, and anticipated hospital length of stay will be >2 midnights.Pre-Admission Testing Needed: no Labs that should be ordered: NAOrder PAT that is recommended in prep for procedure?: Not IndicatedMedical Clearance Needed: no; Provider: MA Form Signed (tubals/hysterectomy): YesSurgical Drink Given: no How many days out of work: 2 week(s)   How many days no drivin day(s)   Is pre op appt needed?  yesInterval for post op appt: 2 week(s) For Surgical Scheduler: [facility]: Post op Appt:Consult/Medical clearance appt:

## 2025-05-22 NOTE — PROGRESS NOTES
"[facility]: Ms. Trinh was seen today for nuchal translucency ultrasound.  See ultrasound report under \"OB Procedures\" tab.        Physical Exam  Constitutional:       General: She is not in acute distress.     Appearance: Normal appearance.   HENT:      Head: Normocephalic and atraumatic.   Eyes:      Extraocular Movements: Extraocular movements intact.   Cardiovascular:      Rate and Rhythm: Normal rate.   Pulmonary:      Effort: Pulmonary effort is normal. No respiratory distress.   Skin:     Findings: No erythema or rash.   Neurological:      Mental Status: She is alert and oriented to person, place, and time.   Psychiatric:         Mood and Affect: Mood normal.         Behavior: Behavior normal.         Please don't hesitate to contact our office with any concerns or questions.  -Marilou Albright MD      " LMOM THAT IT IS OK TO GET THE MRI DONE NOW WHILE ON STEROIDS IF INSURANCE APPROVES IT.

## 2025-07-01 ENCOUNTER — TELEPHONE (OUTPATIENT)
Dept: OBGYN CLINIC | Facility: CLINIC | Age: 35
End: 2025-07-01

## 2025-07-01 NOTE — TELEPHONE ENCOUNTER
"Pt called office stating needing letter to be able to return to work, pt works from home and explained she needs the letter or she will loose her job informed pt she needs PP visit to be cleared for work pt informed me that she does not have a way to get here since she lives in [place], offered pt to be seen at [facility] in [place] since it would be closer, pt denied again stating \"my babies are to little and I do not want to bring them out this little, and the baby father has a unstable work schedule so he can not stay with them can I have a virtual visit for this PP so I can get cleared because I will loose my job\" I verbalized understanding to pt and informed that I can not confirm a virtual visit will be allowed since it's not the routine method for postpartum but will reach out to my practice manager to see what can be done, pt verbalized understanding and was agreeable to plan.    "

## 2025-07-03 ENCOUNTER — TELEPHONE (OUTPATIENT)
Dept: OBGYN CLINIC | Facility: CLINIC | Age: 35
End: 2025-07-03

## 2025-07-03 NOTE — TELEPHONE ENCOUNTER
Called pt and lvm to inform that unfortunately  due to her OB history provider wants her to come into office for postpartum visit to be cleared for work. left [place] office number in  to callback and sun.